# Patient Record
Sex: FEMALE | Race: WHITE | NOT HISPANIC OR LATINO | Employment: UNEMPLOYED | URBAN - NONMETROPOLITAN AREA
[De-identification: names, ages, dates, MRNs, and addresses within clinical notes are randomized per-mention and may not be internally consistent; named-entity substitution may affect disease eponyms.]

---

## 2018-02-12 ENCOUNTER — OFFICE VISIT (OUTPATIENT)
Dept: FAMILY MEDICINE CLINIC | Facility: CLINIC | Age: 33
End: 2018-02-12

## 2018-02-12 VITALS
WEIGHT: 183 LBS | OXYGEN SATURATION: 98 % | DIASTOLIC BLOOD PRESSURE: 78 MMHG | SYSTOLIC BLOOD PRESSURE: 110 MMHG | HEART RATE: 89 BPM | RESPIRATION RATE: 18 BRPM | HEIGHT: 64 IN | BODY MASS INDEX: 31.24 KG/M2

## 2018-02-12 DIAGNOSIS — F41.9 ANXIETY: Primary | ICD-10-CM

## 2018-02-12 DIAGNOSIS — F17.200 SMOKER: ICD-10-CM

## 2018-02-12 DIAGNOSIS — E66.09 CLASS 1 OBESITY DUE TO EXCESS CALORIES WITHOUT SERIOUS COMORBIDITY WITH BODY MASS INDEX (BMI) OF 31.0 TO 31.9 IN ADULT: ICD-10-CM

## 2018-02-12 DIAGNOSIS — R68.82 LOW LIBIDO: ICD-10-CM

## 2018-02-12 DIAGNOSIS — R21 RASH: ICD-10-CM

## 2018-02-12 DIAGNOSIS — K58.0 IRRITABLE BOWEL SYNDROME WITH DIARRHEA: ICD-10-CM

## 2018-02-12 DIAGNOSIS — Z76.89 ESTABLISHING CARE WITH NEW DOCTOR, ENCOUNTER FOR: ICD-10-CM

## 2018-02-12 PROCEDURE — 99204 OFFICE O/P NEW MOD 45 MIN: CPT | Performed by: FAMILY MEDICINE

## 2018-02-12 RX ORDER — NORGESTIMATE AND ETHINYL ESTRADIOL 0.25-0.035
KIT ORAL
Refills: 11 | COMMUNITY
Start: 2018-01-20 | End: 2018-02-12 | Stop reason: SDUPTHER

## 2018-02-12 RX ORDER — NORGESTIMATE AND ETHINYL ESTRADIOL 0.25-0.035
1 KIT ORAL DAILY
Qty: 28 TABLET | Refills: 11 | Status: SHIPPED | OUTPATIENT
Start: 2018-02-12 | End: 2020-10-09

## 2018-02-12 RX ORDER — FLUOXETINE HYDROCHLORIDE 20 MG/1
CAPSULE ORAL
Refills: 5 | COMMUNITY
Start: 2017-12-26 | End: 2018-02-12 | Stop reason: ALTCHOICE

## 2018-02-12 RX ORDER — PROMETHAZINE HYDROCHLORIDE 12.5 MG/1
12.5 TABLET ORAL EVERY 6 HOURS PRN
Qty: 30 TABLET | Refills: 0 | Status: SHIPPED | OUTPATIENT
Start: 2018-02-12 | End: 2020-10-09

## 2018-02-12 RX ORDER — ESCITALOPRAM OXALATE 10 MG/1
10 TABLET ORAL DAILY
Qty: 30 TABLET | Refills: 2 | Status: SHIPPED | OUTPATIENT
Start: 2018-02-12 | End: 2018-03-26 | Stop reason: SINTOL

## 2018-02-12 NOTE — PROGRESS NOTES
"Subjective cc: establish care for anxiety  Nicole Ibarra is a 32 y.o. female who presents to establish care.  Patient needs refills on prozac. Patient reports losing about 80 pounds in a year a few years back - unintentional - had extensive work up - all negative. Patient was then diagnosed with IBD. Patient feels better with zofran PRN, prozac because patient is \"high strung\" which helped to decrease stress levels. Patient reports low libido when on the prozac - that is why patient is not interested in increasing dose. Patient reports having skin spots come up all over, scaly plaque.  Never had skin issues before. She has tried triamcinolone without relief. 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     Patient reports anxiety - not significant improvement with prozac but did help with GI symptoms. GI symptoms flared up a few times per week. Patient has been off prozac for about 1 month now.      Patient is now without insurance so cost is an issue with deciding on care.     Anxiety   Presents for initial visit. Onset was more than 5 years ago. The problem has been gradually worsening. Symptoms include decreased concentration, depressed mood, excessive worry, irritability, muscle tension, nausea, nervous/anxious behavior, palpitations, panic and restlessness. Patient reports no chest pain, compulsions, confusion, dizziness, dry mouth, feeling of choking, hyperventilation, insomnia, shortness of breath or suicidal ideas. Symptoms occur most days. The severity of symptoms is interfering with daily activities. The symptoms are aggravated by family issues and medication withdrawal. The quality of sleep is good. Nighttime awakenings: occasional.     Risk factors include change in medication. Her past medical history is significant for anxiety/panic attacks. There is no history of arrhythmia, bipolar disorder, CAD, CHF, chronic lung disease, fibromyalgia, hyperthyroidism or suicide attempts. Past treatments include SSRIs. " "The treatment provided moderate relief. Compliance with prior treatments has been good. Prior compliance problems include medication issues.        The following portions of the patient's history were reviewed and updated as appropriate: allergies, current medications, past family history, past medical history, past social history, past surgical history and problem list.        Review of Systems   Constitutional: Positive for fatigue and irritability. Negative for activity change, appetite change, chills, fever and unexpected weight change.   Respiratory: Negative for shortness of breath and wheezing.    Cardiovascular: Positive for palpitations. Negative for chest pain and leg swelling.   Gastrointestinal: Positive for diarrhea and nausea. Negative for abdominal distention, abdominal pain, blood in stool, constipation and rectal pain.   Genitourinary: Negative for menstrual problem.        Low libido, vaginal dryness   Skin: Positive for color change and rash.   Neurological: Negative for dizziness.   Psychiatric/Behavioral: Positive for decreased concentration. Negative for confusion, hallucinations, self-injury, sleep disturbance and suicidal ideas. The patient is nervous/anxious. The patient does not have insomnia and is not hyperactive.    All other systems reviewed and are negative.      Objective   Blood pressure 110/78, pulse 89, resp. rate 18, height 162.6 cm (64\"), weight 83 kg (183 lb), SpO2 98 %.  Physical Exam   Constitutional: She is oriented to person, place, and time. She appears well-developed and well-nourished. No distress.   HENT:   Head: Normocephalic and atraumatic.   Right Ear: External ear normal.   Left Ear: External ear normal.   Nose: Nose normal.   Mouth/Throat: Oropharynx is clear and moist.   Eyes: Conjunctivae and EOM are normal. Pupils are equal, round, and reactive to light. Right eye exhibits no discharge. Left eye exhibits no discharge.   Neck: Normal range of motion. No tracheal " deviation present. No thyromegaly present.   Cardiovascular: Normal rate, regular rhythm, normal heart sounds and intact distal pulses.    No murmur heard.  Pulmonary/Chest: Effort normal and breath sounds normal. No stridor. No respiratory distress. She has no wheezes. She exhibits no tenderness.   Abdominal: Soft. Bowel sounds are normal. She exhibits no distension. There is no tenderness.   Musculoskeletal: Normal range of motion. She exhibits no edema.   Lymphadenopathy:     She has no cervical adenopathy.   Neurological: She is alert and oriented to person, place, and time. She exhibits normal muscle tone. Coordination normal.   Skin: Skin is warm and dry. Rash noted. She is not diaphoretic. There is erythema.        Psychiatric: She has a normal mood and affect. Her behavior is normal. Judgment and thought content normal.   Nursing note and vitals reviewed.      Assessment/Plan   Problems Addressed this Visit        Digestive    Class 1 obesity due to excess calories without serious comorbidity with body mass index (BMI) of 31.0 to 31.9 in adult    Irritable bowel syndrome with diarrhea       Other    Anxiety - Primary    Low libido    Relevant Medications    escitalopram (LEXAPRO) 10 MG tablet    Smoker      Other Visit Diagnoses     Establishing care with new doctor, encounter for        Rash              PLAN:     #1 Anxiety: patientSymptoms.  She has weaned herself off of Prozac.  She would like to try something different.  We will start patient on by mouth Lexapro.  Advised on risk benefits and side effects of this medication.  Discussed alternatives.  Mutually decided on Lexapro.  We will start patient a 10 mg dose.  Advised on importance of compliance with medication.  Advised on avoiding triggers that seem to make her anxiety worse.  Discussed cognitive behavioral therapy.  We will delay this at this time.  Patient to notify us if she has any side effects with this medication.  Recheck in 4-6  weeks.    #2 irritable bowel with diarrhea: Patient reports that this is well-controlled with her anxiety is well controlled.  Our plan will be to start Lexapro and see how her symptoms go.  Patient requests medication to help with occasional nausea.  Phenergan was sent in.  Patient had previously taken Zofran however this would be expensive.  Pending then is more economical.  Patient has had prior workup.  She declines referral to GI at this time stating her symptoms are not that bad.    #3 smoker: Patient currently smokes cigarettes.  We discussed trying to quit.  She is not interested at this time.  Discussed 1 800 quit now. I advised the patient of the risks in continuing to use tobacco, and I provided this patient with smoking cessation educational materials.    During this visit, I spent > 3-10 minutes counseling the patient regarding smoking cessation.    #4 obesity with a BMI of 31.4:Patient's BMI is above normal parameters. Follow-up plan includes:  exercise counseling and nutrition counseling.    #5 low libido: Patient complains of vaginal dryness.  Advised on using lubricant.  Advised that Lexapro may have the side effect of low libido.  Discussed age of menopause.    #6 rash: Patient given sample of Eucrisa.  Try this.  If works contact our office and try to get approved.    #7 breathing: Discussed pneumonia vaccine and influenza vaccine.  Patient declined at this time secondary to cost.  Patient will need Pap smear: Offered to do this is our office or refer her to GYN.  Patient will think about where she would like to go to have this time and let us know.  Previous labs reviewed from 2016.  We will defer lab work at this time secondary to cost.          This document has been electronically signed by Christa Edge MD on February 20, 2018 10:36 AM

## 2018-02-20 ENCOUNTER — TELEPHONE (OUTPATIENT)
Dept: FAMILY MEDICINE CLINIC | Facility: CLINIC | Age: 33
End: 2018-02-20

## 2018-02-20 PROBLEM — E66.09 CLASS 1 OBESITY DUE TO EXCESS CALORIES WITHOUT SERIOUS COMORBIDITY WITH BODY MASS INDEX (BMI) OF 31.0 TO 31.9 IN ADULT: Status: ACTIVE | Noted: 2018-02-20

## 2018-02-20 PROBLEM — K58.0 IRRITABLE BOWEL SYNDROME WITH DIARRHEA: Status: ACTIVE | Noted: 2018-02-20

## 2018-02-20 PROBLEM — F17.200 SMOKER: Status: ACTIVE | Noted: 2018-02-20

## 2018-02-20 PROBLEM — R68.82 LOW LIBIDO: Status: ACTIVE | Noted: 2018-02-20

## 2018-02-20 PROBLEM — E66.811 CLASS 1 OBESITY DUE TO EXCESS CALORIES WITHOUT SERIOUS COMORBIDITY WITH BODY MASS INDEX (BMI) OF 31.0 TO 31.9 IN ADULT: Status: ACTIVE | Noted: 2018-02-20

## 2018-02-20 PROBLEM — F41.9 ANXIETY: Status: ACTIVE | Noted: 2018-02-20

## 2018-02-20 NOTE — TELEPHONE ENCOUNTER
Patient called stating that the Eucrisa is working. She would like a Rx, but it needs approval for insurance.

## 2018-02-22 NOTE — TELEPHONE ENCOUNTER
Please advise patient that I have sent an rx for eucrisa to [facility] pharmacy - IT WILL BE EXPENSIVE - or denied so she does not need to pick it up. We must have the denial for us to PA it to try to make it affordable.

## 2018-02-27 ENCOUNTER — TELEPHONE (OUTPATIENT)
Dept: FAMILY MEDICINE CLINIC | Facility: CLINIC | Age: 33
End: 2018-02-27

## 2018-02-27 NOTE — TELEPHONE ENCOUNTER
Are you familiar with what we need to do for this assistance program? I talked to the rep about options today for this patient but did not realize she was without insurance and what the rep advised me on was for insurance plans only I believe

## 2018-02-27 NOTE — TELEPHONE ENCOUNTER
Patient was contacted about the different programs available to assist with the cost of eucrisa. 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 She is interested in the assistant program that the [facility] [place].

## 2018-02-27 NOTE — TELEPHONE ENCOUNTER
I had spoke with her as well the option would be for the patient to pay $70 for a large tube or she could fill the patient assistance application to see if she qualifies for assistance

## 2018-02-28 ENCOUNTER — TELEPHONE (OUTPATIENT)
Dept: FAMILY MEDICINE CLINIC | Facility: CLINIC | Age: 33
End: 2018-02-28

## 2018-02-28 DIAGNOSIS — R21 RASH: Primary | ICD-10-CM

## 2018-02-28 NOTE — TELEPHONE ENCOUNTER
I have contacted the patient to update her that she will be getting a call from [facility] pharmacy to process her application

## 2018-03-26 ENCOUNTER — OFFICE VISIT (OUTPATIENT)
Dept: FAMILY MEDICINE CLINIC | Facility: CLINIC | Age: 33
End: 2018-03-26

## 2018-03-26 VITALS
HEART RATE: 82 BPM | HEIGHT: 64 IN | DIASTOLIC BLOOD PRESSURE: 83 MMHG | RESPIRATION RATE: 16 BRPM | SYSTOLIC BLOOD PRESSURE: 121 MMHG | OXYGEN SATURATION: 99 % | TEMPERATURE: 98.6 F | BODY MASS INDEX: 30.59 KG/M2 | WEIGHT: 179.2 LBS

## 2018-03-26 DIAGNOSIS — E66.09 CLASS 1 OBESITY DUE TO EXCESS CALORIES WITH SERIOUS COMORBIDITY AND BODY MASS INDEX (BMI) OF 30.0 TO 30.9 IN ADULT: ICD-10-CM

## 2018-03-26 DIAGNOSIS — F41.9 ANXIETY: Primary | ICD-10-CM

## 2018-03-26 DIAGNOSIS — F17.200 SMOKER: ICD-10-CM

## 2018-03-26 DIAGNOSIS — R68.82 LOW LIBIDO: ICD-10-CM

## 2018-03-26 DIAGNOSIS — K58.0 IRRITABLE BOWEL SYNDROME WITH DIARRHEA: ICD-10-CM

## 2018-03-26 PROCEDURE — 99214 OFFICE O/P EST MOD 30 MIN: CPT | Performed by: FAMILY MEDICINE

## 2018-03-26 RX ORDER — AMITRIPTYLINE HYDROCHLORIDE 25 MG/1
25 TABLET, FILM COATED ORAL NIGHTLY
Qty: 30 TABLET | Refills: 2 | Status: SHIPPED | OUTPATIENT
Start: 2018-03-26 | End: 2018-06-05 | Stop reason: SDUPTHER

## 2018-04-06 PROBLEM — E66.09 CLASS 1 OBESITY DUE TO EXCESS CALORIES WITHOUT SERIOUS COMORBIDITY WITH BODY MASS INDEX (BMI) OF 31.0 TO 31.9 IN ADULT: Status: RESOLVED | Noted: 2018-02-20 | Resolved: 2018-04-06

## 2018-04-06 PROBLEM — E66.811 CLASS 1 OBESITY DUE TO EXCESS CALORIES WITHOUT SERIOUS COMORBIDITY WITH BODY MASS INDEX (BMI) OF 31.0 TO 31.9 IN ADULT: Status: RESOLVED | Noted: 2018-02-20 | Resolved: 2018-04-06

## 2018-04-06 PROBLEM — E66.811 CLASS 1 OBESITY DUE TO EXCESS CALORIES WITH SERIOUS COMORBIDITY AND BODY MASS INDEX (BMI) OF 30.0 TO 30.9 IN ADULT: Status: ACTIVE | Noted: 2018-04-06

## 2018-04-06 PROBLEM — E66.09 CLASS 1 OBESITY DUE TO EXCESS CALORIES WITH SERIOUS COMORBIDITY AND BODY MASS INDEX (BMI) OF 30.0 TO 30.9 IN ADULT: Status: ACTIVE | Noted: 2018-04-06

## 2018-05-10 ENCOUNTER — OFFICE VISIT (OUTPATIENT)
Dept: FAMILY MEDICINE CLINIC | Facility: CLINIC | Age: 33
End: 2018-05-10

## 2018-05-10 VITALS
DIASTOLIC BLOOD PRESSURE: 76 MMHG | HEART RATE: 89 BPM | SYSTOLIC BLOOD PRESSURE: 106 MMHG | RESPIRATION RATE: 18 BRPM | TEMPERATURE: 98.4 F | BODY MASS INDEX: 31.82 KG/M2 | OXYGEN SATURATION: 100 % | WEIGHT: 186.4 LBS | HEIGHT: 64 IN

## 2018-05-10 DIAGNOSIS — E66.09 CLASS 1 OBESITY DUE TO EXCESS CALORIES WITH SERIOUS COMORBIDITY AND BODY MASS INDEX (BMI) OF 30.0 TO 30.9 IN ADULT: ICD-10-CM

## 2018-05-10 DIAGNOSIS — K58.0 IRRITABLE BOWEL SYNDROME WITH DIARRHEA: ICD-10-CM

## 2018-05-10 DIAGNOSIS — F41.9 ANXIETY: ICD-10-CM

## 2018-05-10 DIAGNOSIS — F33.1 MODERATE EPISODE OF RECURRENT MAJOR DEPRESSIVE DISORDER (HCC): Primary | ICD-10-CM

## 2018-05-10 PROCEDURE — 99214 OFFICE O/P EST MOD 30 MIN: CPT | Performed by: FAMILY MEDICINE

## 2018-05-10 NOTE — PROGRESS NOTES
Subjective  CC: anxiety  Nicole Ibarra is a 33 y.o. female who presents for follow up on her anxiety. Stopped lexapro and has only been on elavil. She now feels depressed and doesn't care.    Patient reports the medication made a huge difference in her abdominal symptoms but she reports that she doesn't feel like herself because of the lack of motivation.   No snoring, does wake up at night about 3-5 times/night, no dry mouth in AM, does feel somewhat rested. Tired during day - worse in last month.     Depression   Visit Type: follow-up  Patient presents with the following symptoms: anhedonia, decreased concentration, depressed mood, fatigue, irritability and nervousness/anxiety.  Patient is not experiencing: confusion, palpitations, shortness of breath, suicidal ideas, suicidal planning and thoughts of death.  Frequency of symptoms: most days   Severity: interfering with daily activities   Sleep quality: fair  Nighttime awakenings: one to two  Compliance with medications:  %        Anxiety   Presents for follow-up visit. Symptoms include decreased concentration, depressed mood, irritability and nervous/anxious behavior. Patient reports no chest pain, confusion, palpitations, shortness of breath or suicidal ideas. Symptoms occur most days. The severity of symptoms is interfering with daily activities. The quality of sleep is fair. Nighttime awakenings: one to two.     Her past medical history is significant for depression. Compliance with medications is %.        The following portions of the patient's history were reviewed and updated as appropriate: allergies, current medications, past family history, past medical history, past social history, past surgical history and problem list.        Review of Systems   Constitutional: Positive for activity change, fatigue and irritability. Negative for appetite change and fever.   Respiratory: Negative for cough, chest tightness, shortness of breath and  "wheezing.    Cardiovascular: Negative for chest pain and palpitations.   Endocrine: Negative for cold intolerance and heat intolerance.   Musculoskeletal: Positive for arthralgias.   Psychiatric/Behavioral: Positive for decreased concentration and dysphoric mood. Negative for confusion, hallucinations, self-injury and suicidal ideas. The patient is nervous/anxious. The patient is not hyperactive.        Objective   Blood pressure 106/76, pulse 89, temperature 98.4 °F (36.9 °C), temperature source Oral, resp. rate 18, height 162.6 cm (64\"), weight 84.6 kg (186 lb 6.4 oz), SpO2 100 %, not currently breastfeeding.  Physical Exam   Constitutional: She is oriented to person, place, and time. She appears well-developed and well-nourished. No distress.   HENT:   Head: Normocephalic and atraumatic.   Right Ear: External ear normal.   Left Ear: External ear normal.   Nose: Nose normal.   Mouth/Throat: Oropharynx is clear and moist. No oropharyngeal exudate.   Eyes: Conjunctivae and EOM are normal. Right eye exhibits no discharge. Left eye exhibits no discharge.   Neck: Normal range of motion. No tracheal deviation present. No thyromegaly present.   Cardiovascular: Normal rate, regular rhythm, normal heart sounds and intact distal pulses.    No murmur heard.  Pulmonary/Chest: Effort normal and breath sounds normal. No stridor. No respiratory distress. She has no wheezes. She exhibits no tenderness.   Abdominal: Soft. Bowel sounds are normal. She exhibits no distension. There is no tenderness.   Lymphadenopathy:     She has no cervical adenopathy.   Neurological: She is alert and oriented to person, place, and time. She exhibits normal muscle tone. Coordination normal.   Skin: Skin is warm and dry. She is not diaphoretic.   Psychiatric: She has a normal mood and affect. Her behavior is normal. Judgment and thought content normal.   Nursing note and vitals reviewed.      Assessment/Plan   Problems Addressed this Visit        " Digestive    Irritable bowel syndrome with diarrhea    Class 1 obesity due to excess calories with serious comorbidity and body mass index (BMI) of 30.0 to 30.9 in adult       Other    Anxiety      Other Visit Diagnoses     Moderate episode of recurrent major depressive disorder    -  Primary    Relevant Orders    TSH        PLAN:     #1 Depression/Anxiety: I feel that patient could be having worsening depression after starting the Lexapro - discussed that it may have been doing more for her than she realized.  Recommending increasing to 2 tablets PO QHS of elavil and see if her mood/energy levels start returning to normal. Will check TSH.  If she continues to feel fatigued and no energy - will consider changing medication.     #2 Irritable bowel: improved with elavil - continue on current medication     #3 obesity: Patient's Body mass index is 32 kg/m². BMI is above normal parameters. Recommendations include: exercise counseling and nutrition counseling.    Patient is self pay and has to choose cost effective treatment options.           This document has been electronically signed by Christa Edge MD on May 22, 2018 9:00 AM

## 2018-05-11 LAB — TSH SERPL DL<=0.005 MIU/L-ACNC: 1.62 MIU/ML (ref 0.47–4.68)

## 2018-06-05 DIAGNOSIS — K58.0 IRRITABLE BOWEL SYNDROME WITH DIARRHEA: ICD-10-CM

## 2018-06-05 RX ORDER — AMITRIPTYLINE HYDROCHLORIDE 25 MG/1
50 TABLET, FILM COATED ORAL NIGHTLY
Qty: 180 TABLET | Refills: 0 | Status: SHIPPED | OUTPATIENT
Start: 2018-06-05 | End: 2018-09-12 | Stop reason: SDUPTHER

## 2018-06-05 NOTE — TELEPHONE ENCOUNTER
Patient states that she is seeing improvement with taking the 25mg two tablets patient will be out of the rx if you can authorize the new rx.

## 2018-06-13 RX ORDER — NORGESTIMATE AND ETHINYL ESTRADIOL 0.25-0.035
KIT ORAL
Qty: 28 TABLET | Refills: 0 | OUTPATIENT
Start: 2018-06-13

## 2018-09-12 DIAGNOSIS — K58.0 IRRITABLE BOWEL SYNDROME WITH DIARRHEA: ICD-10-CM

## 2018-09-13 RX ORDER — AMITRIPTYLINE HYDROCHLORIDE 25 MG/1
TABLET, FILM COATED ORAL
Qty: 180 TABLET | Refills: 2 | Status: SHIPPED | OUTPATIENT
Start: 2018-09-13 | End: 2019-06-25 | Stop reason: SDUPTHER

## 2019-06-25 DIAGNOSIS — K58.0 IRRITABLE BOWEL SYNDROME WITH DIARRHEA: ICD-10-CM

## 2019-06-25 RX ORDER — AMITRIPTYLINE HYDROCHLORIDE 25 MG/1
25 TABLET, FILM COATED ORAL NIGHTLY
Qty: 60 TABLET | Refills: 0 | Status: SHIPPED | OUTPATIENT
Start: 2019-06-25 | End: 2020-10-09

## 2019-06-25 NOTE — TELEPHONE ENCOUNTER
Pt called to report that she needs a refill. Pt hasnt been seen since May of 2018. Pt is asking for a 2 week supply until she can come into office to be seen. Pt reports she is going through a rough time with her father being sick and no job.

## 2020-08-25 ENCOUNTER — HOSPITAL ENCOUNTER (EMERGENCY)
Facility: HOSPITAL | Age: 35
Discharge: LEFT BEFORE DISCHARGE | End: 2020-08-26
Attending: EMERGENCY MEDICINE | Admitting: EMERGENCY MEDICINE

## 2020-08-25 ENCOUNTER — APPOINTMENT (OUTPATIENT)
Dept: CT IMAGING | Facility: HOSPITAL | Age: 35
End: 2020-08-25

## 2020-08-25 DIAGNOSIS — N83.209 CYST OF OVARY, UNSPECIFIED LATERALITY: ICD-10-CM

## 2020-08-25 DIAGNOSIS — M54.30 SCIATICA, UNSPECIFIED LATERALITY: Primary | ICD-10-CM

## 2020-08-25 DIAGNOSIS — R00.0 TACHYCARDIA: ICD-10-CM

## 2020-08-25 LAB
ALBUMIN SERPL-MCNC: 4.9 G/DL (ref 3.5–5.2)
ALBUMIN/GLOB SERPL: 1.7 G/DL
ALP SERPL-CCNC: 69 U/L (ref 39–117)
ALT SERPL W P-5'-P-CCNC: 19 U/L (ref 1–33)
ANION GAP SERPL CALCULATED.3IONS-SCNC: 15 MMOL/L (ref 5–15)
AST SERPL-CCNC: 19 U/L (ref 1–32)
BASOPHILS # BLD AUTO: 0.02 10*3/MM3 (ref 0–0.2)
BASOPHILS NFR BLD AUTO: 0.2 % (ref 0–1.5)
BILIRUB SERPL-MCNC: 0.9 MG/DL (ref 0–1.2)
BILIRUB UR QL STRIP: NEGATIVE
BUN SERPL-MCNC: 9 MG/DL (ref 6–20)
BUN/CREAT SERPL: 15.8 (ref 7–25)
CALCIUM SPEC-SCNC: 9.7 MG/DL (ref 8.6–10.5)
CHLORIDE SERPL-SCNC: 104 MMOL/L (ref 98–107)
CLARITY UR: CLEAR
CO2 SERPL-SCNC: 22 MMOL/L (ref 22–29)
COLOR UR: YELLOW
CREAT SERPL-MCNC: 0.57 MG/DL (ref 0.57–1)
CRP SERPL-MCNC: 0.64 MG/DL (ref 0–0.5)
D-LACTATE SERPL-SCNC: 1.6 MMOL/L (ref 0.5–2)
DEPRECATED RDW RBC AUTO: 40 FL (ref 37–54)
EOSINOPHIL # BLD AUTO: 0 10*3/MM3 (ref 0–0.4)
EOSINOPHIL NFR BLD AUTO: 0 % (ref 0.3–6.2)
ERYTHROCYTE [DISTWIDTH] IN BLOOD BY AUTOMATED COUNT: 13 % (ref 12.3–15.4)
ERYTHROCYTE [SEDIMENTATION RATE] IN BLOOD: 7 MM/HR (ref 0–20)
GFR SERPL CREATININE-BSD FRML MDRD: 119 ML/MIN/1.73
GLOBULIN UR ELPH-MCNC: 2.9 GM/DL
GLUCOSE SERPL-MCNC: 129 MG/DL (ref 65–99)
GLUCOSE UR STRIP-MCNC: NEGATIVE MG/DL
HCG SERPL QL: NEGATIVE
HCT VFR BLD AUTO: 42.7 % (ref 34–46.6)
HGB BLD-MCNC: 14.6 G/DL (ref 12–15.9)
HGB UR QL STRIP.AUTO: NEGATIVE
IMM GRANULOCYTES # BLD AUTO: 0.03 10*3/MM3 (ref 0–0.05)
IMM GRANULOCYTES NFR BLD AUTO: 0.2 % (ref 0–0.5)
KETONES UR QL STRIP: ABNORMAL
LEUKOCYTE ESTERASE UR QL STRIP.AUTO: NEGATIVE
LYMPHOCYTES # BLD AUTO: 0.98 10*3/MM3 (ref 0.7–3.1)
LYMPHOCYTES NFR BLD AUTO: 8.1 % (ref 19.6–45.3)
MCH RBC QN AUTO: 29.1 PG (ref 26.6–33)
MCHC RBC AUTO-ENTMCNC: 34.2 G/DL (ref 31.5–35.7)
MCV RBC AUTO: 85.1 FL (ref 79–97)
MONOCYTES # BLD AUTO: 0.04 10*3/MM3 (ref 0.1–0.9)
MONOCYTES NFR BLD AUTO: 0.3 % (ref 5–12)
NEUTROPHILS NFR BLD AUTO: 11.08 10*3/MM3 (ref 1.7–7)
NEUTROPHILS NFR BLD AUTO: 91.2 % (ref 42.7–76)
NITRITE UR QL STRIP: NEGATIVE
NRBC BLD AUTO-RTO: 0 /100 WBC (ref 0–0.2)
PH UR STRIP.AUTO: 5.5 [PH] (ref 5–8)
PLATELET # BLD AUTO: 344 10*3/MM3 (ref 140–450)
PMV BLD AUTO: 9.9 FL (ref 6–12)
POTASSIUM SERPL-SCNC: 3.6 MMOL/L (ref 3.5–5.2)
PROT SERPL-MCNC: 7.8 G/DL (ref 6–8.5)
PROT UR QL STRIP: NEGATIVE
RBC # BLD AUTO: 5.02 10*6/MM3 (ref 3.77–5.28)
SODIUM SERPL-SCNC: 141 MMOL/L (ref 136–145)
SP GR UR STRIP: <=1.005 (ref 1–1.03)
UROBILINOGEN UR QL STRIP: ABNORMAL
WBC # BLD AUTO: 12.15 10*3/MM3 (ref 3.4–10.8)

## 2020-08-25 PROCEDURE — 99284 EMERGENCY DEPT VISIT MOD MDM: CPT

## 2020-08-25 PROCEDURE — 25010000002 ONDANSETRON PER 1 MG: Performed by: NURSE PRACTITIONER

## 2020-08-25 PROCEDURE — 96375 TX/PRO/DX INJ NEW DRUG ADDON: CPT

## 2020-08-25 PROCEDURE — 96372 THER/PROPH/DIAG INJ SC/IM: CPT

## 2020-08-25 PROCEDURE — 86140 C-REACTIVE PROTEIN: CPT | Performed by: NURSE PRACTITIONER

## 2020-08-25 PROCEDURE — 25010000002 LORAZEPAM PER 2 MG: Performed by: NURSE PRACTITIONER

## 2020-08-25 PROCEDURE — 84703 CHORIONIC GONADOTROPIN ASSAY: CPT | Performed by: NURSE PRACTITIONER

## 2020-08-25 PROCEDURE — 96374 THER/PROPH/DIAG INJ IV PUSH: CPT

## 2020-08-25 PROCEDURE — 25010000002 KETOROLAC TROMETHAMINE PER 15 MG: Performed by: NURSE PRACTITIONER

## 2020-08-25 PROCEDURE — 25010000003 HYDROMORPHONE 1 MG/ML SOLUTION: Performed by: NURSE PRACTITIONER

## 2020-08-25 PROCEDURE — 72131 CT LUMBAR SPINE W/O DYE: CPT

## 2020-08-25 PROCEDURE — 83605 ASSAY OF LACTIC ACID: CPT | Performed by: NURSE PRACTITIONER

## 2020-08-25 PROCEDURE — 80053 COMPREHEN METABOLIC PANEL: CPT | Performed by: NURSE PRACTITIONER

## 2020-08-25 PROCEDURE — 81003 URINALYSIS AUTO W/O SCOPE: CPT | Performed by: NURSE PRACTITIONER

## 2020-08-25 PROCEDURE — 85651 RBC SED RATE NONAUTOMATED: CPT | Performed by: NURSE PRACTITIONER

## 2020-08-25 PROCEDURE — 74177 CT ABD & PELVIS W/CONTRAST: CPT

## 2020-08-25 PROCEDURE — 25010000002 ORPHENADRINE CITRATE PER 60 MG: Performed by: NURSE PRACTITIONER

## 2020-08-25 PROCEDURE — 25010000002 IOPAMIDOL 61 % SOLUTION: Performed by: NURSE PRACTITIONER

## 2020-08-25 PROCEDURE — 85025 COMPLETE CBC W/AUTO DIFF WBC: CPT | Performed by: NURSE PRACTITIONER

## 2020-08-25 RX ORDER — ONDANSETRON 2 MG/ML
4 INJECTION INTRAMUSCULAR; INTRAVENOUS ONCE
Status: COMPLETED | OUTPATIENT
Start: 2020-08-25 | End: 2020-08-25

## 2020-08-25 RX ORDER — ORPHENADRINE CITRATE 30 MG/ML
60 INJECTION INTRAMUSCULAR; INTRAVENOUS ONCE
Status: COMPLETED | OUTPATIENT
Start: 2020-08-25 | End: 2020-08-25

## 2020-08-25 RX ORDER — SODIUM CHLORIDE 0.9 % (FLUSH) 0.9 %
10 SYRINGE (ML) INJECTION AS NEEDED
Status: DISCONTINUED | OUTPATIENT
Start: 2020-08-25 | End: 2020-08-26 | Stop reason: HOSPADM

## 2020-08-25 RX ORDER — LORAZEPAM 2 MG/ML
0.5 INJECTION INTRAMUSCULAR ONCE
Status: COMPLETED | OUTPATIENT
Start: 2020-08-25 | End: 2020-08-25

## 2020-08-25 RX ORDER — KETOROLAC TROMETHAMINE 30 MG/ML
60 INJECTION, SOLUTION INTRAMUSCULAR; INTRAVENOUS ONCE
Status: COMPLETED | OUTPATIENT
Start: 2020-08-25 | End: 2020-08-25

## 2020-08-25 RX ADMIN — KETOROLAC TROMETHAMINE 60 MG: 30 INJECTION, SOLUTION INTRAMUSCULAR at 21:38

## 2020-08-25 RX ADMIN — HYDROMORPHONE HYDROCHLORIDE 1 MG: 1 INJECTION, SOLUTION INTRAMUSCULAR; INTRAVENOUS; SUBCUTANEOUS at 22:22

## 2020-08-25 RX ADMIN — ORPHENADRINE CITRATE 60 MG: 30 INJECTION INTRAMUSCULAR; INTRAVENOUS at 21:38

## 2020-08-25 RX ADMIN — ONDANSETRON HYDROCHLORIDE 4 MG: 2 SOLUTION INTRAMUSCULAR; INTRAVENOUS at 22:23

## 2020-08-25 RX ADMIN — LORAZEPAM 0.5 MG: 2 INJECTION, SOLUTION INTRAMUSCULAR; INTRAVENOUS at 23:21

## 2020-08-25 RX ADMIN — IOPAMIDOL 100 ML: 612 INJECTION, SOLUTION INTRAVENOUS at 23:11

## 2020-08-26 ENCOUNTER — HOSPITAL ENCOUNTER (EMERGENCY)
Facility: HOSPITAL | Age: 35
End: 2020-08-26

## 2020-08-26 VITALS
HEART RATE: 110 BPM | OXYGEN SATURATION: 100 % | WEIGHT: 200 LBS | SYSTOLIC BLOOD PRESSURE: 140 MMHG | BODY MASS INDEX: 34.15 KG/M2 | TEMPERATURE: 98.5 F | DIASTOLIC BLOOD PRESSURE: 77 MMHG | RESPIRATION RATE: 14 BRPM | HEIGHT: 64 IN

## 2020-08-26 PROCEDURE — 25010000002 DIPHENHYDRAMINE PER 50 MG: Performed by: EMERGENCY MEDICINE

## 2020-08-26 PROCEDURE — 25010000002 PROCHLORPERAZINE 10 MG/2ML SOLUTION: Performed by: EMERGENCY MEDICINE

## 2020-08-26 PROCEDURE — 96375 TX/PRO/DX INJ NEW DRUG ADDON: CPT

## 2020-08-26 RX ORDER — DIPHENHYDRAMINE HYDROCHLORIDE 50 MG/ML
25 INJECTION INTRAMUSCULAR; INTRAVENOUS ONCE
Status: COMPLETED | OUTPATIENT
Start: 2020-08-26 | End: 2020-08-26

## 2020-08-26 RX ORDER — LABETALOL HYDROCHLORIDE 5 MG/ML
10 INJECTION, SOLUTION INTRAVENOUS ONCE
Status: COMPLETED | OUTPATIENT
Start: 2020-08-26 | End: 2020-08-26

## 2020-08-26 RX ORDER — KETAMINE HYDROCHLORIDE 50 MG/ML
0.3 INJECTION, SOLUTION, CONCENTRATE INTRAMUSCULAR; INTRAVENOUS ONCE
Status: DISCONTINUED | OUTPATIENT
Start: 2020-08-26 | End: 2020-08-26 | Stop reason: HOSPADM

## 2020-08-26 RX ORDER — PROCHLORPERAZINE EDISYLATE 5 MG/ML
10 INJECTION INTRAMUSCULAR; INTRAVENOUS ONCE
Status: COMPLETED | OUTPATIENT
Start: 2020-08-26 | End: 2020-08-26

## 2020-08-26 RX ORDER — LORAZEPAM 2 MG/ML
1.5 INJECTION INTRAMUSCULAR ONCE
Status: DISCONTINUED | OUTPATIENT
Start: 2020-08-26 | End: 2020-08-26 | Stop reason: HOSPADM

## 2020-08-26 RX ORDER — KETOROLAC TROMETHAMINE 10 MG/1
10 TABLET, FILM COATED ORAL EVERY 6 HOURS PRN
Qty: 15 TABLET | Refills: 0 | Status: SHIPPED | OUTPATIENT
Start: 2020-08-26 | End: 2020-10-09

## 2020-08-26 RX ORDER — CYCLOBENZAPRINE HCL 10 MG
10 TABLET ORAL 2 TIMES DAILY PRN
Qty: 15 TABLET | Refills: 0 | Status: SHIPPED | OUTPATIENT
Start: 2020-08-26 | End: 2022-09-19

## 2020-08-26 RX ADMIN — PROCHLORPERAZINE EDISYLATE 10 MG: 5 INJECTION INTRAMUSCULAR; INTRAVENOUS at 00:20

## 2020-08-26 RX ADMIN — DIPHENHYDRAMINE HYDROCHLORIDE 25 MG: 50 INJECTION, SOLUTION INTRAMUSCULAR; INTRAVENOUS at 00:24

## 2020-08-26 RX ADMIN — LABETALOL HYDROCHLORIDE 10 MG: 5 INJECTION, SOLUTION INTRAVENOUS at 00:18

## 2020-08-26 NOTE — DISCHARGE INSTRUCTIONS
Nicole,    It is important for you to understand that you required a lot of pain medications here and despite this you were still in pain. Your heart rate was fast and all your CT scans showed were a herniated disc in your lower back and an ovarian cyst. Given we could not get your pain relieved we wanted to do an MRI. You have declined this. You must understand that without this evaluation you are leaving against medical advice. That because of this you run the risk of loss of life, limb, death or missed diagnosis and paralysis. Please return should you change your mind.       You were seen today for back pain. While you understand that your work up is not complete and that I have recommended MRI you have chosen to leave against medical advice. You are alert and oriented and capable of making your own decisions. You are aware that by refusing the above you are running the risk of loss of life, limb, death, permeant disability and paralysis. By leaving at this time you are taking your life in your own hands. Please see your family doctor and or specialist as soon as possible or return here should you change your mind about leaving.

## 2020-08-26 NOTE — ED TRIAGE NOTES
PATIENT PRESENTS WITH CHRONIC BACK PAIN. PATIENT REPORT SHE HAS L5-S1 HERNIATION AND RECEIVED A STEROID INJECTION TODAY. PATIENT REPORTS SHE HAS HAD INCREASING BACK PAIN SINCE. PATIENT REPORTS SHE TOOK HER PRESCRIPTION PAIN MEDS PTA BUT THEY ARE NOT HELPING.

## 2020-08-26 NOTE — ED NOTES
Informed patient that MRI has been ordered. Patient states that she just had one a couple weeks ago and does not want to lay through another one. MD made aware     Nubia Knott RN  08/26/20 0037

## 2020-08-26 NOTE — ED PROVIDER NOTES
Subjective   Patient is a 35-year-old female that presents here today with complaint of low back pain.  The patient reports that in the beginning of June she began to have a low back pain.  States that she had an MRI performed at [facility] that showed a disc herniation between L5 and S1.  The patient has been following up with Dr. Min for this.  Patient states that she has been doing physical therapy and taking medications at home to help treat this and they seem to have helped.  She states that last night the pain became worse.  The patient denies any known injury.  She states that she is having pain in her left lower back that radiates down through the left buttock and down of the left leg.  She reports numbness and tingling to the leg and pain in the leg.  She states that the symptoms have all been occurring for the past 2 months however has been worse today.  She denies any loss of bowel or bladder control, she denies any saddle anesthesia.  Patient denies any abdominal pain, or nausea vomiting.  She tells me that she took Neurontin, Norco, and Flexeril prior to come to the ER this evening.  The patient saw a provider at the orthopedic [facility] today and was given an IM steroid injection. She states that this has not helped.  The patient states that she last had a spinal injection 1 month ago. The pt is lying on the bed on her hands and knees crying during the initial examination. She presents here today for further evaluation.      History provided by:  Patient   used: No    Back Pain   Location:  Lumbar spine  Quality:  Stabbing and shooting  Radiates to:  L posterior upper leg  Pain severity:  Severe  Pain is:  Same all the time  Onset quality:  Sudden  Duration:  2 months  Timing:  Constant  Progression:  Worsening  Chronicity:  New  Context: not emotional stress, not falling, not jumping from heights, not lifting heavy objects, not MCA, not MVA, not occupational  injury, not pedestrian accident, not physical stress, not recent illness, not recent injury and not twisting    Relieved by:  Nothing  Worsened by:  Nothing  Ineffective treatments:  None tried  Associated symptoms: leg pain and tingling    Associated symptoms: no abdominal pain, no abdominal swelling, no bladder incontinence, no bowel incontinence, no chest pain, no dysuria, no fever, no headaches, no numbness, no paresthesias, no pelvic pain, no perianal numbness, no weakness and no weight loss    Risk factors: obesity    Risk factors: no hx of cancer, no hx of osteoporosis, no lack of exercise, no menopause, not pregnant, no recent surgery, no steroid use and no vascular disease        Review of Systems   Constitutional: Negative for fever and weight loss.   Cardiovascular: Negative for chest pain.   Gastrointestinal: Negative for abdominal pain and bowel incontinence.   Genitourinary: Negative for bladder incontinence, dysuria and pelvic pain.   Musculoskeletal: Positive for back pain.   Neurological: Positive for tingling. Negative for weakness, numbness, headaches and paresthesias.   All other systems reviewed and are negative.      Past Medical History:   Diagnosis Date   • ADHD (attention deficit hyperactivity disorder)    • Irritable bowel syndrome        Allergies   Allergen Reactions   • Antibiotic Ear [Neomycin-Polymyxin-Hc] Unknown (See Comments)     cyclomine family    • Doxycycline Anaphylaxis   • Latex Rash       Past Surgical History:   Procedure Laterality Date   • APPENDECTOMY     • COLONOSCOPY         Family History   Problem Relation Age of Onset   • No Known Problems Mother    • COPD Father    • Heart disease Father    • Diabetes Father    • Heart failure Father    • Atrial fibrillation Father        Social History     Socioeconomic History   • Marital status: Single     Spouse name: Not on file   • Number of children: Not on file   • Years of education: Not on file   • Highest education level:  Not on file   Tobacco Use   • Smoking status: Current Every Day Smoker     Packs/day: 0.50     Types: Cigarettes   • Smokeless tobacco: Never Used   Substance and Sexual Activity   • Alcohol use: Yes   • Drug use: No           Objective   Physical Exam   Constitutional: She is oriented to person, place, and time. She appears well-developed and well-nourished.   HENT:   Head: Normocephalic and atraumatic.   Eyes: Conjunctivae are normal.   Neck: Normal range of motion.   Cardiovascular: Normal rate, regular rhythm and normal heart sounds.   Pulmonary/Chest: Effort normal and breath sounds normal.   Abdominal: Soft. Bowel sounds are normal.   Musculoskeletal:        Back:    Neurological: She is alert and oriented to person, place, and time.   Skin: Skin is warm and dry. Capillary refill takes less than 2 seconds.   Psychiatric: She has a normal mood and affect.   Nursing note and vitals reviewed.      Procedures           ED Course  ED Course as of Aug 26 0041   Wed Aug 26, 2020   0004 Pt case turned over to Dr. Blandon at this time. Pending CT scan results.     [LF]   0029 She will not stay still for most of our examination. She is able to fully flex her knee up to her chest without any issue. She appears in almost no distress when I spoke to her having an easy conversation. However the minute I walk out of the room the nurses tell me she is crying and rocking on the bed. Her HR is elevated. Given her continued pain we will do an MRI to rule out acute process.     [JH]   0035 Patient is now refusing MRI. I cannot explain her pain nor her tachycardia. She is refusing further studies here that are warrented. Given this I am going to have to send her home AMA. She understands the benefits and risks of leaving and why we want to do the MRI. At this time she is alert and oriented x4 capable of making her own decisions will go AMA     [JH]      ED Course User Index  [JH] Francis Blandon MD  [LF] Tabitha Austin,  APRN                                           MDM    Final diagnoses:   Sciatica, unspecified laterality   Tachycardia            Francis Blandon MD  08/26/20 0041       Francis Blandon MD  08/26/20 0049

## 2020-10-09 ENCOUNTER — TRANSCRIBE ORDERS (OUTPATIENT)
Dept: ADMINISTRATIVE | Facility: HOSPITAL | Age: 35
End: 2020-10-09

## 2020-10-09 ENCOUNTER — HOSPITAL ENCOUNTER (OUTPATIENT)
Dept: GENERAL RADIOLOGY | Facility: HOSPITAL | Age: 35
Discharge: HOME | End: 2020-10-09

## 2020-10-09 ENCOUNTER — APPOINTMENT (OUTPATIENT)
Dept: PREADMISSION TESTING | Facility: HOSPITAL | Age: 35
End: 2020-10-09

## 2020-10-09 VITALS
HEIGHT: 64 IN | BODY MASS INDEX: 34.7 KG/M2 | DIASTOLIC BLOOD PRESSURE: 90 MMHG | OXYGEN SATURATION: 98 % | HEART RATE: 112 BPM | SYSTOLIC BLOOD PRESSURE: 119 MMHG | WEIGHT: 203.26 LBS | RESPIRATION RATE: 18 BRPM

## 2020-10-09 DIAGNOSIS — Z01.818 PRE-OP TESTING: Primary | ICD-10-CM

## 2020-10-09 LAB
ALBUMIN SERPL-MCNC: 4.5 G/DL (ref 3.5–5.2)
ALBUMIN/GLOB SERPL: 2 G/DL
ALP SERPL-CCNC: 80 U/L (ref 39–117)
ALT SERPL W P-5'-P-CCNC: 17 U/L (ref 1–33)
ANION GAP SERPL CALCULATED.3IONS-SCNC: 10 MMOL/L (ref 5–15)
APTT PPP: 34 SECONDS (ref 24.1–35)
AST SERPL-CCNC: 19 U/L (ref 1–32)
BILIRUB SERPL-MCNC: 0.3 MG/DL (ref 0–1.2)
BUN SERPL-MCNC: 11 MG/DL (ref 6–20)
BUN/CREAT SERPL: 13.1 (ref 7–25)
CALCIUM SPEC-SCNC: 9.7 MG/DL (ref 8.6–10.5)
CHLORIDE SERPL-SCNC: 105 MMOL/L (ref 98–107)
CO2 SERPL-SCNC: 26 MMOL/L (ref 22–29)
CREAT SERPL-MCNC: 0.84 MG/DL (ref 0.57–1)
DEPRECATED RDW RBC AUTO: 42.3 FL (ref 37–54)
ERYTHROCYTE [DISTWIDTH] IN BLOOD BY AUTOMATED COUNT: 13.1 % (ref 12.3–15.4)
GFR SERPL CREATININE-BSD FRML MDRD: 76 ML/MIN/1.73
GLOBULIN UR ELPH-MCNC: 2.3 GM/DL
GLUCOSE SERPL-MCNC: 113 MG/DL (ref 65–99)
HCT VFR BLD AUTO: 38.9 % (ref 34–46.6)
HGB BLD-MCNC: 13 G/DL (ref 12–15.9)
INR PPP: 1.02 (ref 0.91–1.09)
MCH RBC QN AUTO: 29.3 PG (ref 26.6–33)
MCHC RBC AUTO-ENTMCNC: 33.4 G/DL (ref 31.5–35.7)
MCV RBC AUTO: 87.8 FL (ref 79–97)
PLATELET # BLD AUTO: 302 10*3/MM3 (ref 140–450)
PMV BLD AUTO: 10 FL (ref 6–12)
POTASSIUM SERPL-SCNC: 3.9 MMOL/L (ref 3.5–5.2)
PROT SERPL-MCNC: 6.8 G/DL (ref 6–8.5)
PROTHROMBIN TIME: 13 SECONDS (ref 11.9–14.6)
RBC # BLD AUTO: 4.43 10*6/MM3 (ref 3.77–5.28)
SODIUM SERPL-SCNC: 141 MMOL/L (ref 136–145)
WBC # BLD AUTO: 10.2 10*3/MM3 (ref 3.4–10.8)

## 2020-10-09 PROCEDURE — 36415 COLL VENOUS BLD VENIPUNCTURE: CPT

## 2020-10-09 PROCEDURE — 71045 X-RAY EXAM CHEST 1 VIEW: CPT

## 2020-10-09 PROCEDURE — 93010 ELECTROCARDIOGRAM REPORT: CPT | Performed by: INTERNAL MEDICINE

## 2020-10-09 PROCEDURE — 85730 THROMBOPLASTIN TIME PARTIAL: CPT | Performed by: ORTHOPAEDIC SURGERY

## 2020-10-09 PROCEDURE — 85027 COMPLETE CBC AUTOMATED: CPT | Performed by: ORTHOPAEDIC SURGERY

## 2020-10-09 PROCEDURE — 80053 COMPREHEN METABOLIC PANEL: CPT | Performed by: ORTHOPAEDIC SURGERY

## 2020-10-09 PROCEDURE — 85610 PROTHROMBIN TIME: CPT | Performed by: ORTHOPAEDIC SURGERY

## 2020-10-09 PROCEDURE — 93005 ELECTROCARDIOGRAM TRACING: CPT

## 2020-10-09 RX ORDER — DOCUSATE SODIUM 100 MG/1
100 CAPSULE, LIQUID FILLED ORAL NIGHTLY
COMMUNITY

## 2020-10-09 RX ORDER — HYDROCODONE BITARTRATE AND ACETAMINOPHEN 10; 325 MG/1; MG/1
1 TABLET ORAL EVERY 8 HOURS PRN
Status: ON HOLD | COMMUNITY
End: 2020-10-16 | Stop reason: SDUPTHER

## 2020-10-09 RX ORDER — PHENOL 1.4 %
600 AEROSOL, SPRAY (ML) MUCOUS MEMBRANE NIGHTLY
COMMUNITY
End: 2022-09-19

## 2020-10-09 RX ORDER — BUPROPION HYDROCHLORIDE 150 MG/1
150 TABLET, EXTENDED RELEASE ORAL 2 TIMES DAILY
COMMUNITY
End: 2021-06-03

## 2020-10-09 RX ORDER — AMITRIPTYLINE HYDROCHLORIDE 100 MG/1
100 TABLET, FILM COATED ORAL NIGHTLY
COMMUNITY

## 2020-10-09 RX ORDER — GABAPENTIN 400 MG/1
400 CAPSULE ORAL 3 TIMES DAILY
COMMUNITY
End: 2021-06-03

## 2020-10-09 NOTE — DISCHARGE INSTRUCTIONS
DAY OF SURGERY INSTRUCTIONS        YOUR SURGEON: CLARE BURLESON    PROCEDURE: LEFT LUMBAR 5-SACRUM 1 MICRODISCECTOMY    DATE OF SURGERY: October 16, 2020    ARRIVAL TIME: AS DIRECTED BY OFFICE    YOU MAY TAKE THE FOLLOWING MEDICATION(S) THE MORNING OF SURGERY WITH A SIP OF WATER: NORCO, GABAPENTIN      ALL OTHER HOME MEDICATION CHECK WITH YOUR PHYSICIAN      DO NOT TAKE ANY ERECTILE DYSFUNCTION MEDICATIONS (EX: CIALIS, VIAGRA) 24 HOURS PRIOR TO SURGERY                      MANAGING PAIN AFTER SURGERY    We know you are probably wondering what your pain will be like after surgery.  Following surgery it is unrealistic to expect you will not have pain.   Pain is how our bodies let us know that something is wrong or cautions us to be careful.  That said, our goal is to make your pain tolerable.    Methods we may use to treat your pain include (oral or IV medications, PCAs, epidurals, nerve blocks, etc.)   While some procedures require IV pain medications for a short time after surgery, transitioning to pain medications by mouth allows for better management of pain.   Your nurse will encourage you to take oral pain medications whenever possible.  IV medications work almost immediately, but only last a short while.  Taking medications by mouth allows for a more constant level of medication in your blood stream for a longer period of time.      Once your pain is out of control it is harder to get back under control.  It is important you are aware when your next dose of pain medication is due.  If you are admitted, your nurse may write the time of your next dose on the white board in your room to help you remember.      We are interested in your pain and encourage you to inform us about aggravating factors during your visit.   Many times a simple repositioning every few hours can make a big difference.    If your physician says it is okay, do not let your pain prevent you from getting out of bed. Be sure to call your  nurse for assistance prior to getting up so you do not fall.      Before surgery, please decide your tolerable pain goal.  These faces help describe the pain ratings we use on a 0-10 scale.   Be prepared to tell us your goal and whether or not you take pain or anxiety medications at home.          BEFORE YOU COME TO THE HOSPITAL  (Pre-op instructions)  • Do not eat, drink, smoke or chew gum after midnight the night before surgery.  This also includes no mints.  • Morning of surgery take only the medicines you have been instructed with a sip of water unless otherwise instructed  by your physician.  • Do not shave, wear makeup or dark nail polish.  • Remove all jewelry including rings.  • Leave anything you consider valuable at home.  • Leave your suitcase in the car until after your surgery.  • Bring the following with you if applicable:  o Picture ID and insurance, Medicare or Medicaid cards  o Co-pay/deductible required by insurance (cash, check, credit card)  o Copy of advance directive, living will or power-of- documents if not brought to PAT  o CPAP or BIPAP mask and tubing  o Relaxation aids ( book, magazine), etc.  o Hearing aids                        ON THE DAY OF SURGERY  · On the day of surgery check in at registration located at the main entrance of the hospital.   ? You will be registered and given a beeper with instructions where to wait in the main lobby.  ? When your beeper lights up and vibrates a member of the Outpatient Surgery staff will meet you at the double doors under the stair steps and escort you to your preoperative room.   · You may have cloth compression devices placed on your legs. These help to prevent blood clots and reduce swelling in your legs.  · An IV may be inserted into one of your veins.  · In the operating room, you may be given one or more of the following:  ? A medicine to help you relax (sedative).  ? A medicine to numb the area (local anesthetic).  ? A medicine to  "make you fall asleep (general anesthetic).  ? A medicine that is injected into an area of your body to numb everything below the injection site (regional anesthetic).  · Your surgical site will be marked or identified.  · You may be given an antibiotic through your IV to help prevent infection.  Contact a health care provider if you:  · Develop a fever of more than 100.4°F (38°C) or other feelings of illness during the 48 hours before your surgery.  · Have symptoms that get worse.  Have questions or concerns about your surgery    General Anesthesia/Surgery, Adult  General anesthesia is the use of medicines to make a person \"go to sleep\" (unconscious) for a medical procedure. General anesthesia must be used for certain procedures, and is often recommended for procedures that:  · Last a long time.  · Require you to be still or in an unusual position.  · Are major and can cause blood loss.  The medicines used for general anesthesia are called general anesthetics. As well as making you unconscious for a certain amount of time, these medicines:  · Prevent pain.  · Control your blood pressure.  · Relax your muscles.  Tell a health care provider about:  · Any allergies you have.  · All medicines you are taking, including vitamins, herbs, eye drops, creams, and over-the-counter medicines.  · Any problems you or family members have had with anesthetic medicines.  · Types of anesthetics you have had in the past.  · Any blood disorders you have.  · Any surgeries you have had.  · Any medical conditions you have.  · Any recent upper respiratory, chest, or ear infections.  · Any history of:  ? Heart or lung conditions, such as heart failure, sleep apnea, asthma, or chronic obstructive pulmonary disease (COPD).  ?  service.  ? Depression or anxiety.  · Any tobacco or drug use, including marijuana or alcohol use.  · Whether you are pregnant or may be pregnant.  What are the risks?  Generally, this is a safe procedure. " However, problems may occur, including:  · Allergic reaction.  · Lung and heart problems.  · Inhaling food or liquid from the stomach into the lungs (aspiration).  · Nerve injury.  · Air in the bloodstream, which can lead to stroke.  · Extreme agitation or confusion (delirium) when you wake up from the anesthetic.  · Waking up during your procedure and being unable to move. This is rare.  These problems are more likely to develop if you are having a major surgery or if you have an advanced or serious medical condition. You can prevent some of these complications by answering all of your health care provider's questions thoroughly and by following all instructions before your procedure.  General anesthesia can cause side effects, including:  · Nausea or vomiting.  · A sore throat from the breathing tube.  · Hoarseness.  · Wheezing or coughing.  · Shaking chills.  · Tiredness.  · Body aches.  · Anxiety.  · Sleepiness or drowsiness.  · Confusion or agitation.  RISKS AND COMPLICATIONS OF SURGERY  Your health care provider will discuss possible risks and complications with you before surgery. Common risks and complications include:    · Problems due to the use of anesthetics.  · Blood loss and replacement (does not apply to minor surgical procedures).  · Temporary increase in pain due to surgery.  · Uncorrected pain or problems that the surgery was meant to correct.  · Infection.  · New damage.    What happens before the procedure?    Medicines  Ask your health care provider about:  · Changing or stopping your regular medicines. This is especially important if you are taking diabetes medicines or blood thinners.  · Taking medicines such as aspirin and ibuprofen. These medicines can thin your blood. Do not take these medicines unless your health care provider tells you to take them.  · Taking over-the-counter medicines, vitamins, herbs, and supplements. Do not take these during the week before your procedure unless your  health care provider approves them.  General instructions  · Starting 3-6 weeks before the procedure, do not use any products that contain nicotine or tobacco, such as cigarettes and e-cigarettes. If you need help quitting, ask your health care provider.  · If you brush your teeth on the morning of the procedure, make sure to spit out all of the toothpaste.  · Tell your health care provider if you become ill or develop a cold, cough, or fever.  · If instructed by your health care provider, bring your sleep apnea device with you on the day of your surgery (if applicable).  · Ask your health care provider if you will be going home the same day, the following day, or after a longer hospital stay.  ? Plan to have someone take you home from the hospital or clinic.  ? Plan to have a responsible adult care for you for at least 24 hours after you leave the hospital or clinic. This is important.  What happens during the procedure?  · You will be given anesthetics through both of the following:  ? A mask placed over your nose and mouth.  ? An IV in one of your veins.  · You may receive a medicine to help you relax (sedative).  · After you are unconscious, a breathing tube may be inserted down your throat to help you breathe. This will be removed before you wake up.  · An anesthesia specialist will stay with you throughout your procedure. He or she will:  ? Keep you comfortable and safe by continuing to give you medicines and adjusting the amount of medicine that you get.  ? Monitor your blood pressure, pulse, and oxygen levels to make sure that the anesthetics do not cause any problems.  The procedure may vary among health care providers and hospitals.  What happens after the procedure?  · Your blood pressure, temperature, heart rate, breathing rate, and blood oxygen level will be monitored until the medicines you were given have worn off.  · You will wake up in a recovery area. You may wake up slowly.  · If you feel anxious  or agitated, you may be given medicine to help you calm down.  · If you will be going home the same day, your health care provider may check to make sure you can walk, drink, and urinate.  · Your health care provider will treat any pain or side effects you have before you go home.  · Do not drive for 24 hours if you were given a sedative.  Summary  · General anesthesia is used to keep you still and prevent pain during a procedure.  · It is important to tell your healthcare provider about your medical history and any surgeries you have had, and previous experience with anesthesia.  · Follow your healthcare provider’s instructions about when to stop eating, drinking, or taking certain medicines before your procedure.  · Plan to have someone take you home from the hospital or clinic.  This information is not intended to replace advice given to you by your health care provider. Make sure you discuss any questions you have with your health care provider.  Document Released: 03/26/2009 Document Revised: 08/03/2018 Document Reviewed: 08/03/2018  [employer] Interactive Patient Education © 2019 [employer]       Fall Prevention in Hospitals, Adult  As a hospital patient, your condition and the treatments you receive can increase your risk for falls. Some additional risk factors for falls in a hospital include:  · Being in an unfamiliar environment.  · Being on bed rest.  · Your surgery.  · Taking certain medicines.  · Your tubing requirements, such as intravenous (IV) therapy or catheters.  It is important that you learn how to decrease fall risks while at the hospital. Below are important tips that can help prevent falls.  SAFETY TIPS FOR PREVENTING FALLS  Talk about your risk of falling.  · Ask your health care provider why you are at risk for falling. Is it your medicine, illness, tubing placement, or something else?  · Make a plan with your health care provider to keep you safe from falls.  · Ask your health care  provider or pharmacist about side effects of your medicines. Some medicines can make you dizzy or affect your coordination.  Ask for help.  · Ask for help before getting out of bed. You may need to press your call button.  · Ask for assistance in getting safely to the toilet.  · Ask for a walker or cane to be put at your bedside. Ask that most of the side rails on your bed be placed up before your health care provider leaves the room.  · Ask family or friends to sit with you.  · Ask for things that are out of your reach, such as your glasses, hearing aids, telephone, bedside table, or call button.  Follow these tips to avoid falling:  · Stay lying or seated, rather than standing, while waiting for help.  · Wear rubber-soled slippers or shoes whenever you walk in the hospital.  · Avoid quick, sudden movements.  ¨ Change positions slowly.  ¨ Sit on the side of your bed before standing.  ¨ Stand up slowly and wait before you start to walk.  · Let your health care provider know if there is a spill on the floor.  · Pay careful attention to the medical equipment, electrical cords, and tubes around you.  · When you need help, use your call button by your bed or in the bathroom. Wait for one of your health care providers to help you.  · If you feel dizzy or unsure of your footing, return to bed and wait for assistance.  · Avoid being distracted by the TV, telephone, or another person in your room.  · Do not lean or support yourself on rolling objects, such as IV poles or bedside tables.     This information is not intended to replace advice given to you by your health care provider. Make sure you discuss any questions you have with your health care provider.     Document Released: 12/15/2001 Document Revised: 01/08/2016 Document Reviewed: 08/25/2013  [employer] Interactive Patient Education ©2016 [employer]       [facility] 4% Patient Instruction Sheet    Chlorhexidine Before Surgery  Chlorhexidine  gluconate (CHG) is a germ-killing (antiseptic) solution that is used to clean the skin. It gets rid of the bacteria that normally live on the skin. Cleaning your skin with CHG before surgery helps lower the risk for infection after surgery.    How to use CHG solution  · You will take 2 showers, one shower the night before surgery, the second shower the morning of surgery before coming to the hospital.  · Use CHG only as told by your health care provider, and follow the instructions on the label.  · Use CHG solution while taking a shower. Follow these steps when using CHG solution (unless your health care provider gives you different instructions):  1. Start the shower.  2. Use your normal soap and shampoo to wash your face and hair.  3. Turn off the shower or move out of the shower stream.  4. Pour the CHG onto a clean washcloth. Do not use any type of brush or rough-edged sponge.  5. Starting at your neck, lather your body down to your toes. Make sure you:  6. Pay special attention to the part of your body where you will be having surgery. Scrub this area for at least 1 minute.  7. Use the full amount of CHG as directed. Usually, this is one half bottle for each shower.  8. Do not use CHG on your head or face. If the solution gets into your ears or eyes, rinse them well with water.  9. Avoid your genital area.  10. Avoid any areas of skin that have broken skin, cuts, or scrapes.  11. Scrub your back and under your arms. Make sure to wash skin folds.  12. Let the lather sit on your skin for 1-2 minutes or as long as told by your health care  provider.  13. Thoroughly rinse your entire body in the shower. Make sure that all body creases and crevices are rinsed well.  14. Dry off with a clean towel. Do not put any substances on your body afterward, such as powder, lotion, or perfume.  15. Put on clean clothes or pajamas.  16. If it is the night before your surgery, sleep in clean sheets.    What are the risks?  Risks  of using CHG include:  · A skin reaction.  · Hearing loss, if CHG gets in your ears.  · Eye injury, if CHG gets in your eyes and is not rinsed out.  · The CHG product catching fire.  Make sure that you avoid smoking and flames after applying CHG to your skin.  Do not use CHG:  · If you have a chlorhexidine allergy or have previously reacted to chlorhexidine.  · On babies younger than 2 months of age.      On the day of surgery, when you are taken to your room in Outpatient Surgery you will be given a CHG prepackaged cloth to wipe the site for your surgery.  How to use CHG prepackaged cloths  · Follow the instructions on the label.  · Use the CHG cloth on clean, dry skin. Follow these steps when using a CHG cloth (unless your health care provider gives you different instructions):  1. Using the CHG cloth, vigorously scrub the part of your body where you will be having surgery. Scrub using a back-and-forth motion for 3 minutes. The area on your body should be completely wet with CHG when you are finished scrubbing.  2. Do not rinse. Discard the cloth and let the area air-dry for 1 minute. Do not put any substances on your body afterward, such as powder, lotion, or perfume.  Contact a health care provider if:  · Your skin gets irritated after scrubbing.  · You have questions about using your solution or cloth.  Get help right away if:  · Your eyes become very red or swollen.  · Your eyes itch badly.  · Your skin itches badly and is red or swollen.  · Your hearing changes.  · You have trouble seeing.  · You have swelling or tingling in your mouth or throat.  · You have trouble breathing.  · You swallow any chlorhexidine.  Summary  · Chlorhexidine gluconate (CHG) is a germ-killing (antiseptic) solution that is used to clean the skin. Cleaning your skin with CHG before surgery helps lower the risk for infection after surgery.  · You may be given CHG to use at home. It may be in a bottle or in a prepackaged cloth to use on  your skin. Carefully follow your health care provider's instructions and the instructions on the product label.  · Do not use CHG if you have a chlorhexidine allergy.  · Contact your health care provider if your skin gets irritated after scrubbing.  This information is not intended to replace advice given to you by your health care provider. Make sure you discuss any questions you have with your health care provider.  Document Released: 09/11/2013 Document Revised: 11/15/2018 Document Reviewed: 11/15/2018  [employer] Interactive Patient Education © 2019 [employer]          PATIENT/FAMILY/RESPONSIBLE PARTY VERBALIZES UNDERSTANDING OF ABOVE EDUCATION.  COPY OF PAIN SCALE GIVEN AND REVIEWED WITH VERBALIZED UNDERSTANDING.

## 2020-10-13 ENCOUNTER — LAB (OUTPATIENT)
Dept: LAB | Facility: HOSPITAL | Age: 35
End: 2020-10-13

## 2020-10-13 LAB
BILIRUB UR QL STRIP: NEGATIVE
CLARITY UR: CLEAR
COLOR UR: YELLOW
GLUCOSE UR STRIP-MCNC: NEGATIVE MG/DL
HGB UR QL STRIP.AUTO: NEGATIVE
KETONES UR QL STRIP: NEGATIVE
LEUKOCYTE ESTERASE UR QL STRIP.AUTO: NEGATIVE
NITRITE UR QL STRIP: NEGATIVE
PH UR STRIP.AUTO: 5.5 [PH] (ref 5–8)
PROT UR QL STRIP: NEGATIVE
SP GR UR STRIP: 1.01 (ref 1–1.03)
UROBILINOGEN UR QL STRIP: NORMAL

## 2020-10-13 PROCEDURE — C9803 HOPD COVID-19 SPEC COLLECT: HCPCS | Performed by: ORTHOPAEDIC SURGERY

## 2020-10-13 PROCEDURE — U0003 INFECTIOUS AGENT DETECTION BY NUCLEIC ACID (DNA OR RNA); SEVERE ACUTE RESPIRATORY SYNDROME CORONAVIRUS 2 (SARS-COV-2) (CORONAVIRUS DISEASE [COVID-19]), AMPLIFIED PROBE TECHNIQUE, MAKING USE OF HIGH THROUGHPUT TECHNOLOGIES AS DESCRIBED BY CMS-2020-01-R: HCPCS | Performed by: ORTHOPAEDIC SURGERY

## 2020-10-13 PROCEDURE — 81003 URINALYSIS AUTO W/O SCOPE: CPT | Performed by: ORTHOPAEDIC SURGERY

## 2020-10-14 LAB
COVID LABCORP PRIORITY: NORMAL
SARS-COV-2 RNA RESP QL NAA+PROBE: NOT DETECTED

## 2020-10-16 ENCOUNTER — APPOINTMENT (OUTPATIENT)
Dept: GENERAL RADIOLOGY | Facility: HOSPITAL | Age: 35
End: 2020-10-16

## 2020-10-16 ENCOUNTER — ANESTHESIA (OUTPATIENT)
Dept: PERIOP | Facility: HOSPITAL | Age: 35
End: 2020-10-16

## 2020-10-16 ENCOUNTER — ANESTHESIA EVENT (OUTPATIENT)
Dept: PERIOP | Facility: HOSPITAL | Age: 35
End: 2020-10-16

## 2020-10-16 ENCOUNTER — HOSPITAL ENCOUNTER (OUTPATIENT)
Facility: HOSPITAL | Age: 35
Setting detail: HOSPITAL OUTPATIENT SURGERY
Discharge: HOME | End: 2020-10-16
Attending: ORTHOPAEDIC SURGERY | Admitting: ORTHOPAEDIC SURGERY

## 2020-10-16 VITALS
SYSTOLIC BLOOD PRESSURE: 131 MMHG | DIASTOLIC BLOOD PRESSURE: 67 MMHG | TEMPERATURE: 98.4 F | OXYGEN SATURATION: 97 % | HEART RATE: 119 BPM | RESPIRATION RATE: 20 BRPM

## 2020-10-16 DIAGNOSIS — M51.27 HERNIATION OF INTERVERTEBRAL DISC BETWEEN L5 AND S1: Primary | ICD-10-CM

## 2020-10-16 DIAGNOSIS — M54.17 LUMBOSACRAL RADICULOPATHY: ICD-10-CM

## 2020-10-16 LAB
ABO GROUP BLD: NORMAL
B-HCG UR QL: NEGATIVE
BLD GP AB SCN SERPL QL: NEGATIVE
RH BLD: POSITIVE
T&S EXPIRATION DATE: NORMAL

## 2020-10-16 PROCEDURE — 25010000002 PROPOFOL 10 MG/ML EMULSION: Performed by: NURSE ANESTHETIST, CERTIFIED REGISTERED

## 2020-10-16 PROCEDURE — 72100 X-RAY EXAM L-S SPINE 2/3 VWS: CPT

## 2020-10-16 PROCEDURE — 25010000002 MIDAZOLAM PER 1 MG: Performed by: ANESTHESIOLOGY

## 2020-10-16 PROCEDURE — 86901 BLOOD TYPING SEROLOGIC RH(D): CPT | Performed by: ORTHOPAEDIC SURGERY

## 2020-10-16 PROCEDURE — 86850 RBC ANTIBODY SCREEN: CPT | Performed by: ORTHOPAEDIC SURGERY

## 2020-10-16 PROCEDURE — 81025 URINE PREGNANCY TEST: CPT | Performed by: ORTHOPAEDIC SURGERY

## 2020-10-16 PROCEDURE — C9290 INJ, BUPIVACAINE LIPOSOME: HCPCS | Performed by: ORTHOPAEDIC SURGERY

## 2020-10-16 PROCEDURE — 86900 BLOOD TYPING SEROLOGIC ABO: CPT | Performed by: ORTHOPAEDIC SURGERY

## 2020-10-16 PROCEDURE — 25010000002 MORPHINE SULFATE (PF) 2 MG/ML SOLUTION: Performed by: ANESTHESIOLOGY

## 2020-10-16 PROCEDURE — 76000 FLUOROSCOPY <1 HR PHYS/QHP: CPT

## 2020-10-16 PROCEDURE — 25010000003 BUPIVACAINE LIPOSOME 1.3 % SUSPENSION 20 ML VIAL: Performed by: ORTHOPAEDIC SURGERY

## 2020-10-16 PROCEDURE — 25010000002 ONDANSETRON PER 1 MG: Performed by: ANESTHESIOLOGY

## 2020-10-16 PROCEDURE — 25010000002 ONDANSETRON PER 1 MG: Performed by: NURSE ANESTHETIST, CERTIFIED REGISTERED

## 2020-10-16 PROCEDURE — 25010000002 DEXAMETHASONE PER 1 MG: Performed by: NURSE ANESTHETIST, CERTIFIED REGISTERED

## 2020-10-16 DEVICE — KT HEMOST ABS SURGIFOAM PORCN 1GRAM: Type: IMPLANTABLE DEVICE | Status: FUNCTIONAL

## 2020-10-16 RX ORDER — PROPOFOL 10 MG/ML
VIAL (ML) INTRAVENOUS AS NEEDED
Status: DISCONTINUED | OUTPATIENT
Start: 2020-10-16 | End: 2020-10-16 | Stop reason: SURG

## 2020-10-16 RX ORDER — LIDOCAINE HYDROCHLORIDE 10 MG/ML
0.5 INJECTION, SOLUTION EPIDURAL; INFILTRATION; INTRACAUDAL; PERINEURAL ONCE AS NEEDED
Status: DISCONTINUED | OUTPATIENT
Start: 2020-10-16 | End: 2020-10-16 | Stop reason: HOSPADM

## 2020-10-16 RX ORDER — LABETALOL HYDROCHLORIDE 5 MG/ML
5 INJECTION, SOLUTION INTRAVENOUS
Status: DISCONTINUED | OUTPATIENT
Start: 2020-10-16 | End: 2020-10-16 | Stop reason: HOSPADM

## 2020-10-16 RX ORDER — DEXTROSE MONOHYDRATE 25 G/50ML
12.5 INJECTION, SOLUTION INTRAVENOUS AS NEEDED
Status: DISCONTINUED | OUTPATIENT
Start: 2020-10-16 | End: 2020-10-16 | Stop reason: HOSPADM

## 2020-10-16 RX ORDER — HYDROCODONE BITARTRATE AND ACETAMINOPHEN 10; 325 MG/1; MG/1
1 TABLET ORAL EVERY 4 HOURS PRN
Qty: 60 TABLET | Refills: 0 | Status: SHIPPED | OUTPATIENT
Start: 2020-10-16 | End: 2022-06-02

## 2020-10-16 RX ORDER — LIDOCAINE HYDROCHLORIDE 40 MG/ML
SOLUTION TOPICAL AS NEEDED
Status: DISCONTINUED | OUTPATIENT
Start: 2020-10-16 | End: 2020-10-16 | Stop reason: SURG

## 2020-10-16 RX ORDER — ROCURONIUM BROMIDE 10 MG/ML
INJECTION, SOLUTION INTRAVENOUS AS NEEDED
Status: DISCONTINUED | OUTPATIENT
Start: 2020-10-16 | End: 2020-10-16 | Stop reason: SURG

## 2020-10-16 RX ORDER — MAGNESIUM HYDROXIDE 1200 MG/15ML
LIQUID ORAL AS NEEDED
Status: DISCONTINUED | OUTPATIENT
Start: 2020-10-16 | End: 2020-10-16 | Stop reason: HOSPADM

## 2020-10-16 RX ORDER — DEXAMETHASONE SODIUM PHOSPHATE 4 MG/ML
INJECTION, SOLUTION INTRA-ARTICULAR; INTRALESIONAL; INTRAMUSCULAR; INTRAVENOUS; SOFT TISSUE AS NEEDED
Status: DISCONTINUED | OUTPATIENT
Start: 2020-10-16 | End: 2020-10-16 | Stop reason: SURG

## 2020-10-16 RX ORDER — SODIUM CHLORIDE 0.9 % (FLUSH) 0.9 %
10 SYRINGE (ML) INJECTION EVERY 12 HOURS SCHEDULED
Status: DISCONTINUED | OUTPATIENT
Start: 2020-10-16 | End: 2020-10-16 | Stop reason: HOSPADM

## 2020-10-16 RX ORDER — FENTANYL CITRATE 50 UG/ML
25 INJECTION, SOLUTION INTRAMUSCULAR; INTRAVENOUS
Status: DISCONTINUED | OUTPATIENT
Start: 2020-10-16 | End: 2020-10-16 | Stop reason: HOSPADM

## 2020-10-16 RX ORDER — OXYCODONE AND ACETAMINOPHEN 7.5; 325 MG/1; MG/1
2 TABLET ORAL ONCE AS NEEDED
Status: COMPLETED | OUTPATIENT
Start: 2020-10-16 | End: 2020-10-16

## 2020-10-16 RX ORDER — SODIUM CHLORIDE, SODIUM LACTATE, POTASSIUM CHLORIDE, CALCIUM CHLORIDE 600; 310; 30; 20 MG/100ML; MG/100ML; MG/100ML; MG/100ML
9 INJECTION, SOLUTION INTRAVENOUS CONTINUOUS
Status: DISCONTINUED | OUTPATIENT
Start: 2020-10-16 | End: 2020-10-16 | Stop reason: HOSPADM

## 2020-10-16 RX ORDER — ONDANSETRON 2 MG/ML
INJECTION INTRAMUSCULAR; INTRAVENOUS AS NEEDED
Status: DISCONTINUED | OUTPATIENT
Start: 2020-10-16 | End: 2020-10-16 | Stop reason: SURG

## 2020-10-16 RX ORDER — SODIUM CHLORIDE 0.9 % (FLUSH) 0.9 %
10 SYRINGE (ML) INJECTION AS NEEDED
Status: DISCONTINUED | OUTPATIENT
Start: 2020-10-16 | End: 2020-10-16 | Stop reason: HOSPADM

## 2020-10-16 RX ORDER — SODIUM CHLORIDE, SODIUM LACTATE, POTASSIUM CHLORIDE, CALCIUM CHLORIDE 600; 310; 30; 20 MG/100ML; MG/100ML; MG/100ML; MG/100ML
100 INJECTION, SOLUTION INTRAVENOUS CONTINUOUS PRN
Status: DISCONTINUED | OUTPATIENT
Start: 2020-10-16 | End: 2020-10-16 | Stop reason: HOSPADM

## 2020-10-16 RX ORDER — MIDAZOLAM HYDROCHLORIDE 1 MG/ML
1 INJECTION INTRAMUSCULAR; INTRAVENOUS
Status: COMPLETED | OUTPATIENT
Start: 2020-10-16 | End: 2020-10-16

## 2020-10-16 RX ORDER — ACETAMINOPHEN 500 MG
1000 TABLET ORAL ONCE
Status: COMPLETED | OUTPATIENT
Start: 2020-10-16 | End: 2020-10-16

## 2020-10-16 RX ORDER — BUPIVACAINE HCL/0.9 % NACL/PF 0.1 %
2 PLASTIC BAG, INJECTION (ML) EPIDURAL ONCE
Status: COMPLETED | OUTPATIENT
Start: 2020-10-16 | End: 2020-10-16

## 2020-10-16 RX ORDER — ONDANSETRON 2 MG/ML
4 INJECTION INTRAMUSCULAR; INTRAVENOUS AS NEEDED
Status: DISCONTINUED | OUTPATIENT
Start: 2020-10-16 | End: 2020-10-16 | Stop reason: HOSPADM

## 2020-10-16 RX ORDER — SUFENTANIL CITRATE 50 UG/ML
INJECTION EPIDURAL; INTRAVENOUS AS NEEDED
Status: DISCONTINUED | OUTPATIENT
Start: 2020-10-16 | End: 2020-10-16 | Stop reason: SURG

## 2020-10-16 RX ORDER — MORPHINE SULFATE 2 MG/ML
2 INJECTION, SOLUTION INTRAMUSCULAR; INTRAVENOUS
Status: DISCONTINUED | OUTPATIENT
Start: 2020-10-16 | End: 2020-10-16 | Stop reason: HOSPADM

## 2020-10-16 RX ORDER — NALOXONE HCL 0.4 MG/ML
0.04 VIAL (ML) INJECTION AS NEEDED
Status: DISCONTINUED | OUTPATIENT
Start: 2020-10-16 | End: 2020-10-16 | Stop reason: HOSPADM

## 2020-10-16 RX ORDER — SODIUM CHLORIDE 0.9 % (FLUSH) 0.9 %
3 SYRINGE (ML) INJECTION AS NEEDED
Status: DISCONTINUED | OUTPATIENT
Start: 2020-10-16 | End: 2020-10-16 | Stop reason: HOSPADM

## 2020-10-16 RX ORDER — OXYCODONE HCL 20 MG/1
20 TABLET, FILM COATED, EXTENDED RELEASE ORAL ONCE
Status: COMPLETED | OUTPATIENT
Start: 2020-10-16 | End: 2020-10-16

## 2020-10-16 RX ORDER — SODIUM CHLORIDE, SODIUM LACTATE, POTASSIUM CHLORIDE, CALCIUM CHLORIDE 600; 310; 30; 20 MG/100ML; MG/100ML; MG/100ML; MG/100ML
1000 INJECTION, SOLUTION INTRAVENOUS CONTINUOUS
Status: DISCONTINUED | OUTPATIENT
Start: 2020-10-16 | End: 2020-10-16 | Stop reason: HOSPADM

## 2020-10-16 RX ORDER — FLUMAZENIL 0.1 MG/ML
0.2 INJECTION INTRAVENOUS AS NEEDED
Status: DISCONTINUED | OUTPATIENT
Start: 2020-10-16 | End: 2020-10-16 | Stop reason: HOSPADM

## 2020-10-16 RX ORDER — NEOSTIGMINE METHYLSULFATE 5 MG/5 ML
SYRINGE (ML) INTRAVENOUS AS NEEDED
Status: DISCONTINUED | OUTPATIENT
Start: 2020-10-16 | End: 2020-10-16 | Stop reason: SURG

## 2020-10-16 RX ORDER — OXYCODONE AND ACETAMINOPHEN 10; 325 MG/1; MG/1
1 TABLET ORAL ONCE AS NEEDED
Status: DISCONTINUED | OUTPATIENT
Start: 2020-10-16 | End: 2020-10-16 | Stop reason: HOSPADM

## 2020-10-16 RX ADMIN — ROCURONIUM BROMIDE 30 MG: 10 INJECTION INTRAVENOUS at 09:03

## 2020-10-16 RX ADMIN — MIDAZOLAM HYDROCHLORIDE 1 MG: 2 INJECTION, SOLUTION INTRAMUSCULAR; INTRAVENOUS at 08:52

## 2020-10-16 RX ADMIN — ONDANSETRON HYDROCHLORIDE 4 MG: 2 SOLUTION INTRAMUSCULAR; INTRAVENOUS at 11:04

## 2020-10-16 RX ADMIN — ONDANSETRON HYDROCHLORIDE 4 MG: 2 SOLUTION INTRAMUSCULAR; INTRAVENOUS at 10:31

## 2020-10-16 RX ADMIN — MORPHINE SULFATE 2 MG: 2 INJECTION, SOLUTION INTRAMUSCULAR; INTRAVENOUS at 11:35

## 2020-10-16 RX ADMIN — SODIUM CHLORIDE, POTASSIUM CHLORIDE, SODIUM LACTATE AND CALCIUM CHLORIDE 1000 ML: 600; 310; 30; 20 INJECTION, SOLUTION INTRAVENOUS at 08:21

## 2020-10-16 RX ADMIN — SUFENTANIL CITRATE 10 MCG: 50 INJECTION EPIDURAL; INTRAVENOUS at 09:35

## 2020-10-16 RX ADMIN — MORPHINE SULFATE 2 MG: 2 INJECTION, SOLUTION INTRAMUSCULAR; INTRAVENOUS at 11:22

## 2020-10-16 RX ADMIN — GLYCOPYRROLATE 0.4 MG: 0.2 INJECTION, SOLUTION INTRAMUSCULAR; INTRAVENOUS at 10:31

## 2020-10-16 RX ADMIN — SODIUM CHLORIDE, POTASSIUM CHLORIDE, SODIUM LACTATE AND CALCIUM CHLORIDE: 600; 310; 30; 20 INJECTION, SOLUTION INTRAVENOUS at 10:40

## 2020-10-16 RX ADMIN — PROPOFOL 150 MG: 10 INJECTION, EMULSION INTRAVENOUS at 09:03

## 2020-10-16 RX ADMIN — MORPHINE SULFATE 2 MG: 2 INJECTION, SOLUTION INTRAMUSCULAR; INTRAVENOUS at 11:50

## 2020-10-16 RX ADMIN — SUFENTANIL CITRATE 15 MCG: 50 INJECTION EPIDURAL; INTRAVENOUS at 09:03

## 2020-10-16 RX ADMIN — ACETAMINOPHEN 1000 MG: 500 TABLET, FILM COATED ORAL at 08:41

## 2020-10-16 RX ADMIN — Medication 2 G: at 09:23

## 2020-10-16 RX ADMIN — LIDOCAINE HYDROCHLORIDE 80 MG: 20 INJECTION, SOLUTION INTRAVENOUS at 09:03

## 2020-10-16 RX ADMIN — MORPHINE SULFATE 2 MG: 2 INJECTION, SOLUTION INTRAMUSCULAR; INTRAVENOUS at 11:10

## 2020-10-16 RX ADMIN — MIDAZOLAM HYDROCHLORIDE 1 MG: 2 INJECTION, SOLUTION INTRAMUSCULAR; INTRAVENOUS at 08:41

## 2020-10-16 RX ADMIN — OXYCODONE HYDROCHLORIDE AND ACETAMINOPHEN 2 TABLET: 7.5; 325 TABLET ORAL at 12:16

## 2020-10-16 RX ADMIN — ROCURONIUM BROMIDE 20 MG: 10 INJECTION INTRAVENOUS at 09:40

## 2020-10-16 RX ADMIN — Medication 3 MG: at 10:31

## 2020-10-16 RX ADMIN — LIDOCAINE HYDROCHLORIDE 1 EACH: 40 SOLUTION TOPICAL at 09:03

## 2020-10-16 RX ADMIN — SUFENTANIL CITRATE 10 MCG: 50 INJECTION EPIDURAL; INTRAVENOUS at 10:26

## 2020-10-16 RX ADMIN — OXYCODONE HYDROCHLORIDE 20 MG: 20 TABLET, FILM COATED, EXTENDED RELEASE ORAL at 08:05

## 2020-10-16 RX ADMIN — DEXAMETHASONE SODIUM PHOSPHATE 8 MG: 4 INJECTION, SOLUTION INTRAMUSCULAR; INTRAVENOUS at 09:20

## 2020-10-16 NOTE — ANESTHESIA PROCEDURE NOTES
Airway  Urgency: elective    Date/Time: 10/16/2020 9:05 AM  Airway not difficult    General Information and Staff    Patient location during procedure: OR  CRNA: Meghan Tolentino CRNA    Indications and Patient Condition  Indications for airway management: airway protection    Preoxygenated: yes  Mask difficulty assessment: 1 - vent by mask    Final Airway Details  Final airway type: endotracheal airway      Successful airway: ETT  Cuffed: yes   Successful intubation technique: direct laryngoscopy  Facilitating devices/methods: intubating stylet  Endotracheal tube insertion site: oral  Blade: Morgan  Blade size: 3.5.  ETT size (mm): 7.0  Cormack-Lehane Classification: grade I - full view of glottis  Placement verified by: chest auscultation and capnometry   Cuff volume (mL): 5  Measured from: teeth  ETT/EBT  to teeth (cm): 21  Number of attempts at approach: 1  Assessment: lips, teeth, and gum same as pre-op and atraumatic intubation

## 2020-10-16 NOTE — ANESTHESIA PREPROCEDURE EVALUATION
Anesthesia Evaluation     Patient summary reviewed   no history of anesthetic complications:  NPO Solid Status: > 8 hours             Airway   Mallampati: II  TM distance: >3 FB  Neck ROM: full  Dental      Pulmonary    (+) a smoker (vapes),   (-) COPD, asthma, sleep apnea  Cardiovascular   Exercise tolerance: excellent (>7 METS)    ECG reviewed    (-) pacemaker, past MI, angina, cardiac stents      Neuro/Psych  (-) seizures, TIA, CVA  GI/Hepatic/Renal/Endo    (+) obesity,     (-) GERD, liver disease, no renal disease, diabetes    Musculoskeletal     Abdominal    Substance History      OB/GYN    (-)  Pregnant        Other                        Anesthesia Plan    ASA 2     general     intravenous induction     Anesthetic plan, all risks, benefits, and alternatives have been provided, discussed and informed consent has been obtained with: patient.

## 2020-10-16 NOTE — DISCHARGE INSTRUCTIONS
YOUR NEXT PAIN MEDICATION IS DUE AT______4:16 pm_______         General Anesthesia, Adult, Care After  Refer to this sheet in the next few weeks. These instructions provide you with information on caring for yourself after your procedure. Your health care provider may also give you more specific instructions. Your treatment has been planned according to current medical practices, but problems sometimes occur. Call your health care provider if you have any problems or questions after your procedure.  WHAT TO EXPECT AFTER THE PROCEDURE  After the procedure, it is typical to experience:  · Sleepiness.  · Nausea and vomiting.  HOME CARE INSTRUCTIONS  · For the first 24 hours after general anesthesia:  ¨ Have a responsible person with you.  ¨ Do not drive a car. If you are alone, do not take public transportation.  ¨ Do not drink alcohol.  ¨ Do not take medicine that has not been prescribed by your health care provider.  ¨ Do not sign important papers or make important decisions.  ¨ You may resume a normal diet and activities as directed by your health care provider.  · Change bandages (dressings) as directed.  · If you have questions or problems that seem related to general anesthesia, call the hospital and ask for the anesthetist or anesthesiologist on call.  SEEK MEDICAL CARE IF:  · You have nausea and vomiting that continue the day after anesthesia.  · You develop a rash.  SEEK IMMEDIATE MEDICAL CARE IF:    · You have difficulty breathing.  · You have chest pain.  · You have any allergic problems.     This information is not intended to replace advice given to you by your health care provider. Make sure you discuss any questions you have with your health care provider.     Document Released: 03/26/2002 Document Revised: 01/08/2016 Document Reviewed: 07/03/2014  [employer] Patient Education ©2016 [employer]    CALL YOUR PHYSICIAN IF YOU EXPERIENCE  INCREASED PAIN NOT HELPED BY YOUR PAIN  MEDICATION.    .                                              Fall Prevention in the Home      Falls can cause injuries. They can happen to people of all ages. There are many things you can do to make your home safe and to help prevent falls.    WHAT CAN I DO ON THE OUTSIDE OF MY HOME?  · Regularly fix the edges of walkways and driveways and fix any cracks.  · Remove anything that might make you trip as you walk through a door, such as a raised step or threshold.  · Trim any bushes or trees on the path to your home.  · Use bright outdoor lighting.  · Clear any walking paths of anything that might make someone trip, such as rocks or tools.  · Regularly check to see if handrails are loose or broken. Make sure that both sides of any steps have handrails.  · Any raised decks and porches should have guardrails on the edges.  · Have any leaves, snow, or ice cleared regularly.  · Use sand or salt on walking paths during winter.  · Clean up any spills in your garage right away. This includes oil or grease spills.  WHAT CAN I DO IN THE BATHROOM?    · Use night lights.  · Install grab bars by the toilet and in the tub and shower. Do not use towel bars as grab bars.  · Use non-skid mats or decals in the tub or shower.  · If you need to sit down in the shower, use a plastic, non-slip stool.  · Keep the floor dry. Clean up any water that spills on the floor as soon as it happens.  · Remove soap buildup in the tub or shower regularly.  · Attach bath mats securely with double-sided non-slip rug tape.  · Do not have throw rugs and other things on the floor that can make you trip.  WHAT CAN I DO IN THE BEDROOM?  · Use night lights.  · Make sure that you have a light by your bed that is easy to reach.  · Do not use any sheets or blankets that are too big for your bed. They should not hang down onto the floor.  · Have a firm chair that has side arms. You can use this for support while you get dressed.  · Do not have throw rugs and  other things on the floor that can make you trip.  WHAT CAN I DO IN THE KITCHEN?  · Clean up any spills right away.  · Avoid walking on wet floors.  · Keep items that you use a lot in easy-to-reach places.  · If you need to reach something above you, use a strong step stool that has a grab bar.  · Keep electrical cords out of the way.  · Do not use floor polish or wax that makes floors slippery. If you must use wax, use non-skid floor wax.  · Do not have throw rugs and other things on the floor that can make you trip.  WHAT CAN I DO WITH MY STAIRS?  · Do not leave any items on the stairs.  · Make sure that there are handrails on both sides of the stairs and use them. Fix handrails that are broken or loose. Make sure that handrails are as long as the stairways.  · Check any carpeting to make sure that it is firmly attached to the stairs. Fix any carpet that is loose or worn.  · Avoid having throw rugs at the top or bottom of the stairs. If you do have throw rugs, attach them to the floor with carpet tape.  · Make sure that you have a light switch at the top of the stairs and the bottom of the stairs. If you do not have them, ask someone to add them for you.  WHAT ELSE CAN I DO TO HELP PREVENT FALLS?  · Wear shoes that:  ¨ Do not have high heels.  ¨ Have rubber bottoms.  ¨ Are comfortable and fit you well.  ¨ Are closed at the toe. Do not wear sandals.  · If you use a stepladder:  ¨ Make sure that it is fully opened. Do not climb a closed stepladder.  ¨ Make sure that both sides of the stepladder are locked into place.  ¨ Ask someone to hold it for you, if possible.  · Clearly emely and make sure that you can see:  ¨ Any grab bars or handrails.  ¨ First and last steps.  ¨ Where the edge of each step is.  · Use tools that help you move around (mobility aids) if they are needed. These include:  ¨ Canes.  ¨ Walkers.  ¨ Scooters.  ¨ Crutches.  · Turn on the lights when you go into a dark area. Replace any light bulbs as  soon as they burn out.  · Set up your furniture so you have a clear path. Avoid moving your furniture around.  · If any of your floors are uneven, fix them.  · If there are any pets around you, be aware of where they are.  · Review your medicines with your doctor. Some medicines can make you feel dizzy. This can increase your chance of falling.  Ask your doctor what other things that you can do to help prevent falls.     This information is not intended to replace advice given to you by your health care provider. Make sure you discuss any questions you have with your health care provider.     Document Released: 10/14/2010 Document Revised: 05/03/2016 Document Reviewed: 01/22/2016  [employer] Interactive Patient Education ©2016 [employer]     PATIENT/FAMILY/RESPONSIBLE PARTY VERBALIZES UNDERSTANDING OF ABOVE EDUCATION.  COPY OF PAIN SCALE GIVEN AND REVIEWED WITH VERBALIZED UNDERSTANDING.

## 2020-10-16 NOTE — OP NOTE
Lumbar microdiscectomy procedure Note    Nicole Ibarra  10/16/2020    Pre-op Diagnosis:     1. Severe left buttock, thigh and leg radiculopathy.  2. Left anterior tibialis weakness.  3. Degenerative disk disease L4 to S1.  4. Loss of lordosis with focal kyphosis L4 to S1.  5. Disk herniation, left L5-S1.  6. Central and severe left-sided foraminal stenosis, L5-S1.  7. Obesity, BMI 37.76.    Post-op Diagnosis:    same    Procedure/CPT® Codes:    1.  Left L5-S1 hemilaminotomy, partial medial facetectomy, microdiscectomy  2.  Use of fluoroscopy for confirmation of surgical level  3.  Use of operating room microscope for decompression and microdissection    Anesthesia: General    Surgeon: BELEM Min MD    Assistant: Jame Ford PA-C    Estimated Blood Loss: minimal    Complications: None    Condition: Stable to PACU.    Indications:    The patient is a 35-year-old who sees Dr. Anthony Lawler for medical issues.  She presented to the office with severe left buttock, thigh, and leg radiculopathy.  On exam, she was noted to have weakness in her left anterior tibialis.  Imaging studies revealed degenerative changes from L4-S1 with focal kyphosis at both levels.  More portly however she was noted to have a disc herniation on the left side of L5-S1 causing central and severe left-sided foraminal stenosis.    After failing conservative measures, it was mutually decided that surgery would be the best option. Risks, benefits, and complications of surgery were discussed with the patient. The patient appeared well informed and wished to proceed. We specifically discussed the risk of infection, blood loss, nerve root injury, CSF leak, and the possibility of incomplete resolution of symptoms. We also discussed the risk of a recurrent disc herniation and the possible need for additional surgery potentially involving a fusion procedure at some point in the future.    Operative Procedure:    After obtaining informed consent and  verifying the correct operative site, the patient was brought to the operating room.  A general anesthetic was provided by the anesthesia service with the assistance of an endotracheal tube.  Once this was appropriately positioned and secured, the patient was carefully rotated prone onto a Charles frame.  The table was flexed slightly to decrease the lordosis of the lumbar spine.  All bony processes were well-padded.  The lumbar region was prepped and draped in usual sterile fashion.  A surgical timeout was taken to confirm this was the correct patient, we are working at the correct level, and that preoperative antibiotics were given in a timely fashion.    Using fluoroscopy for guidance, a midline incision was created using a 10 blade scalpel spanning approximately L5 to S1.  Dissection was carried through subcutaneous tissues using Bovie cautery.  The dorsolumbar fascia was divided in line with the incision and dissection was carried to the left side of the spinous processes of L5 and S1.  The interlaminar space of L5-S1 was exposed paying close attention not to violate the facet joint capsule.  Dissection was carried laterally superior to the facets exposing the pars area of L5 as an anatomic landmark.  Self-retaining retractors were placed for continuous exposure.  A forward angled curette was placed under the lamina of L5 and fluoroscopy was used to confirm we were at the correct level.  The microscope was then positioned for the microdissection and decompression portion of the procedure.    A high-speed bur was then used to create a hemilaminotomy at L5-S1.  It was also used to create a partial medial facetectomy.  Bleeding bone edges were controlled using bone wax.  A forward angled curette was used to free up the undersurface of the L5 lamina releasing the ligamentum flavum.  The ligamentum flavum was also resected off the leading edge of S1 revealing the central dural sac below.  The ligamentum flavum was  resected using Kerrisons.  As soon as remove the ligamentum flavum, we saw herniated disc material from the disc space in the axilla of the exiting L5 nerve root causing severe stenosis after carefully delineating the central dural sac, L5, and S1, I then remove the disc material using pituitary and backdown curettes.  Epidural veins were coagulated using bipolar cautery.  A nerve root retractor was used to gently retract S1 and the central dural sac medially revealing the annular defect below.  More disc material was identified within the epidural space and removed using pituitaries and background curettes.  I probed the annular defect and did not feel that it would be necessary to actually remove disc material from L5-S1.    The epidural space was then thoroughly irrigated with saline solution.  A final inspection of the epidural space was then undertaken to ensure that we had retrieved all disc material.  Bleeding was then controlled using bipolar cautery and FloSeal.    Closure was accomplished by reapproximating the fascia with a #1 Vicryl.  This was also used to reapproximate the subcutaneous fat layer.  Subcutaneous layer itself was closed using a 2-0 Vicryl.  Skin closure was then augmented using Mastisol and Steri-Strips.  The wound was then washed and covered with a Bioclusive dressing.  I did infuse the skin with half percent Marcaine and Exparel to assist with postoperative pain control prior to closure.    The patient tolerated the procedure well.  There were no complications.  After applying the dressing, the patient was carefully rotated supine onto a hospital gurney, extubated, and sent to the recovery room in good stable condition.    Jame Ford PA-C provided critical assistance during the procedure.  His assistance was medically necessary in order to allow the procedure to occur in the most safe and efficient manner.    BELEM Min MD     Date: 10/16/2020  Time: 15:50 CDT

## 2020-10-16 NOTE — ANESTHESIA POSTPROCEDURE EVALUATION
Patient: Nicole Ibarra    Procedure Summary     Date: 10/16/20 Room / Location:  [facility]  [facility]    Anesthesia Start: 0858 Anesthesia Stop: 1052    Procedure: LEFT L5-S1 MICRODISCECTOMY (Left Spine Lumbar) Diagnosis: (M51.27)    Surgeon: RUBIA Min MD Provider: Meghan Tolentino CRNA    Anesthesia Type: general ASA Status: 2          Anesthesia Type: general    Vitals  Vitals Value Taken Time   /72 10/16/20 1215   Temp 98.4 °F (36.9 °C) 10/16/20 1205   Pulse 122 10/16/20 1215   Resp 20 10/16/20 1215   SpO2 98 % 10/16/20 1215           Post Anesthesia Care and Evaluation    PONV Status: none  Comments: Patient d/c from PACU prior to anes eval based on Chris score.  Please see RN notes for details of d/c criteria.    Blood pressure 123/72, pulse 114, temperature 98.4 °F (36.9 °C), temperature source Temporal, resp. rate 20, SpO2 98 %, not currently breastfeeding.

## 2020-12-10 ENCOUNTER — APPOINTMENT (OUTPATIENT)
Dept: GENERAL RADIOLOGY | Facility: HOSPITAL | Age: 35
End: 2020-12-10

## 2020-12-10 ENCOUNTER — HOSPITAL ENCOUNTER (EMERGENCY)
Facility: HOSPITAL | Age: 35
Discharge: HOME | End: 2020-12-10
Attending: EMERGENCY MEDICINE | Admitting: EMERGENCY MEDICINE

## 2020-12-10 VITALS
HEIGHT: 64 IN | WEIGHT: 215 LBS | BODY MASS INDEX: 36.7 KG/M2 | SYSTOLIC BLOOD PRESSURE: 115 MMHG | HEART RATE: 98 BPM | TEMPERATURE: 98.2 F | OXYGEN SATURATION: 99 % | RESPIRATION RATE: 18 BRPM | DIASTOLIC BLOOD PRESSURE: 82 MMHG

## 2020-12-10 DIAGNOSIS — S93.402A SPRAIN OF LEFT ANKLE, UNSPECIFIED LIGAMENT, INITIAL ENCOUNTER: Primary | ICD-10-CM

## 2020-12-10 PROCEDURE — 73610 X-RAY EXAM OF ANKLE: CPT

## 2020-12-10 PROCEDURE — 63710000001 ONDANSETRON ODT 4 MG TABLET DISPERSIBLE: Performed by: EMERGENCY MEDICINE

## 2020-12-10 PROCEDURE — 73590 X-RAY EXAM OF LOWER LEG: CPT

## 2020-12-10 PROCEDURE — 25010000003 HYDROMORPHONE 1 MG/ML SOLUTION: Performed by: EMERGENCY MEDICINE

## 2020-12-10 PROCEDURE — 96372 THER/PROPH/DIAG INJ SC/IM: CPT

## 2020-12-10 PROCEDURE — 99283 EMERGENCY DEPT VISIT LOW MDM: CPT

## 2020-12-10 RX ORDER — ONDANSETRON 4 MG/1
4 TABLET, ORALLY DISINTEGRATING ORAL ONCE
Status: COMPLETED | OUTPATIENT
Start: 2020-12-10 | End: 2020-12-10

## 2020-12-10 RX ORDER — OXYCODONE HYDROCHLORIDE AND ACETAMINOPHEN 5; 325 MG/1; MG/1
1 TABLET ORAL EVERY 6 HOURS PRN
Qty: 8 TABLET | Refills: 0 | Status: SHIPPED | OUTPATIENT
Start: 2020-12-10 | End: 2020-12-10 | Stop reason: SDUPTHER

## 2020-12-10 RX ADMIN — ONDANSETRON 4 MG: 4 TABLET, ORALLY DISINTEGRATING ORAL at 19:05

## 2020-12-10 RX ADMIN — HYDROMORPHONE HYDROCHLORIDE 1 MG: 1 INJECTION, SOLUTION INTRAMUSCULAR; INTRAVENOUS; SUBCUTANEOUS at 19:05

## 2020-12-11 NOTE — ED PROVIDER NOTES
Subjective   34 y/o female arrives for evaluation of left ankle pain after she rolled it when she was walking. She tells me she has had back surgery in the past and since that time she has decreased feeling in her feet. She tells me because of this it is hard to walk and she will sometimes fall. She denies any new numbness or weakness, fevers or chills, back or hip pain. She denies any head trauma, weakness or any anticoagulation. She arrives in NAD.           Review of Systems   All other systems reviewed and are negative.      Past Medical History:   Diagnosis Date   • ADHD (attention deficit hyperactivity disorder)     pt states as a child   • Foot drop, left foot    • Herniation of intervertebral disc between L5 and S1    • Irritable bowel syndrome    • Lumbosacral radiculopathy 10/16/2020   • Sciatic nerve pain, left        Allergies   Allergen Reactions   • Antibiotic Ear [Neomycin-Polymyxin-Hc] Unknown (See Comments)     cyclomine family    • Doxycycline Anaphylaxis   • Latex Rash       Past Surgical History:   Procedure Laterality Date   • APPENDECTOMY     • COLONOSCOPY     • LUMBAR DISCECTOMY Left 10/16/2020    Procedure: LEFT L5-S1 MICRODISCECTOMY;  Surgeon: RUBIA Min MD;  Location: [facility];  Service: Orthopedic Spine;  Laterality: Left;       Family History   Problem Relation Age of Onset   • No Known Problems Mother    • COPD Father    • Heart disease Father    • Diabetes Father    • Heart failure Father    • Atrial fibrillation Father        Social History     Socioeconomic History   • Marital status: Single     Spouse name: Not on file   • Number of children: Not on file   • Years of education: Not on file   • Highest education level: Not on file   Tobacco Use   • Smoking status: Former Smoker     Packs/day: 0.50     Types: Cigarettes     Quit date:      Years since quittin.9   • Smokeless tobacco: Never Used   Substance and Sexual Activity   • Alcohol use: Not Currently   • Drug  use: No   • Sexual activity: Yes     Partners: Male           Objective   Physical Exam  Vitals signs and nursing note reviewed.   Constitutional:       Appearance: Normal appearance.   HENT:      Head: Normocephalic and atraumatic.      Nose: Nose normal.      Mouth/Throat:      Mouth: Mucous membranes are moist.      Pharynx: Oropharynx is clear.   Eyes:      Conjunctiva/sclera: Conjunctivae normal.      Pupils: Pupils are equal, round, and reactive to light.   Neck:      Musculoskeletal: Normal range of motion and neck supple.   Musculoskeletal:         General: Swelling, tenderness and signs of injury present.      Comments: At the left ankle there is swelling laterally > medially she has intact pulses she can move her toes and states her sensation is unchanged. No pain to the fibular head.    Skin:     General: Skin is warm and dry.      Capillary Refill: Capillary refill takes less than 2 seconds.   Neurological:      General: No focal deficit present.      Mental Status: She is alert and oriented to person, place, and time.   Psychiatric:         Mood and Affect: Mood normal.         Behavior: Behavior normal.         Thought Content: Thought content normal.         Procedures           ED Course  ED Course as of Dec 10 1952   Thu Dec 10, 2020   1952 Patient is already on pain medication.     [JH]      ED Course User Index  [JH] Francis Blandon MD      No fracture on xray will encourage rice and follow up.       XR Tibia Fibula 2 View Left   Final Result   1. No evidence of acute fracture.   2. Soft tissue swelling.       This report was finalized on 12/10/2020 19:35 by Dr. Santi Lee MD.      XR Ankle 3+ View Left   Final Result   1. No evidence of acute fracture.   2. Soft tissue swelling.           This report was finalized on 12/10/2020 19:33 by Dr. Santi Lee MD.        Labs Reviewed - No data to display                                    MDM    Final diagnoses:   Sprain of left ankle,  unspecified ligament, initial encounter            Francis Blandon MD  12/10/20 1947       Francis Blandon MD  12/10/20 1952

## 2021-05-20 ENCOUNTER — TELEPHONE (OUTPATIENT)
Dept: VASCULAR SURGERY | Facility: CLINIC | Age: 36
End: 2021-05-20

## 2021-06-02 ENCOUNTER — TELEPHONE (OUTPATIENT)
Dept: PODIATRY | Facility: CLINIC | Age: 36
End: 2021-06-02

## 2021-06-03 ENCOUNTER — OFFICE VISIT (OUTPATIENT)
Dept: PODIATRY | Facility: CLINIC | Age: 36
End: 2021-06-03

## 2021-06-03 VITALS
HEART RATE: 119 BPM | SYSTOLIC BLOOD PRESSURE: 122 MMHG | HEIGHT: 64 IN | DIASTOLIC BLOOD PRESSURE: 64 MMHG | OXYGEN SATURATION: 97 % | BODY MASS INDEX: 37.18 KG/M2 | WEIGHT: 217.8 LBS

## 2021-06-03 DIAGNOSIS — M21.372 FOOT DROP, LEFT: ICD-10-CM

## 2021-06-03 DIAGNOSIS — M79.672 FOOT PAIN, BILATERAL: ICD-10-CM

## 2021-06-03 DIAGNOSIS — M79.671 FOOT PAIN, BILATERAL: ICD-10-CM

## 2021-06-03 DIAGNOSIS — M21.41 PES PLANUS OF BOTH FEET: ICD-10-CM

## 2021-06-03 DIAGNOSIS — M21.42 PES PLANUS OF BOTH FEET: ICD-10-CM

## 2021-06-03 DIAGNOSIS — M76.821 POSTERIOR TIBIAL TENDON DYSFUNCTION (PTTD) OF RIGHT LOWER EXTREMITY: Primary | ICD-10-CM

## 2021-06-03 DIAGNOSIS — M51.27 HERNIATION OF INTERVERTEBRAL DISC BETWEEN L5 AND S1: ICD-10-CM

## 2021-06-03 DIAGNOSIS — M25.372 ANKLE INSTABILITY, LEFT: ICD-10-CM

## 2021-06-03 PROCEDURE — 99203 OFFICE O/P NEW LOW 30 MIN: CPT | Performed by: PODIATRIST

## 2021-06-03 RX ORDER — DICLOFENAC SODIUM 75 MG/1
75 TABLET, DELAYED RELEASE ORAL 2 TIMES DAILY
COMMUNITY
Start: 2021-05-22 | End: 2021-08-03

## 2021-06-03 RX ORDER — AMOXICILLIN 500 MG/1
CAPSULE ORAL
COMMUNITY
Start: 2021-03-23 | End: 2021-06-03

## 2021-06-03 RX ORDER — GABAPENTIN 300 MG/1
CAPSULE ORAL
COMMUNITY
Start: 2021-06-01 | End: 2021-06-23

## 2021-06-03 RX ORDER — AMITRIPTYLINE HYDROCHLORIDE 50 MG/1
TABLET, FILM COATED ORAL EVERY 24 HOURS
COMMUNITY
End: 2021-06-03

## 2021-06-03 RX ORDER — DULOXETIN HYDROCHLORIDE 30 MG/1
60 CAPSULE, DELAYED RELEASE ORAL 2 TIMES DAILY
COMMUNITY
Start: 2021-05-15 | End: 2022-06-02

## 2021-06-10 ENCOUNTER — TELEPHONE (OUTPATIENT)
Dept: NEUROSURGERY | Age: 36
End: 2021-06-10

## 2021-06-10 NOTE — TELEPHONE ENCOUNTER
1st attempt to call patient to schedule appointment, left voicemail with call back number [phone]

## 2021-06-11 ENCOUNTER — TELEPHONE (OUTPATIENT)
Dept: NEUROSURGERY | Age: 36
End: 2021-06-11

## 2021-06-11 NOTE — TELEPHONE ENCOUNTER
[facility] New Patient Questionnaire    1. Diagnosis/Reason for Referral?    2nd opinion surgery 7-6-21, Radiculopathy lumbar region    2. Who is completing questionnaire? Patient  xCaregiver Family      3. Has the patient had any previous spinal/brain surgeries? yes        A. If yes, what is the name of the facility in which the surgery was performed? [facility]       B. Procedure/Surgery performed? L5, S1 herniated disc     C. Who was the surgeon? Dr. Susan LI When was the surgery? 2020   E. Did the patient improve after the surgery? no        4. Is this a second opinion? If yes, Dr. Joey Mccollum would like to review patient first before making the appointment. 5. Have MRI Images been obtain within the last year? Yes x No      XR  CT     If yes, where was the imaging performed?[facility]   If yes, what part of the body? Lumbar x Cervical  Thoracic  Brain     If yes, when was it obtained?      5-21    Note: if the scan was performed at a facility other than [facility], the disc will need to be brought to the appointment or we need to reach out to obtain the disc. A. Was the patient instructed to provide the disc? Yes  x No      8. Has the patient had a NCV/EMG within the last year? Yes  [facility]     If yes, where was it performed and date? MM/YY  Location:      9. Has the patient been to Physical Therapy? Yes  No     If yes, what location, how long attended, and last visit? Location: [facility]       Therapy Lasted: 3 weeks   Date of Last Visit: 2-3 months      10. Has the patient been to Pain Management? Yes  No x     If yes, what location and last visit     Location:   Last Visit:   Is it helping?

## 2021-06-23 ENCOUNTER — HOSPITAL ENCOUNTER (OUTPATIENT)
Dept: GENERAL RADIOLOGY | Facility: HOSPITAL | Age: 36
Discharge: HOME | End: 2021-06-23

## 2021-06-23 ENCOUNTER — PRE-ADMISSION TESTING (OUTPATIENT)
Dept: PREADMISSION TESTING | Facility: HOSPITAL | Age: 36
End: 2021-06-23

## 2021-06-23 VITALS
SYSTOLIC BLOOD PRESSURE: 124 MMHG | WEIGHT: 216.71 LBS | DIASTOLIC BLOOD PRESSURE: 93 MMHG | BODY MASS INDEX: 36.11 KG/M2 | OXYGEN SATURATION: 97 % | HEART RATE: 99 BPM | RESPIRATION RATE: 18 BRPM | HEIGHT: 65 IN

## 2021-06-23 LAB
ALBUMIN SERPL-MCNC: 4.2 G/DL (ref 3.5–5.2)
ALBUMIN/GLOB SERPL: 1.4 G/DL
ALP SERPL-CCNC: 88 U/L (ref 39–117)
ALT SERPL W P-5'-P-CCNC: 16 U/L (ref 1–33)
ANION GAP SERPL CALCULATED.3IONS-SCNC: 10 MMOL/L (ref 5–15)
APTT PPP: 33.6 SECONDS (ref 24.1–35)
AST SERPL-CCNC: 18 U/L (ref 1–32)
BASOPHILS # BLD AUTO: 0.06 10*3/MM3 (ref 0–0.2)
BASOPHILS NFR BLD AUTO: 0.6 % (ref 0–1.5)
BILIRUB SERPL-MCNC: 0.5 MG/DL (ref 0–1.2)
BILIRUB UR QL STRIP: NEGATIVE
BUN SERPL-MCNC: 9 MG/DL (ref 6–20)
BUN/CREAT SERPL: 14.1 (ref 7–25)
CALCIUM SPEC-SCNC: 9.9 MG/DL (ref 8.6–10.5)
CHLORIDE SERPL-SCNC: 101 MMOL/L (ref 98–107)
CLARITY UR: CLEAR
CO2 SERPL-SCNC: 27 MMOL/L (ref 22–29)
COLOR UR: YELLOW
CREAT SERPL-MCNC: 0.64 MG/DL (ref 0.57–1)
DEPRECATED RDW RBC AUTO: 43.7 FL (ref 37–54)
EOSINOPHIL # BLD AUTO: 0.54 10*3/MM3 (ref 0–0.4)
EOSINOPHIL NFR BLD AUTO: 5 % (ref 0.3–6.2)
ERYTHROCYTE [DISTWIDTH] IN BLOOD BY AUTOMATED COUNT: 13.8 % (ref 12.3–15.4)
GFR SERPL CREATININE-BSD FRML MDRD: 104 ML/MIN/1.73
GLOBULIN UR ELPH-MCNC: 3 GM/DL
GLUCOSE SERPL-MCNC: 104 MG/DL (ref 65–99)
GLUCOSE UR STRIP-MCNC: NEGATIVE MG/DL
HCT VFR BLD AUTO: 40.2 % (ref 34–46.6)
HGB BLD-MCNC: 13.5 G/DL (ref 12–15.9)
HGB UR QL STRIP.AUTO: NEGATIVE
IMM GRANULOCYTES # BLD AUTO: 0.03 10*3/MM3 (ref 0–0.05)
IMM GRANULOCYTES NFR BLD AUTO: 0.3 % (ref 0–0.5)
INR PPP: 1 (ref 0.91–1.09)
KETONES UR QL STRIP: NEGATIVE
LEUKOCYTE ESTERASE UR QL STRIP.AUTO: NEGATIVE
LYMPHOCYTES # BLD AUTO: 3.74 10*3/MM3 (ref 0.7–3.1)
LYMPHOCYTES NFR BLD AUTO: 34.9 % (ref 19.6–45.3)
MCH RBC QN AUTO: 29 PG (ref 26.6–33)
MCHC RBC AUTO-ENTMCNC: 33.6 G/DL (ref 31.5–35.7)
MCV RBC AUTO: 86.5 FL (ref 79–97)
MONOCYTES # BLD AUTO: 0.82 10*3/MM3 (ref 0.1–0.9)
MONOCYTES NFR BLD AUTO: 7.6 % (ref 5–12)
NEUTROPHILS NFR BLD AUTO: 5.54 10*3/MM3 (ref 1.7–7)
NEUTROPHILS NFR BLD AUTO: 51.6 % (ref 42.7–76)
NITRITE UR QL STRIP: NEGATIVE
NRBC BLD AUTO-RTO: 0 /100 WBC (ref 0–0.2)
PH UR STRIP.AUTO: 6 [PH] (ref 5–8)
PLATELET # BLD AUTO: 327 10*3/MM3 (ref 140–450)
PMV BLD AUTO: 9.6 FL (ref 6–12)
POTASSIUM SERPL-SCNC: 3.9 MMOL/L (ref 3.5–5.2)
PROT SERPL-MCNC: 7.2 G/DL (ref 6–8.5)
PROT UR QL STRIP: NEGATIVE
PROTHROMBIN TIME: 12.4 SECONDS (ref 11.5–13.4)
RBC # BLD AUTO: 4.65 10*6/MM3 (ref 3.77–5.28)
SODIUM SERPL-SCNC: 138 MMOL/L (ref 136–145)
SP GR UR STRIP: <=1.005 (ref 1–1.03)
UROBILINOGEN UR QL STRIP: NORMAL
WBC # BLD AUTO: 10.73 10*3/MM3 (ref 3.4–10.8)

## 2021-06-23 PROCEDURE — 93005 ELECTROCARDIOGRAM TRACING: CPT

## 2021-06-23 PROCEDURE — 71046 X-RAY EXAM CHEST 2 VIEWS: CPT

## 2021-06-23 PROCEDURE — 85730 THROMBOPLASTIN TIME PARTIAL: CPT

## 2021-06-23 PROCEDURE — 80053 COMPREHEN METABOLIC PANEL: CPT

## 2021-06-23 PROCEDURE — 36415 COLL VENOUS BLD VENIPUNCTURE: CPT

## 2021-06-23 PROCEDURE — 85610 PROTHROMBIN TIME: CPT

## 2021-06-23 PROCEDURE — 81003 URINALYSIS AUTO W/O SCOPE: CPT

## 2021-06-23 PROCEDURE — 85025 COMPLETE CBC W/AUTO DIFF WBC: CPT

## 2021-06-23 PROCEDURE — 93010 ELECTROCARDIOGRAM REPORT: CPT | Performed by: INTERNAL MEDICINE

## 2021-06-23 NOTE — DISCHARGE INSTRUCTIONS
DAY OF SURGERY INSTRUCTIONS        YOUR SURGEON: ***STRENGE     PROCEDURE: ***REVISION LEFT L5-S1  HEMILAMINECTOMY, FACETECTOMY, DISCECTOMY, TRANSFORAMINAL LUMBAR INTERBODY FUSION WITH INSTRUMENTATION    DATE OF SURGERY: ***7/8/2021    ARRIVAL TIME: AS DIRECTED BY OFFICE    YOU MAY TAKE THE FOLLOWING MEDICATION(S) THE MORNING OF SURGERY WITH A SIP OF WATER: ***GABAPENTIN, NORCO, CYMBALTA       ALL OTHER HOME MEDICATION CHECK WITH YOUR PHYSICIAN                MANAGING PAIN AFTER SURGERY    We know you are probably wondering what your pain will be like after surgery.  Following surgery it is unrealistic to expect you will not have pain.   Pain is how our bodies let us know that something is wrong or cautions us to be careful.  That said, our goal is to make your pain tolerable.    Methods we may use to treat your pain include (oral or IV medications, PCAs, epidurals, nerve blocks, etc.)   While some procedures require IV pain medications for a short time after surgery, transitioning to pain medications by mouth allows for better management of pain.   Your nurse will encourage you to take oral pain medications whenever possible.  IV medications work almost immediately, but only last a short while.  Taking medications by mouth allows for a more constant level of medication in your blood stream for a longer period of time.      Once your pain is out of control it is harder to get back under control.  It is important you are aware when your next dose of pain medication is due.  If you are admitted, your nurse may write the time of your next dose on the white board in your room to help you remember.      We are interested in your pain and encourage you to inform us about aggravating factors during your visit.   Many times a simple repositioning every few hours can make a big difference.    If your physician says it is okay, do not let your pain prevent you from getting out of bed. Be sure to call your nurse for  assistance prior to getting up so you do not fall.      Before surgery, please decide your tolerable pain goal.  These faces help describe the pain ratings we use on a 0-10 scale.   Be prepared to tell us your goal and whether or not you take pain or anxiety medications at home.          BEFORE YOU COME TO THE HOSPITAL  (Pre-op instructions)  • Do not eat, drink, smoke or chew gum after midnight the night before surgery.  This also includes no mints.  • Morning of surgery take only the medicines you have been instructed with a sip of water unless otherwise instructed  by your physician.  • Do not shave, wear makeup or dark nail polish.  • Remove all jewelry including rings.  • Leave anything you consider valuable at home.  • Leave your suitcase in the car until after your surgery.  • Bring the following with you if applicable:  o Picture ID and insurance, Medicare or Medicaid cards  o Co-pay/deductible required by insurance (cash, check, credit card)  o Copy of advance directive, living will or power-of- documents if not brought to PAT  o CPAP or BIPAP mask and tubing  o Relaxation aids ( book, magazine), etc.  o Hearing aids                        ON THE DAY OF SURGERY  · On the day of surgery check in at registration located at the main entrance of the hospital.   ? You will be registered and given a beeper with instructions where to wait in the main lobby.  ? When your beeper lights up and vibrates a member of the Outpatient Surgery staff will meet you at the double doors under the stair steps and escort you to your preoperative room.   · You may have cloth compression devices placed on your legs. These help to prevent blood clots and reduce swelling in your legs.  · An IV may be inserted into one of your veins.  · In the operating room, you may be given one or more of the following:  ? A medicine to help you relax (sedative).  ? A medicine to numb the area (local anesthetic).  ? A medicine to make you  "fall asleep (general anesthetic).  ? A medicine that is injected into an area of your body to numb everything below the injection site (regional anesthetic).  · Your surgical site will be marked or identified.  · You may be given an antibiotic through your IV to help prevent infection.  Contact a health care provider if you:  · Develop a fever of more than 100.4°F (38°C) or other feelings of illness during the 48 hours before your surgery.  · Have symptoms that get worse.  Have questions or concerns about your surgery    General Anesthesia/Surgery, Adult  General anesthesia is the use of medicines to make a person \"go to sleep\" (unconscious) for a medical procedure. General anesthesia must be used for certain procedures, and is often recommended for procedures that:  · Last a long time.  · Require you to be still or in an unusual position.  · Are major and can cause blood loss.  The medicines used for general anesthesia are called general anesthetics. As well as making you unconscious for a certain amount of time, these medicines:  · Prevent pain.  · Control your blood pressure.  · Relax your muscles.  Tell a health care provider about:  · Any allergies you have.  · All medicines you are taking, including vitamins, herbs, eye drops, creams, and over-the-counter medicines.  · Any problems you or family members have had with anesthetic medicines.  · Types of anesthetics you have had in the past.  · Any blood disorders you have.  · Any surgeries you have had.  · Any medical conditions you have.  · Any recent upper respiratory, chest, or ear infections.  · Any history of:  ? Heart or lung conditions, such as heart failure, sleep apnea, asthma, or chronic obstructive pulmonary disease (COPD).  ?  service.  ? Depression or anxiety.  · Any tobacco or drug use, including marijuana or alcohol use.  · Whether you are pregnant or may be pregnant.  What are the risks?  Generally, this is a safe procedure. However, " problems may occur, including:  · Allergic reaction.  · Lung and heart problems.  · Inhaling food or liquid from the stomach into the lungs (aspiration).  · Nerve injury.  · Air in the bloodstream, which can lead to stroke.  · Extreme agitation or confusion (delirium) when you wake up from the anesthetic.  · Waking up during your procedure and being unable to move. This is rare.  These problems are more likely to develop if you are having a major surgery or if you have an advanced or serious medical condition. You can prevent some of these complications by answering all of your health care provider's questions thoroughly and by following all instructions before your procedure.  General anesthesia can cause side effects, including:  · Nausea or vomiting.  · A sore throat from the breathing tube.  · Hoarseness.  · Wheezing or coughing.  · Shaking chills.  · Tiredness.  · Body aches.  · Anxiety.  · Sleepiness or drowsiness.  · Confusion or agitation.  RISKS AND COMPLICATIONS OF SURGERY  Your health care provider will discuss possible risks and complications with you before surgery. Common risks and complications include:    · Problems due to the use of anesthetics.  · Blood loss and replacement (does not apply to minor surgical procedures).  · Temporary increase in pain due to surgery.  · Uncorrected pain or problems that the surgery was meant to correct.  · Infection.  · New damage.    What happens before the procedure?    Medicines  Ask your health care provider about:  · Changing or stopping your regular medicines. This is especially important if you are taking diabetes medicines or blood thinners.  · Taking medicines such as aspirin and ibuprofen. These medicines can thin your blood. Do not take these medicines unless your health care provider tells you to take them.  · Taking over-the-counter medicines, vitamins, herbs, and supplements. Do not take these during the week before your procedure unless your health  care provider approves them.  General instructions  · Starting 3-6 weeks before the procedure, do not use any products that contain nicotine or tobacco, such as cigarettes and e-cigarettes. If you need help quitting, ask your health care provider.  · If you brush your teeth on the morning of the procedure, make sure to spit out all of the toothpaste.  · Tell your health care provider if you become ill or develop a cold, cough, or fever.  · If instructed by your health care provider, bring your sleep apnea device with you on the day of your surgery (if applicable).  · Ask your health care provider if you will be going home the same day, the following day, or after a longer hospital stay.  ? Plan to have someone take you home from the hospital or clinic.  ? Plan to have a responsible adult care for you for at least 24 hours after you leave the hospital or clinic. This is important.  What happens during the procedure?  · You will be given anesthetics through both of the following:  ? A mask placed over your nose and mouth.  ? An IV in one of your veins.  · You may receive a medicine to help you relax (sedative).  · After you are unconscious, a breathing tube may be inserted down your throat to help you breathe. This will be removed before you wake up.  · An anesthesia specialist will stay with you throughout your procedure. He or she will:  ? Keep you comfortable and safe by continuing to give you medicines and adjusting the amount of medicine that you get.  ? Monitor your blood pressure, pulse, and oxygen levels to make sure that the anesthetics do not cause any problems.  The procedure may vary among health care providers and hospitals.  What happens after the procedure?  · Your blood pressure, temperature, heart rate, breathing rate, and blood oxygen level will be monitored until the medicines you were given have worn off.  · You will wake up in a recovery area. You may wake up slowly.  · If you feel anxious or  agitated, you may be given medicine to help you calm down.  · If you will be going home the same day, your health care provider may check to make sure you can walk, drink, and urinate.  · Your health care provider will treat any pain or side effects you have before you go home.  · Do not drive for 24 hours if you were given a sedative.  Summary  · General anesthesia is used to keep you still and prevent pain during a procedure.  · It is important to tell your healthcare provider about your medical history and any surgeries you have had, and previous experience with anesthesia.  · Follow your healthcare provider’s instructions about when to stop eating, drinking, or taking certain medicines before your procedure.  · Plan to have someone take you home from the hospital or clinic.  This information is not intended to replace advice given to you by your health care provider. Make sure you discuss any questions you have with your health care provider.  Document Released: 03/26/2009 Document Revised: 08/03/2018 Document Reviewed: 08/03/2018  [employer] Interactive Patient Education © 2019 [employer]       Fall Prevention in Hospitals, Adult  As a hospital patient, your condition and the treatments you receive can increase your risk for falls. Some additional risk factors for falls in a hospital include:  · Being in an unfamiliar environment.  · Being on bed rest.  · Your surgery.  · Taking certain medicines.  · Your tubing requirements, such as intravenous (IV) therapy or catheters.  It is important that you learn how to decrease fall risks while at the hospital. Below are important tips that can help prevent falls.  SAFETY TIPS FOR PREVENTING FALLS  Talk about your risk of falling.  · Ask your health care provider why you are at risk for falling. Is it your medicine, illness, tubing placement, or something else?  · Make a plan with your health care provider to keep you safe from falls.  · Ask your health care provider  or pharmacist about side effects of your medicines. Some medicines can make you dizzy or affect your coordination.  Ask for help.  · Ask for help before getting out of bed. You may need to press your call button.  · Ask for assistance in getting safely to the toilet.  · Ask for a walker or cane to be put at your bedside. Ask that most of the side rails on your bed be placed up before your health care provider leaves the room.  · Ask family or friends to sit with you.  · Ask for things that are out of your reach, such as your glasses, hearing aids, telephone, bedside table, or call button.  Follow these tips to avoid falling:  · Stay lying or seated, rather than standing, while waiting for help.  · Wear rubber-soled slippers or shoes whenever you walk in the hospital.  · Avoid quick, sudden movements.  ¨ Change positions slowly.  ¨ Sit on the side of your bed before standing.  ¨ Stand up slowly and wait before you start to walk.  · Let your health care provider know if there is a spill on the floor.  · Pay careful attention to the medical equipment, electrical cords, and tubes around you.  · When you need help, use your call button by your bed or in the bathroom. Wait for one of your health care providers to help you.  · If you feel dizzy or unsure of your footing, return to bed and wait for assistance.  · Avoid being distracted by the TV, telephone, or another person in your room.  · Do not lean or support yourself on rolling objects, such as IV poles or bedside tables.     This information is not intended to replace advice given to you by your health care provider. Make sure you discuss any questions you have with your health care provider.     Document Released: 12/15/2001 Document Revised: 01/08/2016 Document Reviewed: 08/25/2013  [employer] Interactive Patient Education ©2016 [employer]       [facility]  CHG 4% Patient Instruction Sheet    Chlorhexidine Before Surgery  Chlorhexidine gluconate (CHG)  is a germ-killing (antiseptic) solution that is used to clean the skin. It gets rid of the bacteria that normally live on the skin. Cleaning your skin with CHG before surgery helps lower the risk for infection after surgery.    How to use CHG solution  · You will take 2 showers, one shower the night before surgery, the second shower the morning of surgery before coming to the hospital.  · Use CHG only as told by your health care provider, and follow the instructions on the label.  · Use CHG solution while taking a shower. Follow these steps when using CHG solution (unless your health care provider gives you different instructions):  1. Start the shower.  2. Use your normal soap and shampoo to wash your face and hair.  3. Turn off the shower or move out of the shower stream.  4. Pour the CHG onto a clean washcloth. Do not use any type of brush or rough-edged sponge.  5. Starting at your neck, lather your body down to your toes. Make sure you:  6. Pay special attention to the part of your body where you will be having surgery. Scrub this area for at least 1 minute.  7. Use the full amount of CHG as directed. Usually, this is one half bottle for each shower.  8. Do not use CHG on your head or face. If the solution gets into your ears or eyes, rinse them well with water.  9. Avoid your genital area.  10. Avoid any areas of skin that have broken skin, cuts, or scrapes.  11. Scrub your back and under your arms. Make sure to wash skin folds.  12. Let the lather sit on your skin for 1-2 minutes or as long as told by your health care  provider.  13. Thoroughly rinse your entire body in the shower. Make sure that all body creases and crevices are rinsed well.  14. Dry off with a clean towel. Do not put any substances on your body afterward, such as powder, lotion, or perfume.  15. Put on clean clothes or pajamas.  16. If it is the night before your surgery, sleep in clean sheets.    What are the risks?  Risks of using CHG  include:  · A skin reaction.  · Hearing loss, if CHG gets in your ears.  · Eye injury, if CHG gets in your eyes and is not rinsed out.  · The CHG product catching fire.  Make sure that you avoid smoking and flames after applying CHG to your skin.  Do not use CHG:  · If you have a chlorhexidine allergy or have previously reacted to chlorhexidine.  · On babies younger than 2 months of age.      On the day of surgery, when you are taken to your room in Outpatient Surgery you will be given a CHG prepackaged cloth to wipe the site for your surgery.  How to use CHG prepackaged cloths  · Follow the instructions on the label.  · Use the CHG cloth on clean, dry skin. Follow these steps when using a CHG cloth (unless your health care provider gives you different instructions):  1. Using the CHG cloth, vigorously scrub the part of your body where you will be having surgery. Scrub using a back-and-forth motion for 3 minutes. The area on your body should be completely wet with CHG when you are finished scrubbing.  2. Do not rinse. Discard the cloth and let the area air-dry for 1 minute. Do not put any substances on your body afterward, such as powder, lotion, or perfume.  Contact a health care provider if:  · Your skin gets irritated after scrubbing.  · You have questions about using your solution or cloth.  Get help right away if:  · Your eyes become very red or swollen.  · Your eyes itch badly.  · Your skin itches badly and is red or swollen.  · Your hearing changes.  · You have trouble seeing.  · You have swelling or tingling in your mouth or throat.  · You have trouble breathing.  · You swallow any chlorhexidine.  Summary  · Chlorhexidine gluconate (CHG) is a germ-killing (antiseptic) solution that is used to clean the skin. Cleaning your skin with CHG before surgery helps lower the risk for infection after surgery.  · You may be given CHG to use at home. It may be in a bottle or in a prepackaged cloth to use on your skin.  Carefully follow your health care provider's instructions and the instructions on the product label.  · Do not use CHG if you have a chlorhexidine allergy.  · Contact your health care provider if your skin gets irritated after scrubbing.  This information is not intended to replace advice given to you by your health care provider. Make sure you discuss any questions you have with your health care provider.  Document Released: 09/11/2013 Document Revised: 11/15/2018 Document Reviewed: 11/15/2018  [employer] Interactive Patient Education © 2019 [employer]          PATIENT/FAMILY/RESPONSIBLE PARTY VERBALIZES UNDERSTANDING OF ABOVE EDUCATION.  COPY OF PAIN SCALE GIVEN AND REVIEWED WITH VERBALIZED UNDERSTANDING.

## 2021-06-24 LAB
QT INTERVAL: 376 MS
QTC INTERVAL: 457 MS

## 2021-06-29 ENCOUNTER — TELEPHONE (OUTPATIENT)
Dept: NEUROSURGERY | Age: 36
End: 2021-06-29

## 2021-06-29 ENCOUNTER — OFFICE VISIT (OUTPATIENT)
Dept: NEUROSURGERY | Age: 36
End: 2021-06-29
Payer: MEDICAID

## 2021-06-29 VITALS
HEIGHT: 64 IN | DIASTOLIC BLOOD PRESSURE: 84 MMHG | OXYGEN SATURATION: 97 % | HEART RATE: 115 BPM | BODY MASS INDEX: 37.73 KG/M2 | SYSTOLIC BLOOD PRESSURE: 117 MMHG | WEIGHT: 221 LBS

## 2021-06-29 DIAGNOSIS — M79.605 LEFT LEG PAIN: ICD-10-CM

## 2021-06-29 DIAGNOSIS — M21.372 LEFT FOOT DROP: ICD-10-CM

## 2021-06-29 DIAGNOSIS — R26.89 ANTALGIC GAIT: ICD-10-CM

## 2021-06-29 DIAGNOSIS — M51.26 RECURRENT HERNIATION OF LUMBAR DISC: Primary | ICD-10-CM

## 2021-06-29 DIAGNOSIS — M51.36 DDD (DEGENERATIVE DISC DISEASE), LUMBAR: ICD-10-CM

## 2021-06-29 PROCEDURE — 99205 OFFICE O/P NEW HI 60 MIN: CPT | Performed by: NEUROLOGICAL SURGERY

## 2021-06-29 RX ORDER — SODIUM CHLORIDE 9 MG/ML
25 INJECTION, SOLUTION INTRAVENOUS PRN
Status: CANCELLED | OUTPATIENT
Start: 2021-06-29

## 2021-06-29 RX ORDER — HYDROCODONE BITARTRATE AND ACETAMINOPHEN 10; 325 MG/1; MG/1
1 TABLET ORAL EVERY 4 HOURS PRN
Status: ON HOLD | COMMUNITY
Start: 2020-10-16 | End: 2021-07-09 | Stop reason: HOSPADM

## 2021-06-29 RX ORDER — ERGOCALCIFEROL 1.25 MG/1
50000 CAPSULE ORAL WEEKLY
COMMUNITY
Start: 2021-06-22

## 2021-06-29 RX ORDER — PSEUDOEPHEDRINE HCL 30 MG
100 TABLET ORAL DAILY
COMMUNITY

## 2021-06-29 RX ORDER — CYCLOBENZAPRINE HCL 10 MG
10 TABLET ORAL 3 TIMES DAILY
COMMUNITY
Start: 2020-08-26

## 2021-06-29 RX ORDER — SODIUM CHLORIDE 0.9 % (FLUSH) 0.9 %
10 SYRINGE (ML) INJECTION EVERY 12 HOURS SCHEDULED
Status: CANCELLED | OUTPATIENT
Start: 2021-06-29

## 2021-06-29 RX ORDER — DULOXETIN HYDROCHLORIDE 30 MG/1
60 CAPSULE, DELAYED RELEASE ORAL DAILY
COMMUNITY
Start: 2021-05-15

## 2021-06-29 RX ORDER — GABAPENTIN 600 MG/1
900 TABLET ORAL 3 TIMES DAILY
COMMUNITY
Start: 2021-06-01

## 2021-06-29 RX ORDER — DICLOFENAC SODIUM 75 MG/1
75 TABLET, DELAYED RELEASE ORAL 2 TIMES DAILY
COMMUNITY
Start: 2021-05-22

## 2021-06-29 RX ORDER — AMITRIPTYLINE HYDROCHLORIDE 100 MG/1
100 TABLET, FILM COATED ORAL NIGHTLY
COMMUNITY

## 2021-06-29 RX ORDER — SODIUM CHLORIDE 0.9 % (FLUSH) 0.9 %
10 SYRINGE (ML) INJECTION PRN
Status: CANCELLED | OUTPATIENT
Start: 2021-06-29

## 2021-06-29 ASSESSMENT — ENCOUNTER SYMPTOMS
GASTROINTESTINAL NEGATIVE: 1
EYES NEGATIVE: 1
RESPIRATORY NEGATIVE: 1

## 2021-06-29 NOTE — H&P
[facility]  H&P Visit      Chief Complaint   Patient presents with    Consultation    Leg Pain    Surgery       HISTORY OF PRESENT ILLNESS:    Boni Marquis is a 43 y.o. female who underwent at left L5-S1 microdiscecomy on 10/16/0220 for a left disc herniation per Dr. Arron Driver. Prior to surgery she complained of left leg pain that radiated in a S1 fashion and some left sided foot weakness. Following surgery she states she did not dramatically improve. Today she presents with left leg pain that radiates into the left buttock, posterior thigh, and posterolateral leg. Her pain is mostly located in the LLE. The patient complains of numbness of the left leg in the same distribution. She states she saw Dr. Arron Driver following her repeat MRI, explained the recurrent disc herniation and was recommending a fusion at that level. The patient has underwent a non-operative treatment course that has included:  NSAIDs (diclofenac)  Cymbalta  Muscle Relaxers (flexeril)  Gabapentin  Opiates (Norco)  Oral Steroids  Physical Therapy   Epidural Steroid Injections  Massage Therapy  Chiropractic Manipulation    Of note she does not use tobacco and does not take blood thinning medications. Past Medical History:   Diagnosis Date    Headache     Neuropathy        Past Surgical History:   Procedure Laterality Date    APPENDECTOMY      LUMBAR DISCECTOMY      L5-S1       Current Outpatient Medications   Medication Sig Dispense Refill    HYDROcodone-acetaminophen (NORCO)  MG per tablet Take 1 tablet by mouth every 4 hours as needed.  gabapentin (NEURONTIN) 600 MG tablet Take 900 mg by mouth 3 times daily.       vitamin D (ERGOCALCIFEROL) 1.25 MG (31356 UT) CAPS capsule Take 50,000 Units by mouth once a week      DULoxetine (CYMBALTA) 30 MG extended release capsule Take 30 mg by mouth daily      docusate (COLACE, DULCOLAX) 100 MG CAPS Take 100 mg by mouth daily      cyclobenzaprine (FLEXERIL) 10 MG tablet Take 10 mg by mouth 3 times daily      diclofenac (VOLTAREN) 75 MG EC tablet Take 75 mg by mouth 2 times daily      calcium carbonate 1500 (600 Ca) MG TABS tablet Take 600 mg by mouth daily      amitriptyline (ELAVIL) 100 MG tablet Take 100 mg by mouth nightly       No current facility-administered medications for this visit. Allergies:  Latex, Doxycycline, and Other    Social History:   Social History     Tobacco Use   Smoking Status Never Smoker   Smokeless Tobacco Never Used     Social History     Substance and Sexual Activity   Alcohol Use Never         Family History:   History reviewed. No pertinent family history. REVIEW OF SYSTEMS:  Constitutional: Negative. HENT: Negative. Eyes: Negative. Respiratory: Negative. Cardiovascular: Negative. Gastrointestinal: Negative. Genitourinary: Negative. Musculoskeletal: Positive for myalgias. Skin: Negative. Neurological: Positive for tingling and weakness. Endo/Heme/Allergies: Negative. Psychiatric/Behavioral: Negative. PHYSICAL EXAM:  Vitals:    06/29/21 1106   BP: 117/84   Pulse: 115   SpO2: 97%     Constitutional: appears well-developed and well-nourished. Eyes  conjunctiva normal.  Pupils react to light  Ear, nose, throat - hearing intact to finger rub, No scars, masses, or lesions over external nose or ears, no atrophy oftongue  Neck- symmetric, no masses noted, no jugular vein distension  Respiration- chest wall appears symmetric, good expansion, normal effort without use of accessory muscles  Musculoskeletal  no significant wasting of muscles noted, no bony deformities, gait no gross ataxia  Extremities- no clubbing, cyanosis oredema  Skin  warm, dry, and intact. No rash, erythema, or pallor.   Psychiatric  mood, affect, and behavior appear normal.     Neurologic Examination  Awake, Alert and oriented x 4  Normal speech pattern, following commands    Motor:  RIGHT:     iliopsoas 5/5 knee flexor 5/5    knee extension 5/5    EHL/dorsiflexion 5/5    plantar flexion 5/5    LEFT:      iliopsoas 5/5    knee flexor 5/5    knee extension 5/5    EHL 2/5; dorsiflexion 4+/5    plantar flexion 5/5    Decrease to pinprick sensation LEFT lateral leg and foot  Reflexes: absent LEFT achilles  No myofacial tenderness to palpation  Walks with a garcia; wearing LEFT AFO brace         DATA and IMAGING:    Nursing/pcp notes, imaging, labs, and vitals reviewed. PT,OT and/or speech notes reviewed    No results found for: WBC, HGB, HCT, MCV, PLTNo results found for: NA, K, CL, CO2, BUN, CREATININE, GLUCOSE, CALCIUM, PROT, LABALBU, BILITOT, ALKPHOS, AST, ALT, LABGLOM, GFRAA, AGRATIO, GLOBNo results found for: INR, PROTIME    MRI Lumbar spine, 5/10/2021 Zaria Lau  I have personally reviewed these images and my interpretation is: There is evidence of a recurrent disc extrusion at L5-S1 that migrates superiorly behind the vertebral body and results in compression of the left S1 nerve root. There is mild to moderate foraminal stenosis. There is no contrast noted on the new MRI. ASSESSMENT:    Boni Marquis is a 43 y.o. female that has undergone a previous microdiscectomy on the LEFT of L5-S1 per Dr. Arron Driver with a recurrent disc herniation and complaints of a LEFT S1 radiculopathy. ICD-10-CM    1. Recurrent herniation of lumbar disc  M51.26    2. DDD (degenerative disc disease), lumbar  M51.36    3. Left leg pain  M79.605    4. Left foot drop  M21.372    5. Antalgic gait  R26.89        PLAN:  We have discussed and reviewed the results of the repeat MRI lumbar spine with Mrs. Corrales at length. We explained that she does have a recurrent disc herniation on the left of L5-S1 that is the culprit of her left leg pain. We discussed that we would recommend an additional microdiscectomy.   We did explain that given her left foot weakness has been present for so long that she is less likely to dramatically regain the function. Surgery would be performed to alleviate the leg pain.      -She will need a LEFT L5-S1 microdiscectomy for recurrent disc herniation using minimally invasive technique     We discussed risks, complications and expectations, including but not limited to infection, paralysis, bowel and bladder dysfunction, need for revision procedure, persistent pain, spinal fluid leak, stroke and death. In addition, the benefits of the surgery were thoroughly discussed and the patient demonstrated a deep understanding. The patient wishes to proceed with surgical intervention. The patient was counseled at length about the risks of marcella Covid-19 during their perioperative period and any recovery window from their procedure. The patient was made aware that marcella Covid-19  may worsen their prognosis for recovering from their procedure  and lend to a higher morbidity and/or mortality risk. All material risks, benefits, and reasonable alternatives including postponing the procedure were discussed. The patient does wish to proceed with the procedure at this time.         Jaime Alston, DO

## 2021-06-29 NOTE — H&P (VIEW-ONLY)
[facility]  H&P Visit      Chief Complaint   Patient presents with    Consultation    Leg Pain    Surgery       HISTORY OF PRESENT ILLNESS:    Frankie Garcia is a 43 y.o. female who underwent at left L5-S1 microdiscecomy on 10/16/0220 for a left disc herniation per Dr. Margo Junior. Prior to surgery she complained of left leg pain that radiated in a S1 fashion and some left sided foot weakness. Following surgery she states she did not dramatically improve. Today she presents with left leg pain that radiates into the left buttock, posterior thigh, and posterolateral leg. Her pain is mostly located in the LLE. The patient complains of numbness of the left leg in the same distribution. She states she saw Dr. Margo Junior following her repeat MRI, explained the recurrent disc herniation and was recommending a fusion at that level. The patient has underwent a non-operative treatment course that has included:  NSAIDs (diclofenac)  Cymbalta  Muscle Relaxers (flexeril)  Gabapentin  Opiates (Norco)  Oral Steroids  Physical Therapy   Epidural Steroid Injections  Massage Therapy  Chiropractic Manipulation    Of note she does not use tobacco and does not take blood thinning medications. Past Medical History:   Diagnosis Date    Headache     Neuropathy        Past Surgical History:   Procedure Laterality Date    APPENDECTOMY      LUMBAR DISCECTOMY      L5-S1       Current Outpatient Medications   Medication Sig Dispense Refill    HYDROcodone-acetaminophen (NORCO)  MG per tablet Take 1 tablet by mouth every 4 hours as needed.  gabapentin (NEURONTIN) 600 MG tablet Take 900 mg by mouth 3 times daily.       vitamin D (ERGOCALCIFEROL) 1.25 MG (75230 UT) CAPS capsule Take 50,000 Units by mouth once a week      DULoxetine (CYMBALTA) 30 MG extended release capsule Take 30 mg by mouth daily      docusate (COLACE, DULCOLAX) 100 MG CAPS Take 100 mg by mouth daily      cyclobenzaprine (FLEXERIL) 10 MG tablet Take 10 mg by mouth 3 times daily      diclofenac (VOLTAREN) 75 MG EC tablet Take 75 mg by mouth 2 times daily      calcium carbonate 1500 (600 Ca) MG TABS tablet Take 600 mg by mouth daily      amitriptyline (ELAVIL) 100 MG tablet Take 100 mg by mouth nightly       No current facility-administered medications for this visit. Allergies:  Latex, Doxycycline, and Other    Social History:   Social History     Tobacco Use   Smoking Status Never Smoker   Smokeless Tobacco Never Used     Social History     Substance and Sexual Activity   Alcohol Use Never         Family History:   History reviewed. No pertinent family history. REVIEW OF SYSTEMS:  Constitutional: Negative. HENT: Negative. Eyes: Negative. Respiratory: Negative. Cardiovascular: Negative. Gastrointestinal: Negative. Genitourinary: Negative. Musculoskeletal: Positive for myalgias. Skin: Negative. Neurological: Positive for tingling and weakness. Endo/Heme/Allergies: Negative. Psychiatric/Behavioral: Negative. PHYSICAL EXAM:  Vitals:    06/29/21 1106   BP: 117/84   Pulse: 115   SpO2: 97%     Constitutional: appears well-developed and well-nourished. Eyes  conjunctiva normal.  Pupils react to light  Ear, nose, throat - hearing intact to finger rub, No scars, masses, or lesions over external nose or ears, no atrophy oftongue  Neck- symmetric, no masses noted, no jugular vein distension  Respiration- chest wall appears symmetric, good expansion, normal effort without use of accessory muscles  Musculoskeletal  no significant wasting of muscles noted, no bony deformities, gait no gross ataxia  Extremities- no clubbing, cyanosis oredema  Skin  warm, dry, and intact. No rash, erythema, or pallor.   Psychiatric  mood, affect, and behavior appear normal.     Neurologic Examination  Awake, Alert and oriented x 4  Normal speech pattern, following commands    Motor:  RIGHT:     iliopsoas 5/5 knee flexor 5/5    knee extension 5/5    EHL/dorsiflexion 5/5    plantar flexion 5/5    LEFT:      iliopsoas 5/5    knee flexor 5/5    knee extension 5/5    EHL 2/5; dorsiflexion 4+/5    plantar flexion 5/5    Decrease to pinprick sensation LEFT lateral leg and foot  Reflexes: absent LEFT achilles  No myofacial tenderness to palpation  Walks with a garcia; wearing LEFT AFO brace         DATA and IMAGING:    Nursing/pcp notes, imaging, labs, and vitals reviewed. PT,OT and/or speech notes reviewed    No results found for: WBC, HGB, HCT, MCV, PLTNo results found for: NA, K, CL, CO2, BUN, CREATININE, GLUCOSE, CALCIUM, PROT, LABALBU, BILITOT, ALKPHOS, AST, ALT, LABGLOM, GFRAA, AGRATIO, GLOBNo results found for: INR, PROTIME    MRI Lumbar spine, 5/10/2021 Loami Pina  I have personally reviewed these images and my interpretation is: There is evidence of a recurrent disc extrusion at L5-S1 that migrates superiorly behind the vertebral body and results in compression of the left S1 nerve root. There is mild to moderate foraminal stenosis. There is no contrast noted on the new MRI. ASSESSMENT:    Naida Davis is a 43 y.o. female that has undergone a previous microdiscectomy on the LEFT of L5-S1 per Dr. Joseph Wallace with a recurrent disc herniation and complaints of a LEFT S1 radiculopathy. ICD-10-CM    1. Recurrent herniation of lumbar disc  M51.26    2. DDD (degenerative disc disease), lumbar  M51.36    3. Left leg pain  M79.605    4. Left foot drop  M21.372    5. Antalgic gait  R26.89        PLAN:  We have discussed and reviewed the results of the repeat MRI lumbar spine with Mrs. Corrales at length. We explained that she does have a recurrent disc herniation on the left of L5-S1 that is the culprit of her left leg pain. We discussed that we would recommend an additional microdiscectomy.   We did explain that given her left foot weakness has been present for so long that she is less likely to dramatically regain the function. Surgery would be performed to alleviate the leg pain.      -She will need a LEFT L5-S1 microdiscectomy for recurrent disc herniation using minimally invasive technique     We discussed risks, complications and expectations, including but not limited to infection, paralysis, bowel and bladder dysfunction, need for revision procedure, persistent pain, spinal fluid leak, stroke and death. In addition, the benefits of the surgery were thoroughly discussed and the patient demonstrated a deep understanding. The patient wishes to proceed with surgical intervention. The patient was counseled at length about the risks of marcella Covid-19 during their perioperative period and any recovery window from their procedure. The patient was made aware that marcella Covid-19  may worsen their prognosis for recovering from their procedure  and lend to a higher morbidity and/or mortality risk. All material risks, benefits, and reasonable alternatives including postponing the procedure were discussed. The patient does wish to proceed with the procedure at this time.         Unique Lies, DO

## 2021-06-29 NOTE — PROGRESS NOTES
[facility]  Office Visit      Chief Complaint   Patient presents with    Consultation    Leg Pain    Surgery       HISTORY OF PRESENT ILLNESS:    Beck Bingham is a 43 y.o. female who underwent at left L5-S1 microdiscecomy on 10/16/0220 for a left disc herniation per Dr. Michele Crystal. Prior to surgery she complained of left leg pain that radiated in a S1 fashion and some left sided foot weakness. Following surgery she states she did not dramatically improve. Today she presents with left leg pain that radiates into the left buttock, posterior thigh, and posterolateral leg. Her pain is mostly located in the LLE. The patient complains of numbness of the left leg in the same distribution. She states she saw Dr. Michele Crystal following her repeat MRI, explained the recurrent disc herniation and was recommending a fusion at that level. The patient has underwent a non-operative treatment course that has included:  NSAIDs (diclofenac)  Cymbalta  Muscle Relaxers (flexeril)  Gabapentin  Opiates (Norco)  Oral Steroids  Physical Therapy   Epidural Steroid Injections  Massage Therapy  Chiropractic Manipulation    Of note she does not use tobacco and does not take blood thinning medications. Past Medical History:   Diagnosis Date    Headache     Neuropathy        Past Surgical History:   Procedure Laterality Date    APPENDECTOMY      LUMBAR DISCECTOMY      L5-S1       Current Outpatient Medications   Medication Sig Dispense Refill    HYDROcodone-acetaminophen (NORCO)  MG per tablet Take 1 tablet by mouth every 4 hours as needed.  gabapentin (NEURONTIN) 600 MG tablet Take 900 mg by mouth 3 times daily.       vitamin D (ERGOCALCIFEROL) 1.25 MG (14570 UT) CAPS capsule Take 50,000 Units by mouth once a week      DULoxetine (CYMBALTA) 30 MG extended release capsule Take 30 mg by mouth daily      docusate (COLACE, DULCOLAX) 100 MG CAPS Take 100 mg by mouth daily      cyclobenzaprine (FLEXERIL) 10 MG tablet Take 10 mg by mouth 3 times daily      diclofenac (VOLTAREN) 75 MG EC tablet Take 75 mg by mouth 2 times daily      calcium carbonate 1500 (600 Ca) MG TABS tablet Take 600 mg by mouth daily      amitriptyline (ELAVIL) 100 MG tablet Take 100 mg by mouth nightly       No current facility-administered medications for this visit. Allergies:  Latex, Doxycycline, and Other    Social History:   Social History     Tobacco Use   Smoking Status Never Smoker   Smokeless Tobacco Never Used     Social History     Substance and Sexual Activity   Alcohol Use Never         Family History:   History reviewed. No pertinent family history. REVIEW OF SYSTEMS:  Constitutional: Negative. HENT: Negative. Eyes: Negative. Respiratory: Negative. Cardiovascular: Negative. Gastrointestinal: Negative. Genitourinary: Negative. Musculoskeletal: Positive for myalgias. Skin: Negative. Neurological: Positive for tingling and weakness. Endo/Heme/Allergies: Negative. Psychiatric/Behavioral: Negative. PHYSICAL EXAM:  Vitals:    06/29/21 1106   BP: 117/84   Pulse: 115   SpO2: 97%     Constitutional: appears well-developed and well-nourished. Eyes  conjunctiva normal.  Pupils react to light  Ear, nose, throat - hearing intact to finger rub, No scars, masses, or lesions over external nose or ears, no atrophy oftongue  Neck- symmetric, no masses noted, no jugular vein distension  Respiration- chest wall appears symmetric, good expansion, normal effort without use of accessory muscles  Musculoskeletal  no significant wasting of muscles noted, no bony deformities, gait no gross ataxia  Extremities- no clubbing, cyanosis oredema  Skin  warm, dry, and intact. No rash, erythema, or pallor.   Psychiatric  mood, affect, and behavior appear normal.     Neurologic Examination  Awake, Alert and oriented x 4  Normal speech pattern, following commands    Motor:  RIGHT: iliopsoas 5/5    knee flexor 5/5    knee extension 5/5    EHL/dorsiflexion 5/5    plantar flexion 5/5    LEFT:      iliopsoas 5/5    knee flexor 5/5    knee extension 5/5    EHL 2/5; dorsiflexion 4+/5    plantar flexion 5/5    Decrease to pinprick sensation LEFT lateral leg and foot  Reflexes: absent LEFT achilles  No myofacial tenderness to palpation  Walks with a garcia; wearing LEFT AFO brace         DATA and IMAGING:    Nursing/pcp notes, imaging, labs, and vitals reviewed. PT,OT and/or speech notes reviewed    No results found for: WBC, HGB, HCT, MCV, PLTNo results found for: NA, K, CL, CO2, BUN, CREATININE, GLUCOSE, CALCIUM, PROT, LABALBU, BILITOT, ALKPHOS, AST, ALT, LABGLOM, GFRAA, AGRATIO, GLOBNo results found for: INR, PROTIME    MRI Lumbar spine, 5/10/2021 Melany Helton  I have personally reviewed these images and my interpretation is: There is evidence of a recurrent disc extrusion at L5-S1 that migrates superiorly behind the vertebral body and results in compression of the left S1 nerve root. There is mild to moderate foraminal stenosis. There is no contrast noted on the new MRI. ASSESSMENT:    Santosh Graham is a 43 y.o. female that has undergone a previous microdiscectomy on the LEFT of L5-S1 per Dr. Kendra Sanchez with a recurrent disc herniation and complaints of a LEFT S1 radiculopathy. ICD-10-CM    1. Recurrent herniation of lumbar disc  M51.26    2. DDD (degenerative disc disease), lumbar  M51.36    3. Left leg pain  M79.605    4. Left foot drop  M21.372    5. Antalgic gait  R26.89        PLAN:  We have discussed and reviewed the results of the repeat MRI lumbar spine with Mrs. Corrales at length. We explained that she does have a recurrent disc herniation on the left of L5-S1 that is the culprit of her left leg pain. We discussed that we would recommend an additional microdiscectomy.   We did explain that given her left foot weakness has been present for so long that she is less likely to dramatically regain the function. Surgery would be performed to alleviate the leg pain.      -She will need a LEFT L5-S1 microdiscectomy for recurrent disc herniation using minimally invasive technique     We discussed risks, complications and expectations, including but not limited to infection, paralysis, bowel and bladder dysfunction, need for revision procedure, persistent pain, spinal fluid leak, stroke and death. In addition, the benefits of the surgery were thoroughly discussed and the patient demonstrated a deep understanding. The patient wishes to proceed with surgical intervention. We will schedule for surgery in the near future. The patient was counseled at length about the risks of marcella Covid-19 during their perioperative period and any recovery window from their procedure. The patient was made aware that marcella Covid-19  may worsen their prognosis for recovering from their procedure  and lend to a higher morbidity and/or mortality risk. All material risks, benefits, and reasonable alternatives including postponing the procedure were discussed. The patient does wish to proceed with the procedure at this time.         Kevin Rawls, DO

## 2021-06-29 NOTE — PROGRESS NOTES
Review of Systems   Constitutional: Negative. HENT: Negative. Eyes: Negative. Respiratory: Negative. Cardiovascular: Negative. Gastrointestinal: Negative. Genitourinary: Negative. Musculoskeletal: Positive for myalgias. Skin: Negative. Neurological: Positive for tingling and weakness. Endo/Heme/Allergies: Negative. Psychiatric/Behavioral: Negative.

## 2021-06-29 NOTE — TELEPHONE ENCOUNTER
Called [facility] and spoke with John. He voiced that he would get this patients MRI pushed to [facility] today.

## 2021-07-06 RX ORDER — HYDROCODONE BITARTRATE AND ACETAMINOPHEN 10; 325 MG/1; MG/1
TABLET ORAL
Qty: 120 TABLET | Refills: 0 | OUTPATIENT
Start: 2021-07-06

## 2021-07-07 ENCOUNTER — TELEPHONE (OUTPATIENT)
Dept: NEUROSURGERY | Age: 36
End: 2021-07-07

## 2021-07-07 ENCOUNTER — HOSPITAL ENCOUNTER (OUTPATIENT)
Dept: PREADMISSION TESTING | Age: 36
Discharge: HOME | End: 2021-07-11
Payer: MEDICAID

## 2021-07-07 VITALS — HEIGHT: 64 IN | WEIGHT: 215 LBS | BODY MASS INDEX: 36.7 KG/M2

## 2021-07-07 LAB
ABO/RH: NORMAL
ANTIBODY SCREEN: NORMAL
SARS-COV-2, PCR: NOT DETECTED

## 2021-07-07 PROCEDURE — U0005 INFEC AGEN DETEC AMPLI PROBE: HCPCS

## 2021-07-07 PROCEDURE — 86900 BLOOD TYPING SEROLOGIC ABO: CPT

## 2021-07-07 PROCEDURE — U0003 INFECTIOUS AGENT DETECTION BY NUCLEIC ACID (DNA OR RNA); SEVERE ACUTE RESPIRATORY SYNDROME CORONAVIRUS 2 (SARS-COV-2) (CORONAVIRUS DISEASE [COVID-19]), AMPLIFIED PROBE TECHNIQUE, MAKING USE OF HIGH THROUGHPUT TECHNOLOGIES AS DESCRIBED BY CMS-2020-01-R: HCPCS

## 2021-07-07 PROCEDURE — 86901 BLOOD TYPING SEROLOGIC RH(D): CPT

## 2021-07-07 PROCEDURE — 86850 RBC ANTIBODY SCREEN: CPT

## 2021-07-07 NOTE — TELEPHONE ENCOUNTER
I called patients insurance today to check status of patients upcoming surgery. Apparently, [facility] is no longer the place to send clinicals to. I spoke to [place] and answered al her questions. I am to fax clinicals to  [phone] and the reference number is [id number]. I should have an answer within 24 hours.

## 2021-07-07 NOTE — TELEPHONE ENCOUNTER
Pt came in office today stated she had been trying to contact us by phone but was unsuccessful. She is asking for pain meds , She is emotional stating she can not get into her pcp until next week . Surgery is scheduled for fri    [2:25 PM] Alvaro Rangel        Let me look in her chart real quick and Ill speak with Hannah   ? [2:27 PM] Alvaro Rangel      She will need to talk to her PCP about pain medication until her surgery on Friday   Pt verbalized understanding

## 2021-07-08 ENCOUNTER — ANESTHESIA EVENT (OUTPATIENT)
Dept: OPERATING ROOM | Age: 36
End: 2021-07-08
Payer: MEDICAID

## 2021-07-08 ENCOUNTER — TELEPHONE (OUTPATIENT)
Dept: NEUROSURGERY | Age: 36
End: 2021-07-08

## 2021-07-08 NOTE — TELEPHONE ENCOUNTER
I have called sylwia to get a status on patients upcoming surgery,'  [facility]. ( she checked)  She states the agent is working on it currently and I should have an aswer today. I also gave her my direct number,.

## 2021-07-09 ENCOUNTER — APPOINTMENT (OUTPATIENT)
Dept: GENERAL RADIOLOGY | Age: 36
End: 2021-07-09
Attending: NEUROLOGICAL SURGERY
Payer: MEDICAID

## 2021-07-09 ENCOUNTER — ANESTHESIA (OUTPATIENT)
Dept: OPERATING ROOM | Age: 36
End: 2021-07-09
Payer: MEDICAID

## 2021-07-09 ENCOUNTER — HOSPITAL ENCOUNTER (OUTPATIENT)
Age: 36
Setting detail: OUTPATIENT SURGERY
Discharge: HOME | End: 2021-07-09
Attending: NEUROLOGICAL SURGERY | Admitting: NEUROLOGICAL SURGERY
Payer: MEDICAID

## 2021-07-09 ENCOUNTER — TELEPHONE (OUTPATIENT)
Dept: NEUROSURGERY | Age: 36
End: 2021-07-09

## 2021-07-09 VITALS
DIASTOLIC BLOOD PRESSURE: 96 MMHG | BODY MASS INDEX: 36.7 KG/M2 | OXYGEN SATURATION: 98 % | HEART RATE: 125 BPM | RESPIRATION RATE: 16 BRPM | TEMPERATURE: 97.1 F | SYSTOLIC BLOOD PRESSURE: 136 MMHG | WEIGHT: 215 LBS | HEIGHT: 64 IN

## 2021-07-09 VITALS — DIASTOLIC BLOOD PRESSURE: 63 MMHG | OXYGEN SATURATION: 100 % | SYSTOLIC BLOOD PRESSURE: 108 MMHG | TEMPERATURE: 96.8 F

## 2021-07-09 DIAGNOSIS — G89.18 POST-OPERATIVE PAIN: Primary | ICD-10-CM

## 2021-07-09 LAB
ANION GAP SERPL CALCULATED.3IONS-SCNC: 6 MMOL/L (ref 7–19)
APTT: 31.8 SEC (ref 26–36.2)
BUN BLDV-MCNC: 11 MG/DL (ref 6–20)
CALCIUM SERPL-MCNC: 9.2 MG/DL (ref 8.6–10)
CHLORIDE BLD-SCNC: 105 MMOL/L (ref 98–111)
CO2: 26 MMOL/L (ref 22–29)
CREAT SERPL-MCNC: 0.6 MG/DL (ref 0.5–0.9)
GFR AFRICAN AMERICAN: >59
GFR NON-AFRICAN AMERICAN: >60
GLUCOSE BLD-MCNC: 94 MG/DL (ref 74–109)
HCG(URINE) PREGNANCY TEST: NEGATIVE
HCT VFR BLD CALC: 39.2 % (ref 37–47)
HEMOGLOBIN: 12.9 G/DL (ref 12–16)
INR BLD: 0.9 (ref 0.88–1.18)
MCH RBC QN AUTO: 29.4 PG (ref 27–31)
MCHC RBC AUTO-ENTMCNC: 32.9 G/DL (ref 33–37)
MCV RBC AUTO: 89.3 FL (ref 81–99)
PDW BLD-RTO: 13.7 % (ref 11.5–14.5)
PLATELET # BLD: 327 K/UL (ref 130–400)
PMV BLD AUTO: 9.4 FL (ref 9.4–12.3)
POTASSIUM SERPL-SCNC: 4.1 MMOL/L (ref 3.5–4.9)
PROTHROMBIN TIME: 12.4 SEC (ref 12–14.6)
RBC # BLD: 4.39 M/UL (ref 4.2–5.4)
SODIUM BLD-SCNC: 137 MMOL/L (ref 136–145)
WBC # BLD: 9.3 K/UL (ref 4.8–10.8)

## 2021-07-09 PROCEDURE — 6360000002 HC RX W HCPCS: Performed by: ANESTHESIOLOGY

## 2021-07-09 PROCEDURE — 3209999900 FLUORO FOR SURGICAL PROCEDURES

## 2021-07-09 PROCEDURE — 2709999900 HC NON-CHARGEABLE SUPPLY: Performed by: NEUROLOGICAL SURGERY

## 2021-07-09 PROCEDURE — 6370000000 HC RX 637 (ALT 250 FOR IP): Performed by: NEUROLOGICAL SURGERY

## 2021-07-09 PROCEDURE — 2580000003 HC RX 258: Performed by: ANESTHESIOLOGY

## 2021-07-09 PROCEDURE — 3600000005 HC SURGERY LEVEL 5 BASE: Performed by: NEUROLOGICAL SURGERY

## 2021-07-09 PROCEDURE — 3700000000 HC ANESTHESIA ATTENDED CARE: Performed by: NEUROLOGICAL SURGERY

## 2021-07-09 PROCEDURE — 3700000001 HC ADD 15 MINUTES (ANESTHESIA): Performed by: NEUROLOGICAL SURGERY

## 2021-07-09 PROCEDURE — 7100000000 HC PACU RECOVERY - FIRST 15 MIN: Performed by: NEUROLOGICAL SURGERY

## 2021-07-09 PROCEDURE — 84703 CHORIONIC GONADOTROPIN ASSAY: CPT

## 2021-07-09 PROCEDURE — 2500000003 HC RX 250 WO HCPCS

## 2021-07-09 PROCEDURE — 7100000011 HC PHASE II RECOVERY - ADDTL 15 MIN: Performed by: NEUROLOGICAL SURGERY

## 2021-07-09 PROCEDURE — 2580000003 HC RX 258

## 2021-07-09 PROCEDURE — 2580000003 HC RX 258: Performed by: NEUROLOGICAL SURGERY

## 2021-07-09 PROCEDURE — 36415 COLL VENOUS BLD VENIPUNCTURE: CPT

## 2021-07-09 PROCEDURE — 85027 COMPLETE CBC AUTOMATED: CPT

## 2021-07-09 PROCEDURE — 85610 PROTHROMBIN TIME: CPT

## 2021-07-09 PROCEDURE — 85730 THROMBOPLASTIN TIME PARTIAL: CPT

## 2021-07-09 PROCEDURE — 63042 LAMINOTOMY SINGLE LUMBAR: CPT | Performed by: NEUROLOGICAL SURGERY

## 2021-07-09 PROCEDURE — C1769 GUIDE WIRE: HCPCS | Performed by: NEUROLOGICAL SURGERY

## 2021-07-09 PROCEDURE — 3600000015 HC SURGERY LEVEL 5 ADDTL 15MIN: Performed by: NEUROLOGICAL SURGERY

## 2021-07-09 PROCEDURE — 6360000002 HC RX W HCPCS

## 2021-07-09 PROCEDURE — 80048 BASIC METABOLIC PNL TOTAL CA: CPT

## 2021-07-09 PROCEDURE — 7100000001 HC PACU RECOVERY - ADDTL 15 MIN: Performed by: NEUROLOGICAL SURGERY

## 2021-07-09 PROCEDURE — 2500000003 HC RX 250 WO HCPCS: Performed by: ANESTHESIOLOGY

## 2021-07-09 PROCEDURE — 7100000010 HC PHASE II RECOVERY - FIRST 15 MIN: Performed by: NEUROLOGICAL SURGERY

## 2021-07-09 PROCEDURE — 2720000010 HC SURG SUPPLY STERILE: Performed by: NEUROLOGICAL SURGERY

## 2021-07-09 PROCEDURE — 6360000002 HC RX W HCPCS: Performed by: NEUROLOGICAL SURGERY

## 2021-07-09 RX ORDER — MIDAZOLAM HYDROCHLORIDE 1 MG/ML
INJECTION INTRAMUSCULAR; INTRAVENOUS PRN
Status: DISCONTINUED | OUTPATIENT
Start: 2021-07-09 | End: 2021-07-09 | Stop reason: SDUPTHER

## 2021-07-09 RX ORDER — LIDOCAINE HYDROCHLORIDE 10 MG/ML
INJECTION, SOLUTION EPIDURAL; INFILTRATION; INTRACAUDAL; PERINEURAL PRN
Status: DISCONTINUED | OUTPATIENT
Start: 2021-07-09 | End: 2021-07-09 | Stop reason: SDUPTHER

## 2021-07-09 RX ORDER — HYDROCODONE BITARTRATE AND ACETAMINOPHEN 10; 325 MG/1; MG/1
1 TABLET ORAL EVERY 12 HOURS PRN
Qty: 14 TABLET | Refills: 0 | Status: SHIPPED | OUTPATIENT
Start: 2021-07-09 | End: 2021-07-16

## 2021-07-09 RX ORDER — SCOLOPAMINE TRANSDERMAL SYSTEM 1 MG/1
1 PATCH, EXTENDED RELEASE TRANSDERMAL ONCE
Status: DISCONTINUED | OUTPATIENT
Start: 2021-07-09 | End: 2021-07-09 | Stop reason: HOSPADM

## 2021-07-09 RX ORDER — DEXAMETHASONE SODIUM PHOSPHATE 10 MG/ML
INJECTION, SOLUTION INTRAMUSCULAR; INTRAVENOUS PRN
Status: DISCONTINUED | OUTPATIENT
Start: 2021-07-09 | End: 2021-07-09 | Stop reason: SDUPTHER

## 2021-07-09 RX ORDER — SODIUM CHLORIDE, SODIUM LACTATE, POTASSIUM CHLORIDE, CALCIUM CHLORIDE 600; 310; 30; 20 MG/100ML; MG/100ML; MG/100ML; MG/100ML
INJECTION, SOLUTION INTRAVENOUS CONTINUOUS
Status: DISCONTINUED | OUTPATIENT
Start: 2021-07-09 | End: 2021-07-09 | Stop reason: HOSPADM

## 2021-07-09 RX ORDER — PROPOFOL 10 MG/ML
INJECTION, EMULSION INTRAVENOUS PRN
Status: DISCONTINUED | OUTPATIENT
Start: 2021-07-09 | End: 2021-07-09 | Stop reason: SDUPTHER

## 2021-07-09 RX ORDER — HYDROMORPHONE HYDROCHLORIDE 1 MG/ML
0.25 INJECTION, SOLUTION INTRAMUSCULAR; INTRAVENOUS; SUBCUTANEOUS EVERY 5 MIN PRN
Status: DISCONTINUED | OUTPATIENT
Start: 2021-07-09 | End: 2021-07-09 | Stop reason: HOSPADM

## 2021-07-09 RX ORDER — HYDROCODONE BITARTRATE AND ACETAMINOPHEN 10; 325 MG/1; MG/1
1 TABLET ORAL
Status: COMPLETED | OUTPATIENT
Start: 2021-07-09 | End: 2021-07-09

## 2021-07-09 RX ORDER — SUCCINYLCHOLINE CHLORIDE 20 MG/ML
INJECTION INTRAMUSCULAR; INTRAVENOUS PRN
Status: DISCONTINUED | OUTPATIENT
Start: 2021-07-09 | End: 2021-07-09 | Stop reason: SDUPTHER

## 2021-07-09 RX ORDER — FENTANYL CITRATE 50 UG/ML
50 INJECTION, SOLUTION INTRAMUSCULAR; INTRAVENOUS
Status: DISCONTINUED | OUTPATIENT
Start: 2021-07-09 | End: 2021-07-09 | Stop reason: HOSPADM

## 2021-07-09 RX ORDER — SODIUM CHLORIDE 9 MG/ML
25 INJECTION, SOLUTION INTRAVENOUS PRN
Status: DISCONTINUED | OUTPATIENT
Start: 2021-07-09 | End: 2021-07-09 | Stop reason: HOSPADM

## 2021-07-09 RX ORDER — SODIUM CHLORIDE 0.9 % (FLUSH) 0.9 %
10 SYRINGE (ML) INJECTION EVERY 12 HOURS SCHEDULED
Status: DISCONTINUED | OUTPATIENT
Start: 2021-07-09 | End: 2021-07-09 | Stop reason: HOSPADM

## 2021-07-09 RX ORDER — ROCURONIUM BROMIDE 10 MG/ML
INJECTION, SOLUTION INTRAVENOUS PRN
Status: DISCONTINUED | OUTPATIENT
Start: 2021-07-09 | End: 2021-07-09 | Stop reason: SDUPTHER

## 2021-07-09 RX ORDER — PROMETHAZINE HYDROCHLORIDE 25 MG/ML
6.25 INJECTION, SOLUTION INTRAMUSCULAR; INTRAVENOUS
Status: COMPLETED | OUTPATIENT
Start: 2021-07-09 | End: 2021-07-09

## 2021-07-09 RX ORDER — FENTANYL CITRATE 50 UG/ML
INJECTION, SOLUTION INTRAMUSCULAR; INTRAVENOUS PRN
Status: DISCONTINUED | OUTPATIENT
Start: 2021-07-09 | End: 2021-07-09 | Stop reason: SDUPTHER

## 2021-07-09 RX ORDER — SODIUM CHLORIDE, SODIUM LACTATE, POTASSIUM CHLORIDE, CALCIUM CHLORIDE 600; 310; 30; 20 MG/100ML; MG/100ML; MG/100ML; MG/100ML
INJECTION, SOLUTION INTRAVENOUS CONTINUOUS PRN
Status: DISCONTINUED | OUTPATIENT
Start: 2021-07-09 | End: 2021-07-09 | Stop reason: SDUPTHER

## 2021-07-09 RX ORDER — MORPHINE SULFATE 4 MG/ML
4 INJECTION, SOLUTION INTRAMUSCULAR; INTRAVENOUS
Status: DISCONTINUED | OUTPATIENT
Start: 2021-07-09 | End: 2021-07-09 | Stop reason: HOSPADM

## 2021-07-09 RX ORDER — MIDAZOLAM HYDROCHLORIDE 1 MG/ML
2 INJECTION INTRAMUSCULAR; INTRAVENOUS
Status: DISCONTINUED | OUTPATIENT
Start: 2021-07-09 | End: 2021-07-09 | Stop reason: HOSPADM

## 2021-07-09 RX ORDER — SODIUM CHLORIDE 0.9 % (FLUSH) 0.9 %
5-40 SYRINGE (ML) INJECTION PRN
Status: DISCONTINUED | OUTPATIENT
Start: 2021-07-09 | End: 2021-07-09 | Stop reason: HOSPADM

## 2021-07-09 RX ORDER — MEPERIDINE HYDROCHLORIDE 50 MG/ML
12.5 INJECTION INTRAMUSCULAR; INTRAVENOUS; SUBCUTANEOUS EVERY 5 MIN PRN
Status: DISCONTINUED | OUTPATIENT
Start: 2021-07-09 | End: 2021-07-09 | Stop reason: HOSPADM

## 2021-07-09 RX ORDER — LABETALOL HYDROCHLORIDE 5 MG/ML
5 INJECTION, SOLUTION INTRAVENOUS EVERY 10 MIN PRN
Status: DISCONTINUED | OUTPATIENT
Start: 2021-07-09 | End: 2021-07-09 | Stop reason: HOSPADM

## 2021-07-09 RX ORDER — HYDROMORPHONE HYDROCHLORIDE 1 MG/ML
0.5 INJECTION, SOLUTION INTRAMUSCULAR; INTRAVENOUS; SUBCUTANEOUS EVERY 5 MIN PRN
Status: DISCONTINUED | OUTPATIENT
Start: 2021-07-09 | End: 2021-07-09 | Stop reason: HOSPADM

## 2021-07-09 RX ORDER — METHYLPREDNISOLONE ACETATE 40 MG/ML
INJECTION, SUSPENSION INTRA-ARTICULAR; INTRALESIONAL; INTRAMUSCULAR; SOFT TISSUE PRN
Status: DISCONTINUED | OUTPATIENT
Start: 2021-07-09 | End: 2021-07-09 | Stop reason: ALTCHOICE

## 2021-07-09 RX ORDER — DIPHENHYDRAMINE HYDROCHLORIDE 50 MG/ML
12.5 INJECTION INTRAMUSCULAR; INTRAVENOUS
Status: DISCONTINUED | OUTPATIENT
Start: 2021-07-09 | End: 2021-07-09 | Stop reason: HOSPADM

## 2021-07-09 RX ORDER — ONDANSETRON 2 MG/ML
INJECTION INTRAMUSCULAR; INTRAVENOUS PRN
Status: DISCONTINUED | OUTPATIENT
Start: 2021-07-09 | End: 2021-07-09 | Stop reason: SDUPTHER

## 2021-07-09 RX ORDER — HYDRALAZINE HYDROCHLORIDE 20 MG/ML
5 INJECTION INTRAMUSCULAR; INTRAVENOUS EVERY 10 MIN PRN
Status: DISCONTINUED | OUTPATIENT
Start: 2021-07-09 | End: 2021-07-09 | Stop reason: HOSPADM

## 2021-07-09 RX ORDER — SODIUM CHLORIDE 0.9 % (FLUSH) 0.9 %
5-40 SYRINGE (ML) INJECTION EVERY 12 HOURS SCHEDULED
Status: DISCONTINUED | OUTPATIENT
Start: 2021-07-09 | End: 2021-07-09 | Stop reason: HOSPADM

## 2021-07-09 RX ORDER — MORPHINE SULFATE 4 MG/ML
4 INJECTION, SOLUTION INTRAMUSCULAR; INTRAVENOUS
Status: COMPLETED | OUTPATIENT
Start: 2021-07-09 | End: 2021-07-09

## 2021-07-09 RX ORDER — MORPHINE SULFATE 4 MG/ML
2 INJECTION, SOLUTION INTRAMUSCULAR; INTRAVENOUS EVERY 5 MIN PRN
Status: DISCONTINUED | OUTPATIENT
Start: 2021-07-09 | End: 2021-07-09 | Stop reason: HOSPADM

## 2021-07-09 RX ORDER — MORPHINE SULFATE 4 MG/ML
4 INJECTION, SOLUTION INTRAMUSCULAR; INTRAVENOUS EVERY 5 MIN PRN
Status: DISCONTINUED | OUTPATIENT
Start: 2021-07-09 | End: 2021-07-09 | Stop reason: HOSPADM

## 2021-07-09 RX ORDER — LIDOCAINE HYDROCHLORIDE 10 MG/ML
1 INJECTION, SOLUTION EPIDURAL; INFILTRATION; INTRACAUDAL; PERINEURAL
Status: DISCONTINUED | OUTPATIENT
Start: 2021-07-09 | End: 2021-07-09 | Stop reason: HOSPADM

## 2021-07-09 RX ORDER — SODIUM CHLORIDE 0.9 % (FLUSH) 0.9 %
10 SYRINGE (ML) INJECTION PRN
Status: DISCONTINUED | OUTPATIENT
Start: 2021-07-09 | End: 2021-07-09 | Stop reason: HOSPADM

## 2021-07-09 RX ORDER — KETAMINE HYDROCHLORIDE 50 MG/ML
INJECTION, SOLUTION, CONCENTRATE INTRAMUSCULAR; INTRAVENOUS PRN
Status: DISCONTINUED | OUTPATIENT
Start: 2021-07-09 | End: 2021-07-09 | Stop reason: SDUPTHER

## 2021-07-09 RX ORDER — METOCLOPRAMIDE HYDROCHLORIDE 5 MG/ML
10 INJECTION INTRAMUSCULAR; INTRAVENOUS
Status: DISCONTINUED | OUTPATIENT
Start: 2021-07-09 | End: 2021-07-09 | Stop reason: HOSPADM

## 2021-07-09 RX ADMIN — Medication 10 MG: at 08:30

## 2021-07-09 RX ADMIN — DEXAMETHASONE SODIUM PHOSPHATE 10 MG: 10 INJECTION, SOLUTION INTRAMUSCULAR; INTRAVENOUS at 07:57

## 2021-07-09 RX ADMIN — LIDOCAINE HYDROCHLORIDE 5 ML: 10 INJECTION, SOLUTION EPIDURAL; INFILTRATION; INTRACAUDAL; PERINEURAL at 07:34

## 2021-07-09 RX ADMIN — PHENYLEPHRINE HYDROCHLORIDE 160 MCG: 10 INJECTION INTRAVENOUS at 08:34

## 2021-07-09 RX ADMIN — SUGAMMADEX 200 MG: 100 INJECTION, SOLUTION INTRAVENOUS at 09:17

## 2021-07-09 RX ADMIN — PROPOFOL 200 MG: 10 INJECTION, EMULSION INTRAVENOUS at 07:34

## 2021-07-09 RX ADMIN — ONDANSETRON HYDROCHLORIDE 4 MG: 2 INJECTION, SOLUTION INTRAMUSCULAR; INTRAVENOUS at 09:15

## 2021-07-09 RX ADMIN — PHENYLEPHRINE HYDROCHLORIDE 80 MCG: 10 INJECTION INTRAVENOUS at 08:09

## 2021-07-09 RX ADMIN — PHENYLEPHRINE HYDROCHLORIDE 160 MCG: 10 INJECTION INTRAVENOUS at 08:23

## 2021-07-09 RX ADMIN — HYDROMORPHONE HYDROCHLORIDE 1 MG: 1 INJECTION, SOLUTION INTRAMUSCULAR; INTRAVENOUS; SUBCUTANEOUS at 09:31

## 2021-07-09 RX ADMIN — Medication 10 MG: at 09:05

## 2021-07-09 RX ADMIN — SUCCINYLCHOLINE CHLORIDE 120 MG: 20 INJECTION, SOLUTION INTRAMUSCULAR; INTRAVENOUS at 07:34

## 2021-07-09 RX ADMIN — SODIUM CHLORIDE, POTASSIUM CHLORIDE, SODIUM LACTATE AND CALCIUM CHLORIDE: 600; 310; 30; 20 INJECTION, SOLUTION INTRAVENOUS at 07:08

## 2021-07-09 RX ADMIN — MORPHINE SULFATE 4 MG: 4 INJECTION, SOLUTION INTRAMUSCULAR; INTRAVENOUS at 07:08

## 2021-07-09 RX ADMIN — Medication 2000 MG: at 07:30

## 2021-07-09 RX ADMIN — ROCURONIUM BROMIDE 40 MG: 10 INJECTION, SOLUTION INTRAVENOUS at 07:41

## 2021-07-09 RX ADMIN — HYDROCODONE BITARTRATE AND ACETAMINOPHEN 1 TABLET: 10; 325 TABLET ORAL at 10:52

## 2021-07-09 RX ADMIN — PHENYLEPHRINE HYDROCHLORIDE 160 MCG: 10 INJECTION INTRAVENOUS at 08:46

## 2021-07-09 RX ADMIN — MIDAZOLAM 2 MG: 1 INJECTION INTRAMUSCULAR; INTRAVENOUS at 07:30

## 2021-07-09 RX ADMIN — ROCURONIUM BROMIDE 10 MG: 10 INJECTION, SOLUTION INTRAVENOUS at 07:34

## 2021-07-09 RX ADMIN — PROMETHAZINE HYDROCHLORIDE 6.25 MG: 25 INJECTION INTRAMUSCULAR; INTRAVENOUS at 10:52

## 2021-07-09 RX ADMIN — PHENYLEPHRINE HYDROCHLORIDE 80 MCG: 10 INJECTION INTRAVENOUS at 08:19

## 2021-07-09 RX ADMIN — Medication 20 MG: at 09:16

## 2021-07-09 RX ADMIN — PHENYLEPHRINE HYDROCHLORIDE 80 MCG: 10 INJECTION INTRAVENOUS at 08:22

## 2021-07-09 RX ADMIN — FAMOTIDINE 20 MG: 10 INJECTION, SOLUTION INTRAVENOUS at 07:13

## 2021-07-09 RX ADMIN — Medication 10 MG: at 08:05

## 2021-07-09 RX ADMIN — SODIUM CHLORIDE, SODIUM LACTATE, POTASSIUM CHLORIDE, AND CALCIUM CHLORIDE: 600; 310; 30; 20 INJECTION, SOLUTION INTRAVENOUS at 07:30

## 2021-07-09 RX ADMIN — FENTANYL CITRATE 100 MCG: 50 INJECTION, SOLUTION INTRAMUSCULAR; INTRAVENOUS at 07:30

## 2021-07-09 ASSESSMENT — PAIN - FUNCTIONAL ASSESSMENT: PAIN_FUNCTIONAL_ASSESSMENT: 0-10

## 2021-07-09 ASSESSMENT — ENCOUNTER SYMPTOMS: SHORTNESS OF BREATH: 0

## 2021-07-09 ASSESSMENT — PAIN DESCRIPTION - DESCRIPTORS: DESCRIPTORS: ACHING

## 2021-07-09 ASSESSMENT — LIFESTYLE VARIABLES: SMOKING_STATUS: 0

## 2021-07-09 ASSESSMENT — PAIN SCALES - GENERAL
PAINLEVEL_OUTOF10: 9
PAINLEVEL_OUTOF10: 10

## 2021-07-09 NOTE — TELEPHONE ENCOUNTER
I received a call from patient  Stating she is unable to get her rx. \  I called pharmacy and spoke to [address] [place] [address]. She states the norco needs a pa. I called patients insurance and was told to go to a website and print the 5 page form to get the rx approved. I have filled this out and faxed in along with patients chart note. Awaiting decision. \I did call patient and informed her of all this. I also stated that the pharmacy states the rx is 19.54 in case patient just wants to get rx and not wait for the authorization to go thru.

## 2021-07-09 NOTE — ANESTHESIA POSTPROCEDURE EVALUATION
Department of Anesthesiology  Postprocedure Note    Patient: Christie Luis  MRN: [id number]  YOB: 1985  Date of evaluation: 7/9/2021  Time:  9:37 AM     Procedure Summary     Date: 07/09/21 Room / Location: [address]    Anesthesia Start: 0730 Anesthesia Stop: 7807    Procedure: L5-S1 microdiscectomy using minimally invasive technique (Left Back) Diagnosis: (Recurrent disc herniation L5-S1)    Surgeons: Shaggy Flynn DO Responsible Provider: ROXY Good    Anesthesia Type: general ASA Status: 2          Anesthesia Type: general    Rafael Phase I: Rafael Score: 10    Rafael Phase II:      Last vitals: Reviewed and per EMR flowsheets.        Anesthesia Post Evaluation    Patient location during evaluation: PACU  Patient participation: complete - patient cannot participate  Level of consciousness: responsive to noxious stimuli  Pain score: 0  Airway patency: positional obstruction  Nausea & Vomiting: no vomiting and no nausea  Complications: no  Cardiovascular status: hemodynamically stable  Respiratory status: face mask, oral airway and acceptable  Hydration status: stable

## 2021-07-09 NOTE — BRIEF OP NOTE
Brief Postoperative Note      Patient: Naida Davis  YOB: 1985  MRN: [id number]    Date of Procedure: 7/9/2021    Pre-Op Diagnosis: Recurrent disc herniation L5-S1    Post-Op Diagnosis: Same       Procedure(s):  L5-S1 microdiscectomy using minimally invasive technique    Surgeon(s):  Perla Ignacio DO    Assistant:  * No surgical staff found *    Anesthesia: General    Estimated Blood Loss (mL): less than 50     Complications: None    Specimens:   * No specimens in log *    Implants:  * No implants in log *      Drains: * No LDAs found *    Findings: Cartilagenous disc fragment removed. No issues.       Electronically signed by Perla Ignacio DO on 7/9/2021 at 9:31 AM

## 2021-07-09 NOTE — ANESTHESIA PRE PROCEDURE
Department of Anesthesiology  Preprocedure Note       Name:  Yarely Wayne   Age:  43 y.o.  :  1985                                          MRN:  [id number]         Date:  2021      Surgeon: Denise Faustin):  Nakia Cummins DO    Procedure: Procedure(s):  L5-S1 microdiscectomy using minimally invasive technique    Medications prior to admission:   Prior to Admission medications    Medication Sig Start Date End Date Taking? Authorizing Provider   HYDROcodone-acetaminophen (NORCO)  MG per tablet Take 1 tablet by mouth every 4 hours as needed. 10/16/20   Historical Provider, MD   gabapentin (NEURONTIN) 600 MG tablet Take 900 mg by mouth 3 times daily.  21   Historical Provider, MD   vitamin D (ERGOCALCIFEROL) 1.25 MG (20317 UT) CAPS capsule Take 50,000 Units by mouth once a week 21   Historical Provider, MD   DULoxetine (CYMBALTA) 30 MG extended release capsule Take 30 mg by mouth daily 5/15/21   Historical Provider, MD   docusate (COLACE, DULCOLAX) 100 MG CAPS Take 100 mg by mouth daily    Historical Provider, MD   cyclobenzaprine (FLEXERIL) 10 MG tablet Take 10 mg by mouth 3 times daily 20   Historical Provider, MD   diclofenac (VOLTAREN) 75 MG EC tablet Take 75 mg by mouth 2 times daily 21   Historical Provider, MD   calcium carbonate 1500 (600 Ca) MG TABS tablet Take 600 mg by mouth daily    Historical Provider, MD   amitriptyline (ELAVIL) 100 MG tablet Take 100 mg by mouth nightly    Historical Provider, MD       Current medications:    Current Facility-Administered Medications   Medication Dose Route Frequency Provider Last Rate Last Admin    0.9 % sodium chloride infusion  25 mL Intravenous PRN Nakia Cummins DO        ceFAZolin (ANCEF) 2000 mg in 0.9% sodium chloride 50 mL IVPB  2,000 mg Intravenous On [address] to [id number]        sodium chloride flush 0.9 % injection 10 mL  10 mL Intravenous 2 times per day Nakia Cummins DO        sodium chloride flush 0.9 % injection 10 mL  10 mL Intravenous PRN Maurilio Lieberman DO           Allergies: Allergies   Allergen Reactions    Latex Rash    Doxycycline Anaphylaxis     ALL CYCLINES     Tetracyclines & Related Anaphylaxis       Problem List:  There is no problem list on file for this patient. Past Medical History:        Diagnosis Date    Back pain     Headache     Neuropathy        Past Surgical History:        Procedure Laterality Date    APPENDECTOMY      BACK SURGERY      LUMBAR DISCECTOMY      L5-S1       Social History:    Social History     Tobacco Use    Smoking status: Never Smoker    Smokeless tobacco: Never Used   Substance Use Topics    Alcohol use: Never                                Counseling given: Not Answered      Vital Signs (Current):   Vitals:    07/09/21 0630   BP: 129/76   Pulse: 109   Resp: 16   Temp: 97.9 °F (36.6 °C)   TempSrc: Temporal   SpO2: 98%   Weight: 215 lb (97.5 kg)   Height: 5' 4\" (1.626 m)                                              BP Readings from Last 3 Encounters:   07/09/21 129/76   06/29/21 117/84       NPO Status:                                                                                 BMI:   Wt Readings from Last 3 Encounters:   07/09/21 215 lb (97.5 kg)   07/07/21 215 lb (97.5 kg)   06/29/21 221 lb (100.2 kg)     Body mass index is 36.9 kg/m². CBC: No results found for: WBC, RBC, HGB, HCT, MCV, RDW, PLT    CMP: No results found for: NA, K, CL, CO2, BUN, CREATININE, GFRAA, AGRATIO, LABGLOM, GLUCOSE, PROT, CALCIUM, BILITOT, ALKPHOS, AST, ALT    POC Tests: No results for input(s): POCGLU, POCNA, POCK, POCCL, POCBUN, POCHEMO, POCHCT in the last 72 hours.     Coags: No results found for: PROTIME, INR, APTT    HCG (If Applicable):   Lab Results   Component Value Date    PREGTESTUR Negative 07/09/2021        ABGs: No results found for: PHART, PO2ART, FOI7UMT, TTW6BNV, BEART, G2THYLNT     Type & Screen (If Applicable):  No results found for: LABABO, [address]    Drug/Infectious Status (If Applicable):  No results found for: HIV, HEPCAB    COVID-19 Screening (If Applicable):   Lab Results   Component Value Date    COVID19 Not Detected 07/07/2021           Anesthesia Evaluation  Patient summary reviewed and Nursing notes reviewed no history of anesthetic complications:   Airway: Mallampati: II  TM distance: >3 FB   Neck ROM: full  Mouth opening: > = 3 FB Dental: normal exam         Pulmonary:Negative Pulmonary ROS and normal exam  breath sounds clear to auscultation      (-) shortness of breath and not a current smoker          Patient did not smoke on day of surgery. Cardiovascular:        (-) hypertension, CAD,  angina and  CHF    NYHA Classification: I  ECG reviewed  Rhythm: regular  Rate: normal           Beta Blocker:  Not on Beta Blocker         Neuro/Psych:   Negative Neuro/Psych ROS  (+) neuromuscular disease:, headaches: migraine headaches,    (-) seizures, CVA and depression/anxiety             ROS comment: Left foot drop  GI/Hepatic/Renal: Neg GI/Hepatic/Renal ROS       (-) hiatal hernia and GERD       Endo/Other: Negative Endo/Other ROS             Pt had PAT visit. Abdominal:       Abdomen: soft. Vascular: Other Findings: Pt laying in bed in severe pain, crying             Anesthesia Plan      general     ASA 2     (Iv zofran within 30 min of closing   Pt has rationed pain meds, crying in bed ,   Pt admits to regular etoh use and marijuana use )  Induction: intravenous. BIS  MIPS: Postoperative opioids intended and Prophylactic antiemetics administered. Anesthetic plan and risks discussed with patient. Use of blood products discussed with patient whom. Plan discussed with CRNA.     Attending anesthesiologist reviewed and agrees with Pre Eval content              Negar Martel MD   7/9/2021

## 2021-07-09 NOTE — PROGRESS NOTES
Patient sitting straight up in bed crying. Grabbing at left thigh saying it hurts. Tried to reposition for comfort. Wont lay still , insisting on getting up. Sat in bedside commode. Unable to void. Still very anxious and restless. also complains of nausea. Spoke with her at length about breathing through pain. Trying to lie still and proper body mechanics. Sent  home for back brace because patient had forgotten to bring it.

## 2021-07-09 NOTE — PROGRESS NOTES
Nausea and pain meds given. still at bedside. Patient restless. Complains of left thigh and calf pain. We have tried sitting up, lying flat, lying on side. No voiced relief.

## 2021-07-09 NOTE — OP NOTE
Operative Note  NEUROSURGICAL DEPARTMENT REPORT     NAME OF SURGEON/: ASHOK BOSE DO     DATE OF SERVICE: 7/9/2021     PREOPERATIVE DIAGNOSES    1. Left L5- S1 recurrent herniated nucleus pulposus with left L5-S1 radiculopathy, recalcitrant to nonoperatively treatments. POSTOPERATIVE DIAGNOSES    Same     OPERATIVE PROCEDURES  Left L5 and S1 hemilaminotomies with left L5- S1 medial facetectomy and left L5- S1 microdiscectomy for decompression of the left S1 nerve root utilizing the operating microscope,the tubular retractor and microdissection technique. SURGEON  Esequiel Raygoza. DO [place]    Estimated blood loss:  Less than 50 ml     FIRST ASSIST    JEFF Monet     HISTORY  Briefly, 43year old female with hx of previous L5-S1 microdiscectomy on 10/16/2020 per Dr. Kenyon Read. At that time, she complained of LLE that followed a S1 dermatomal pattern and left foot weakness. Following surgery she stated she did not significantly improve. She presented to my clinic requesting a second opinion with very similar complaints that she had prior to her original surgery with a repeat MRI that revealed a recurrent disc herniation at L5-S1 that migrated superiorly behind the vertebral body and resulted in compression of the left S1 nerve root. Various treatment options were discussed and due to her pain she requested that we move forward with another microdiscectomy for the recurrent disc herniation. DESCRIPTION OF PROCEDURE    The patient was brought to the operating room where general endotracheal anesthesia was introduced was positioned on the operating table in the prone position with the Reid frame in place. All pressure points were carefully checked and padded. The C-arm fluoroscope was positioned and draped. Operating microscope was positioned and draped. The lumbosacral region was clipped and prepared for 10 minutes with Betadine scrub, Betadine paint and DuraPrep.  The patient was draped in the usual sterile fashion. After the patient's skin was prepped and draped, the level of the L5- S1 disc space was localized fluoroscopically utilizing a 22-gauge needle. A 16 mm incision was made 1.5 cm off midline at this level. Incision was carried out to subcutaneous tissue. The underlying fascia was penetrated with a K wire. Sequential dilators were inserted over the K wire followed by placement of a 6 cm length 16 mm diameter tubular retractor. A tubular retractor was connected to the articulating arm. The dilators were removed the operating microscope was bought brought into the field. Under microscopic view, hemilaminotomies as well as a medial facetectomy was carried out. Some scar tissue from the previous surgery was noted but it wasn't severe. Bone removal was performed utilizing various Kerrison rongeurs as well as the high-speed drill. The ligamentum flavum was opened and was resected with a fine-angled Kerrison rongeur. This allowed exposure of the dural sac and nerve root. Exploration beneath the nerve root revealed as anticipated that it would a disc herniation. The disc herniation was cartilaginous in nature and smaller in size. The nerve root was gently retracted and protected with a nerve root retractor. Additional scar tissues were removed and S1 nerve root completely freed. Inspection with the ball tip probe revealed the nerve root to be completely decompressed and there were no further fragments. Wound was then copiously irrigated with antibiotic solution and meticulous hemostasis was assured 40 mL of Depo-Medrol was applied. The tubular retractor was removed. The wound was closed in layered fashion. A sterile dressing was applied. The patient was returned to the stretcher in the supine position and was extubated. The patient then returned recovery room in a stable condition.

## 2021-07-09 NOTE — PROGRESS NOTES
Patient continues to complain of pain. Still restless. Back brace is here and oh her. She wants to go home.

## 2021-07-09 NOTE — INTERVAL H&P NOTE
Update History & Physical    The patient's History and Physical of June 29, 2021 was reviewed with the patient and I examined the patient. There was no change. The surgical site was confirmed by the patient and me. Plan: The risks, benefits, expected outcome, and alternative to the recommended procedure have been discussed with the patient. Patient understands and wants to proceed with the procedure.      Electronically signed by Julio Encarnacion DO on 7/9/2021 at 7:09 AM

## 2021-07-09 NOTE — DISCHARGE INSTR - ACTIVITY
Your incision is to stay dry for one week. You should not remove or change the dressing. If the dressing becomes wet or saturated call the office to determine what should be done. You may shower, but stand with your incision site/dressing away from the shower head. It is normal to have some swelling at the incision site. Do not lift anything heavier than a coffee cup and newspaper! No exceptions. Do not push and pull with your arms. This includes sweeping, mopping, vacuuming, and removing laundry from the washer and dryer. You may fold clothes but do not lift the basket full of clothes! Do not drive until cleared by Dr. Sophia Jarrell. This will be AT LEAST 2 weeks, it could be longer. Limit gentle twisting, you should not do anything extreme! Do not bend down to pick anything up. Walk every day. Walking around in your house is fine to begin. Increase distance slowly. Walk short distances several times a day instead of long distances once a day. You have symptoms of infection such as: Increased pain, swelling, warmth, or redness, Red streaks leading from the incision, Pus draining from the incision, A fever please call the office immediately.

## 2021-07-16 ENCOUNTER — OFFICE VISIT (OUTPATIENT)
Dept: NEUROSURGERY | Age: 36
End: 2021-07-16

## 2021-07-16 VITALS
HEART RATE: 135 BPM | WEIGHT: 218 LBS | BODY MASS INDEX: 37.22 KG/M2 | HEIGHT: 64 IN | DIASTOLIC BLOOD PRESSURE: 84 MMHG | SYSTOLIC BLOOD PRESSURE: 137 MMHG

## 2021-07-16 DIAGNOSIS — Z98.890 S/P LUMBAR MICRODISCECTOMY: ICD-10-CM

## 2021-07-16 DIAGNOSIS — M51.36 DDD (DEGENERATIVE DISC DISEASE), LUMBAR: ICD-10-CM

## 2021-07-16 DIAGNOSIS — M21.372 LEFT FOOT DROP: ICD-10-CM

## 2021-07-16 DIAGNOSIS — Z48.89 ENCOUNTER FOR POST SURGICAL WOUND CHECK: Primary | ICD-10-CM

## 2021-07-16 DIAGNOSIS — M79.605 LEFT LEG PAIN: ICD-10-CM

## 2021-07-16 PROCEDURE — 99024 POSTOP FOLLOW-UP VISIT: CPT | Performed by: NURSE PRACTITIONER

## 2021-07-16 ASSESSMENT — ENCOUNTER SYMPTOMS
BACK PAIN: 1
EYES NEGATIVE: 1
GASTROINTESTINAL NEGATIVE: 1
RESPIRATORY NEGATIVE: 1

## 2021-07-16 NOTE — PROGRESS NOTES
[facility] Office Visit      Chief Complaint   Patient presents with    Follow-up     back pain,        HISTORY OF PRESENT ILLNESS:      Willie Grayson is a 43 y.o. female who underwent a L5-S1 microdiscectomy using minimally invasive technique for recurrent disc herniation L5-S1 on 7/09/2021 and now she is 1 week out from her surgery. Prior to surgery she complained of LEFT leg pain that radiated into the buttock, posterior thigh, and posterolateral leg. She also complained of numbness of the same distribution. On physical exam she had significant 2/5 EHL weakness and 4+/5 dorsiflexion. Today she states that her leg pain \"it's a problem\". She continues to have left leg pain, although she states has decreased in some intensity. She states that the numbness has worsened since surgery. She continues to have foot weakness and wears the AFO. She is very upset today stating that she was called \"an opioid addict\" and she just wants the post-op restrictions/instructions and \"I don't want any more contact with this office ever again\". She denies any fever, chills, nausea, or vomiting. Past Medical History:   Diagnosis Date    Back pain     Headache     Neuropathy        Past Surgical History:   Procedure Laterality Date    APPENDECTOMY      BACK SURGERY      LUMBAR DISCECTOMY      L5-S1    LUMBAR LAMINECTOMY Left 7/9/2021    L5-S1 microdiscectomy using minimally invasive technique performed by Carlos Choudhury DO at [facility]        Medications    Current Outpatient Medications:     HYDROcodone-acetaminophen (NORCO)  MG per tablet, Take 1 tablet by mouth every 12 hours as needed for Pain for up to 7 days. , Disp: 14 tablet, Rfl: 0    gabapentin (NEURONTIN) 600 MG tablet, Take 900 mg by mouth 3 times daily. , Disp: , Rfl:     vitamin D (ERGOCALCIFEROL) 1.25 MG (67731 UT) CAPS capsule, Take 50,000 Units by mouth once a week, Disp: , Rfl:     DULoxetine (CYMBALTA) 30 MG extended release capsule, Take 60 mg by mouth daily , Disp: , Rfl:     docusate (COLACE, DULCOLAX) 100 MG CAPS, Take 100 mg by mouth daily, Disp: , Rfl:     cyclobenzaprine (FLEXERIL) 10 MG tablet, Take 10 mg by mouth 3 times daily, Disp: , Rfl:     diclofenac (VOLTAREN) 75 MG EC tablet, Take 75 mg by mouth 2 times daily, Disp: , Rfl:     calcium carbonate 1500 (600 Ca) MG TABS tablet, Take 600 mg by mouth daily, Disp: , Rfl:     amitriptyline (ELAVIL) 100 MG tablet, Take 100 mg by mouth nightly, Disp: , Rfl:   Latex, Doxycycline, and Tetracyclines & related    Social History  Social History     Tobacco Use   Smoking Status Former Smoker    Packs/day: 1.00    Years: 20.00    Pack years: 20.00    Types: Cigarettes   Smokeless Tobacco Never Used     Social History     Substance and Sexual Activity   Alcohol Use Not Currently         History reviewed. No pertinent family history. Review of Systems:  Constitutional: Negative. HENT: Negative. Eyes: Negative. Respiratory: Negative. Cardiovascular: Negative. Gastrointestinal: Negative. Genitourinary: Negative. Musculoskeletal: Positive for back pain. Skin: Negative. Neurological: Negative. Endo/Heme/Allergies: Negative. Psychiatric/Behavioral: Negative. PHYSICAL EXAM:  Vitals:    07/16/21 1302   BP: 137/84   Pulse: 135     Constitutional: The patient appears well-developed andwell-nourished. Eyes  conjunctiva normal.  Conjugate gaze  Ear, nose, throat -No scars, masses, or lesions over external nose or ears, no atrophy of tongue  Neck-symmetric, no masses noted, no jugular vein distension  Respiration- chest wall appears symmetric, goodexpansion, normal effort without use of accessory muscles  Musculoskeletal  no significant wasting of muscles noted, no bony deformities, gait no gross ataxia  Extremities-no clubbing, cyanosis or edema  Skin warm, dry, and intact. No rash, erythema, or pallor.   Psychiatric  mood, affect, and behavior appear normal.     Neurologic Examination  Awake, Alert and oriented x 4  Normal speech pattern, following commands     Motor:  RIGHT:                iliopsoas 5/5               knee flexor 5/5               knee extension 5/5               EHL/dorsiflexion 5/5               plantar flexion 5/5     LEFT:                 iliopsoas 5/5               knee flexor 5/5               knee extension 5/5               EHL 2/5; dorsiflexion 4+/5               plantar flexion 5/5     Decrease to pinprick sensation LEFT lateral leg and foot  Reflexes: absent LEFT achilles  No myofacial tenderness to palpation  Walks with a garcia; wearing LEFT AFO brace       Wound:  C/D/I, steri strips still intact, no signs or symptoms of infection. Wound looks great. DATA and IMAGING:    Lab Results   Component Value Date    WBC 9.3 07/09/2021    HGB 12.9 07/09/2021    HCT 39.2 07/09/2021    MCV 89.3 07/09/2021     07/09/2021     Lab Results   Component Value Date     07/09/2021    K 4.1 07/09/2021     07/09/2021    CO2 26 07/09/2021    BUN 11 07/09/2021    CREATININE 0.6 07/09/2021    GLUCOSE 94 07/09/2021    CALCIUM 9.2 07/09/2021    LABGLOM >60 07/09/2021    GFRAA >59 07/09/2021     Lab Results   Component Value Date    INR 0.90 07/09/2021    PROTIME 12.4 07/09/2021    No results found. ASSESSMENT:   Tarsha Perez is a 43 y.o. female who underwent a L5-S1 microdiscectomy using minimally invasive technique for recurrent disc herniation L5-S1 on 7/09/2021 and now she is 1 week out from her surgery. PLAN:  -I apologized to Mrs. Monica Wall that she felt mistreated and disrespected. I informed her that the increase in numbness feeling is not necessarily atypical.  Usually the intense radicular pain goes away and the numbness and paresthesias can sometimes feel more intense as the nerves start to replenish themselves after decompression.   This typically improves over time but could sometimes take up to a full year to fully recover. Given her history of not doing well from her first surgery puts her at a higher risk of not doing as well from this surgery. I explained that I would like to see her back in 3 weeks for her 1 month follow up, however, she states someone else at her PT office can do the wound check. I asked if she would like to speak with Soo Owens our nurse manager and she stated that she was taking the appropriate steps of reporting the issue.      -Still no lifting more than 5lbs   -Can now shower and get wound wet  -Follow up in 3 weeks             ICD-10-CM    1. Encounter for post surgical wound check  Z48.89    2. S/P lumbar microdiscectomy  Z98.890    3. DDD (degenerative disc disease), lumbar  M51.36    4. Left leg pain  M79.605    5.  Left foot drop  M21.372         Dk Cardoza, APRN

## 2021-08-02 ENCOUNTER — TELEPHONE (OUTPATIENT)
Dept: PODIATRY | Facility: CLINIC | Age: 36
End: 2021-08-02

## 2021-08-03 ENCOUNTER — OFFICE VISIT (OUTPATIENT)
Dept: PODIATRY | Facility: CLINIC | Age: 36
End: 2021-08-03

## 2021-08-03 VITALS
HEIGHT: 65 IN | WEIGHT: 217.2 LBS | DIASTOLIC BLOOD PRESSURE: 76 MMHG | SYSTOLIC BLOOD PRESSURE: 132 MMHG | HEART RATE: 120 BPM | OXYGEN SATURATION: 99 % | BODY MASS INDEX: 36.19 KG/M2

## 2021-08-03 DIAGNOSIS — M76.821 POSTERIOR TIBIAL TENDON DYSFUNCTION (PTTD) OF RIGHT LOWER EXTREMITY: Primary | ICD-10-CM

## 2021-08-03 DIAGNOSIS — M51.27 HERNIATION OF INTERVERTEBRAL DISC BETWEEN L5 AND S1: ICD-10-CM

## 2021-08-03 DIAGNOSIS — M25.372 ANKLE INSTABILITY, LEFT: ICD-10-CM

## 2021-08-03 DIAGNOSIS — M79.672 FOOT PAIN, BILATERAL: ICD-10-CM

## 2021-08-03 DIAGNOSIS — M21.42 PES PLANUS OF BOTH FEET: ICD-10-CM

## 2021-08-03 DIAGNOSIS — M21.41 PES PLANUS OF BOTH FEET: ICD-10-CM

## 2021-08-03 DIAGNOSIS — M21.372 FOOT DROP, LEFT: ICD-10-CM

## 2021-08-03 DIAGNOSIS — M79.671 FOOT PAIN, BILATERAL: ICD-10-CM

## 2021-08-03 PROCEDURE — 99213 OFFICE O/P EST LOW 20 MIN: CPT | Performed by: PODIATRIST

## 2021-08-06 ENCOUNTER — HOSPITAL ENCOUNTER (OUTPATIENT)
Dept: GENERAL RADIOLOGY | Facility: HOSPITAL | Age: 36
Discharge: HOME | End: 2021-08-06

## 2021-08-06 ENCOUNTER — HOSPITAL ENCOUNTER (OUTPATIENT)
Dept: GENERAL RADIOLOGY | Facility: HOSPITAL | Age: 36
Discharge: HOME | End: 2021-08-06
Admitting: PODIATRIST

## 2021-08-06 DIAGNOSIS — M21.41 PES PLANUS OF BOTH FEET: ICD-10-CM

## 2021-08-06 DIAGNOSIS — M21.42 PES PLANUS OF BOTH FEET: ICD-10-CM

## 2021-08-06 DIAGNOSIS — M76.821 POSTERIOR TIBIAL TENDON DYSFUNCTION (PTTD) OF RIGHT LOWER EXTREMITY: ICD-10-CM

## 2021-08-06 PROCEDURE — 73630 X-RAY EXAM OF FOOT: CPT

## 2021-08-06 PROCEDURE — 73610 X-RAY EXAM OF ANKLE: CPT

## 2021-08-09 ENCOUNTER — TELEPHONE (OUTPATIENT)
Dept: PODIATRY | Facility: CLINIC | Age: 36
End: 2021-08-09

## 2021-08-09 NOTE — TELEPHONE ENCOUNTER
Pt called regarding her xray results. Informed her that both her ankle and foot xrays were normal, no bony abnormalities were seen. Right foot does have flat foot deformity. Pt expressed understanding.

## 2021-09-01 ENCOUNTER — TELEPHONE (OUTPATIENT)
Dept: PODIATRY | Facility: CLINIC | Age: 36
End: 2021-09-01

## 2021-09-02 ENCOUNTER — OFFICE VISIT (OUTPATIENT)
Dept: PODIATRY | Facility: CLINIC | Age: 36
End: 2021-09-02

## 2021-09-02 VITALS
BODY MASS INDEX: 35.65 KG/M2 | WEIGHT: 214 LBS | DIASTOLIC BLOOD PRESSURE: 84 MMHG | OXYGEN SATURATION: 98 % | HEIGHT: 65 IN | SYSTOLIC BLOOD PRESSURE: 162 MMHG | HEART RATE: 137 BPM

## 2021-09-02 DIAGNOSIS — M21.41 PES PLANUS OF BOTH FEET: ICD-10-CM

## 2021-09-02 DIAGNOSIS — M51.27 HERNIATION OF INTERVERTEBRAL DISC BETWEEN L5 AND S1: ICD-10-CM

## 2021-09-02 DIAGNOSIS — M21.42 PES PLANUS OF BOTH FEET: ICD-10-CM

## 2021-09-02 DIAGNOSIS — M25.372 ANKLE INSTABILITY, LEFT: ICD-10-CM

## 2021-09-02 DIAGNOSIS — M76.821 POSTERIOR TIBIAL TENDON DYSFUNCTION (PTTD) OF RIGHT LOWER EXTREMITY: Primary | ICD-10-CM

## 2021-09-02 DIAGNOSIS — M79.672 FOOT PAIN, BILATERAL: ICD-10-CM

## 2021-09-02 DIAGNOSIS — M79.671 FOOT PAIN, BILATERAL: ICD-10-CM

## 2021-09-02 DIAGNOSIS — M21.372 FOOT DROP, LEFT: ICD-10-CM

## 2021-09-02 PROCEDURE — 99213 OFFICE O/P EST LOW 20 MIN: CPT | Performed by: PODIATRIST

## 2021-09-02 RX ORDER — PREGABALIN 100 MG/1
100 CAPSULE ORAL 2 TIMES DAILY
COMMUNITY
End: 2022-06-02

## 2021-12-13 ENCOUNTER — HOSPITAL ENCOUNTER (EMERGENCY)
Facility: HOSPITAL | Age: 36
Discharge: HOME | End: 2021-12-14
Attending: FAMILY MEDICINE | Admitting: FAMILY MEDICINE

## 2021-12-13 DIAGNOSIS — G89.18 POSTOPERATIVE PAIN: Primary | ICD-10-CM

## 2021-12-13 LAB
ALBUMIN SERPL-MCNC: 4.1 G/DL (ref 3.5–5.2)
ALBUMIN/GLOB SERPL: 1.3 G/DL
ALP SERPL-CCNC: 85 U/L (ref 39–117)
ALT SERPL W P-5'-P-CCNC: 11 U/L (ref 1–33)
ANION GAP SERPL CALCULATED.3IONS-SCNC: 11 MMOL/L (ref 5–15)
AST SERPL-CCNC: 16 U/L (ref 1–32)
BASOPHILS # BLD AUTO: 0.03 10*3/MM3 (ref 0–0.2)
BASOPHILS NFR BLD AUTO: 0.1 % (ref 0–1.5)
BILIRUB SERPL-MCNC: 0.3 MG/DL (ref 0–1.2)
BUN SERPL-MCNC: 12 MG/DL (ref 6–20)
BUN/CREAT SERPL: 19.7 (ref 7–25)
CALCIUM SPEC-SCNC: 9.4 MG/DL (ref 8.6–10.5)
CHLORIDE SERPL-SCNC: 102 MMOL/L (ref 98–107)
CO2 SERPL-SCNC: 25 MMOL/L (ref 22–29)
CREAT SERPL-MCNC: 0.61 MG/DL (ref 0.57–1)
DEPRECATED RDW RBC AUTO: 43.6 FL (ref 37–54)
EOSINOPHIL # BLD AUTO: 0 10*3/MM3 (ref 0–0.4)
EOSINOPHIL NFR BLD AUTO: 0 % (ref 0.3–6.2)
ERYTHROCYTE [DISTWIDTH] IN BLOOD BY AUTOMATED COUNT: 14 % (ref 12.3–15.4)
GFR SERPL CREATININE-BSD FRML MDRD: 110 ML/MIN/1.73
GLOBULIN UR ELPH-MCNC: 3.1 GM/DL
GLUCOSE SERPL-MCNC: 126 MG/DL (ref 65–99)
HCT VFR BLD AUTO: 31.9 % (ref 34–46.6)
HGB BLD-MCNC: 9.9 G/DL (ref 12–15.9)
HOLD SPECIMEN: NORMAL
HOLD SPECIMEN: NORMAL
IMM GRANULOCYTES # BLD AUTO: 0.8 10*3/MM3 (ref 0–0.05)
IMM GRANULOCYTES NFR BLD AUTO: 3.9 % (ref 0–0.5)
LYMPHOCYTES # BLD AUTO: 2.09 10*3/MM3 (ref 0.7–3.1)
LYMPHOCYTES NFR BLD AUTO: 10.1 % (ref 19.6–45.3)
MCH RBC QN AUTO: 27.3 PG (ref 26.6–33)
MCHC RBC AUTO-ENTMCNC: 31 G/DL (ref 31.5–35.7)
MCV RBC AUTO: 88.1 FL (ref 79–97)
MONOCYTES # BLD AUTO: 1 10*3/MM3 (ref 0.1–0.9)
MONOCYTES NFR BLD AUTO: 4.8 % (ref 5–12)
NEUTROPHILS NFR BLD AUTO: 16.82 10*3/MM3 (ref 1.7–7)
NEUTROPHILS NFR BLD AUTO: 81.1 % (ref 42.7–76)
NRBC BLD AUTO-RTO: 0.1 /100 WBC (ref 0–0.2)
PLATELET # BLD AUTO: 447 10*3/MM3 (ref 140–450)
PMV BLD AUTO: 8.8 FL (ref 6–12)
POTASSIUM SERPL-SCNC: 4.3 MMOL/L (ref 3.5–5.2)
PROT SERPL-MCNC: 7.2 G/DL (ref 6–8.5)
RBC # BLD AUTO: 3.62 10*6/MM3 (ref 3.77–5.28)
SODIUM SERPL-SCNC: 138 MMOL/L (ref 136–145)
WBC NRBC COR # BLD: 20.74 10*3/MM3 (ref 3.4–10.8)
WHOLE BLOOD HOLD SPECIMEN: NORMAL
WHOLE BLOOD HOLD SPECIMEN: NORMAL

## 2021-12-13 PROCEDURE — 86140 C-REACTIVE PROTEIN: CPT | Performed by: NURSE PRACTITIONER

## 2021-12-13 PROCEDURE — 99283 EMERGENCY DEPT VISIT LOW MDM: CPT

## 2021-12-13 PROCEDURE — 96375 TX/PRO/DX INJ NEW DRUG ADDON: CPT

## 2021-12-13 PROCEDURE — 80053 COMPREHEN METABOLIC PANEL: CPT

## 2021-12-13 PROCEDURE — 96374 THER/PROPH/DIAG INJ IV PUSH: CPT

## 2021-12-13 PROCEDURE — 84703 CHORIONIC GONADOTROPIN ASSAY: CPT | Performed by: NURSE PRACTITIONER

## 2021-12-13 PROCEDURE — 85025 COMPLETE CBC W/AUTO DIFF WBC: CPT

## 2021-12-13 PROCEDURE — 85652 RBC SED RATE AUTOMATED: CPT | Performed by: NURSE PRACTITIONER

## 2021-12-13 RX ORDER — ONDANSETRON 2 MG/ML
4 INJECTION INTRAMUSCULAR; INTRAVENOUS ONCE
Status: COMPLETED | OUTPATIENT
Start: 2021-12-13 | End: 2021-12-14

## 2021-12-13 RX ORDER — SODIUM CHLORIDE 0.9 % (FLUSH) 0.9 %
10 SYRINGE (ML) INJECTION AS NEEDED
Status: DISCONTINUED | OUTPATIENT
Start: 2021-12-13 | End: 2021-12-14 | Stop reason: HOSPADM

## 2021-12-14 ENCOUNTER — APPOINTMENT (OUTPATIENT)
Dept: GENERAL RADIOLOGY | Facility: HOSPITAL | Age: 36
End: 2021-12-14

## 2021-12-14 ENCOUNTER — APPOINTMENT (OUTPATIENT)
Dept: CT IMAGING | Facility: HOSPITAL | Age: 36
End: 2021-12-14

## 2021-12-14 VITALS
BODY MASS INDEX: 39.27 KG/M2 | RESPIRATION RATE: 18 BRPM | HEART RATE: 99 BPM | OXYGEN SATURATION: 99 % | DIASTOLIC BLOOD PRESSURE: 77 MMHG | WEIGHT: 230 LBS | HEIGHT: 64 IN | TEMPERATURE: 98.9 F | SYSTOLIC BLOOD PRESSURE: 116 MMHG

## 2021-12-14 LAB
CRP SERPL-MCNC: 1.13 MG/DL (ref 0–0.5)
ERYTHROCYTE [SEDIMENTATION RATE] IN BLOOD: 49 MM/HR (ref 0–20)
HCG SERPL QL: NEGATIVE

## 2021-12-14 PROCEDURE — 25010000002 ONDANSETRON PER 1 MG: Performed by: NURSE PRACTITIONER

## 2021-12-14 PROCEDURE — 96374 THER/PROPH/DIAG INJ IV PUSH: CPT

## 2021-12-14 PROCEDURE — 96375 TX/PRO/DX INJ NEW DRUG ADDON: CPT

## 2021-12-14 PROCEDURE — 73562 X-RAY EXAM OF KNEE 3: CPT

## 2021-12-14 PROCEDURE — 25010000002 IOPAMIDOL 61 % SOLUTION: Performed by: NURSE PRACTITIONER

## 2021-12-14 PROCEDURE — 74177 CT ABD & PELVIS W/CONTRAST: CPT

## 2021-12-14 PROCEDURE — 72131 CT LUMBAR SPINE W/O DYE: CPT

## 2021-12-14 PROCEDURE — 73080 X-RAY EXAM OF ELBOW: CPT

## 2021-12-14 PROCEDURE — 0 HYDROMORPHONE 1 MG/ML SOLUTION: Performed by: NURSE PRACTITIONER

## 2021-12-14 RX ADMIN — HYDROMORPHONE HYDROCHLORIDE 1 MG: 1 INJECTION, SOLUTION INTRAMUSCULAR; INTRAVENOUS; SUBCUTANEOUS at 00:27

## 2021-12-14 RX ADMIN — IOPAMIDOL 100 ML: 612 INJECTION, SOLUTION INTRAVENOUS at 00:20

## 2021-12-14 RX ADMIN — ONDANSETRON 4 MG: 2 INJECTION INTRAMUSCULAR; INTRAVENOUS at 00:27
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
## 2021-12-14 NOTE — ED NOTES
ABD PAD PLACED COVERED WITH MEDIPORE TAPE.  DRESSING CLEAN AND DRY .     Elvin Mcconnell RN  12/14/21 0134

## 2021-12-24 ENCOUNTER — APPOINTMENT (OUTPATIENT)
Dept: CT IMAGING | Facility: HOSPITAL | Age: 36
End: 2021-12-24

## 2021-12-24 ENCOUNTER — HOSPITAL ENCOUNTER (EMERGENCY)
Facility: HOSPITAL | Age: 36
Discharge: HOME | End: 2021-12-24
Attending: STUDENT IN AN ORGANIZED HEALTH CARE EDUCATION/TRAINING PROGRAM | Admitting: STUDENT IN AN ORGANIZED HEALTH CARE EDUCATION/TRAINING PROGRAM

## 2021-12-24 VITALS
WEIGHT: 232 LBS | OXYGEN SATURATION: 99 % | DIASTOLIC BLOOD PRESSURE: 80 MMHG | SYSTOLIC BLOOD PRESSURE: 140 MMHG | HEIGHT: 64 IN | HEART RATE: 78 BPM | BODY MASS INDEX: 39.61 KG/M2 | TEMPERATURE: 98 F | RESPIRATION RATE: 20 BRPM

## 2021-12-24 DIAGNOSIS — R10.84 GENERALIZED ABDOMINAL PAIN: Primary | ICD-10-CM

## 2021-12-24 LAB
ALBUMIN SERPL-MCNC: 4.2 G/DL (ref 3.5–5.2)
ALBUMIN/GLOB SERPL: 1.4 G/DL
ALP SERPL-CCNC: 96 U/L (ref 39–117)
ALT SERPL W P-5'-P-CCNC: 12 U/L (ref 1–33)
ANION GAP SERPL CALCULATED.3IONS-SCNC: 12 MMOL/L (ref 5–15)
AST SERPL-CCNC: 12 U/L (ref 1–32)
B-HCG UR QL: NEGATIVE
BASOPHILS # BLD AUTO: 0.06 10*3/MM3 (ref 0–0.2)
BASOPHILS NFR BLD AUTO: 0.5 % (ref 0–1.5)
BILIRUB SERPL-MCNC: 0.2 MG/DL (ref 0–1.2)
BUN SERPL-MCNC: 12 MG/DL (ref 6–20)
BUN/CREAT SERPL: 15.2 (ref 7–25)
CALCIUM SPEC-SCNC: 9.2 MG/DL (ref 8.6–10.5)
CHLORIDE SERPL-SCNC: 104 MMOL/L (ref 98–107)
CO2 SERPL-SCNC: 24 MMOL/L (ref 22–29)
CREAT SERPL-MCNC: 0.79 MG/DL (ref 0.57–1)
CRP SERPL-MCNC: <0.3 MG/DL (ref 0–0.5)
DEPRECATED RDW RBC AUTO: 46.9 FL (ref 37–54)
EOSINOPHIL # BLD AUTO: 0.16 10*3/MM3 (ref 0–0.4)
EOSINOPHIL NFR BLD AUTO: 1.4 % (ref 0.3–6.2)
ERYTHROCYTE [DISTWIDTH] IN BLOOD BY AUTOMATED COUNT: 14.6 % (ref 12.3–15.4)
ERYTHROCYTE [SEDIMENTATION RATE] IN BLOOD: 23 MM/HR (ref 0–20)
GFR SERPL CREATININE-BSD FRML MDRD: 81 ML/MIN/1.73
GLOBULIN UR ELPH-MCNC: 3 GM/DL
GLUCOSE SERPL-MCNC: 87 MG/DL (ref 65–99)
HCT VFR BLD AUTO: 34.7 % (ref 34–46.6)
HGB BLD-MCNC: 10.9 G/DL (ref 12–15.9)
IMM GRANULOCYTES # BLD AUTO: 0.04 10*3/MM3 (ref 0–0.05)
IMM GRANULOCYTES NFR BLD AUTO: 0.4 % (ref 0–0.5)
LYMPHOCYTES # BLD AUTO: 3.49 10*3/MM3 (ref 0.7–3.1)
LYMPHOCYTES NFR BLD AUTO: 31.1 % (ref 19.6–45.3)
MCH RBC QN AUTO: 27.5 PG (ref 26.6–33)
MCHC RBC AUTO-ENTMCNC: 31.4 G/DL (ref 31.5–35.7)
MCV RBC AUTO: 87.6 FL (ref 79–97)
MONOCYTES # BLD AUTO: 0.95 10*3/MM3 (ref 0.1–0.9)
MONOCYTES NFR BLD AUTO: 8.5 % (ref 5–12)
NEUTROPHILS NFR BLD AUTO: 58.1 % (ref 42.7–76)
NEUTROPHILS NFR BLD AUTO: 6.51 10*3/MM3 (ref 1.7–7)
NRBC BLD AUTO-RTO: 0 /100 WBC (ref 0–0.2)
PLATELET # BLD AUTO: 439 10*3/MM3 (ref 140–450)
PMV BLD AUTO: 8.6 FL (ref 6–12)
POTASSIUM SERPL-SCNC: 4 MMOL/L (ref 3.5–5.2)
PROT SERPL-MCNC: 7.2 G/DL (ref 6–8.5)
RBC # BLD AUTO: 3.96 10*6/MM3 (ref 3.77–5.28)
SODIUM SERPL-SCNC: 140 MMOL/L (ref 136–145)
WBC NRBC COR # BLD: 11.21 10*3/MM3 (ref 3.4–10.8)

## 2021-12-24 PROCEDURE — 86140 C-REACTIVE PROTEIN: CPT | Performed by: STUDENT IN AN ORGANIZED HEALTH CARE EDUCATION/TRAINING PROGRAM

## 2021-12-24 PROCEDURE — 25010000002 IOPAMIDOL 61 % SOLUTION: Performed by: STUDENT IN AN ORGANIZED HEALTH CARE EDUCATION/TRAINING PROGRAM

## 2021-12-24 PROCEDURE — 80053 COMPREHEN METABOLIC PANEL: CPT | Performed by: STUDENT IN AN ORGANIZED HEALTH CARE EDUCATION/TRAINING PROGRAM

## 2021-12-24 PROCEDURE — 81025 URINE PREGNANCY TEST: CPT | Performed by: STUDENT IN AN ORGANIZED HEALTH CARE EDUCATION/TRAINING PROGRAM

## 2021-12-24 PROCEDURE — 85652 RBC SED RATE AUTOMATED: CPT | Performed by: STUDENT IN AN ORGANIZED HEALTH CARE EDUCATION/TRAINING PROGRAM

## 2021-12-24 PROCEDURE — 96374 THER/PROPH/DIAG INJ IV PUSH: CPT

## 2021-12-24 PROCEDURE — 99283 EMERGENCY DEPT VISIT LOW MDM: CPT

## 2021-12-24 PROCEDURE — 25010000002 HYDROMORPHONE PER 4 MG: Performed by: STUDENT IN AN ORGANIZED HEALTH CARE EDUCATION/TRAINING PROGRAM

## 2021-12-24 PROCEDURE — 74177 CT ABD & PELVIS W/CONTRAST: CPT

## 2021-12-24 PROCEDURE — 85025 COMPLETE CBC W/AUTO DIFF WBC: CPT | Performed by: STUDENT IN AN ORGANIZED HEALTH CARE EDUCATION/TRAINING PROGRAM

## 2021-12-24 RX ORDER — HYDROMORPHONE HYDROCHLORIDE 1 MG/ML
0.5 INJECTION, SOLUTION INTRAMUSCULAR; INTRAVENOUS; SUBCUTANEOUS ONCE
Status: COMPLETED | OUTPATIENT
Start: 2021-12-24 | End: 2021-12-24

## 2021-12-24 RX ORDER — CEPHALEXIN 500 MG/1
500 CAPSULE ORAL 2 TIMES DAILY
COMMUNITY
End: 2022-06-02

## 2021-12-24 RX ORDER — METHOCARBAMOL 750 MG/1
750 TABLET, FILM COATED ORAL 4 TIMES DAILY PRN
COMMUNITY
End: 2022-06-02

## 2021-12-24 RX ORDER — GABAPENTIN 600 MG/1
600 TABLET ORAL 3 TIMES DAILY
COMMUNITY

## 2021-12-24 RX ORDER — OXYCODONE AND ACETAMINOPHEN 10; 325 MG/1; MG/1
1 TABLET ORAL EVERY 6 HOURS PRN
COMMUNITY
End: 2022-06-02

## 2021-12-24 RX ADMIN — HYDROMORPHONE HYDROCHLORIDE 0.5 MG: 1 INJECTION, SOLUTION INTRAMUSCULAR; INTRAVENOUS; SUBCUTANEOUS at 20:47

## 2021-12-24 RX ADMIN — IOPAMIDOL 100 ML: 612 INJECTION, SOLUTION INTRAVENOUS at 21:50

## 2022-06-01 ENCOUNTER — TELEPHONE (OUTPATIENT)
Dept: PODIATRY | Facility: CLINIC | Age: 37
End: 2022-06-01

## 2022-06-02 ENCOUNTER — OFFICE VISIT (OUTPATIENT)
Dept: PODIATRY | Facility: CLINIC | Age: 37
End: 2022-06-02

## 2022-06-02 VITALS
HEART RATE: 127 BPM | SYSTOLIC BLOOD PRESSURE: 142 MMHG | WEIGHT: 261.6 LBS | DIASTOLIC BLOOD PRESSURE: 80 MMHG | HEIGHT: 64 IN | OXYGEN SATURATION: 98 % | BODY MASS INDEX: 44.66 KG/M2

## 2022-06-02 DIAGNOSIS — M76.821 POSTERIOR TIBIAL TENDON DYSFUNCTION (PTTD) OF RIGHT LOWER EXTREMITY: ICD-10-CM

## 2022-06-02 DIAGNOSIS — M79.672 FOOT PAIN, BILATERAL: ICD-10-CM

## 2022-06-02 DIAGNOSIS — M21.372 FOOT DROP, LEFT: ICD-10-CM

## 2022-06-02 DIAGNOSIS — M79.671 FOOT PAIN, BILATERAL: ICD-10-CM

## 2022-06-02 DIAGNOSIS — M21.611 BUNION, RIGHT FOOT: ICD-10-CM

## 2022-06-02 DIAGNOSIS — M21.42 PES PLANUS OF BOTH FEET: Primary | ICD-10-CM

## 2022-06-02 DIAGNOSIS — M21.41 PES PLANUS OF BOTH FEET: Primary | ICD-10-CM

## 2022-06-02 PROCEDURE — 99214 OFFICE O/P EST MOD 30 MIN: CPT | Performed by: PODIATRIST

## 2022-06-02 RX ORDER — BUPROPION HYDROCHLORIDE 100 MG/1
100 TABLET ORAL 2 TIMES DAILY
COMMUNITY

## 2022-06-02 RX ORDER — TRAMADOL HYDROCHLORIDE 50 MG/1
50 TABLET ORAL EVERY 6 HOURS PRN
COMMUNITY
End: 2022-08-11

## 2022-06-02 RX ORDER — SERTRALINE HYDROCHLORIDE 100 MG/1
100 TABLET, FILM COATED ORAL DAILY
COMMUNITY

## 2022-06-02 NOTE — PROGRESS NOTES
[facility] - PODIATRY    Today's Date: 06/02/22    Patient Name: Nicole Ibarra  MRN: [id number]  CSN: [id number]  PCP: Anthony Lawler DO  Referring Provider: No ref. provider found    SUBJECTIVE     Chief Complaint   Patient presents with   • Follow-up     Anthony Lawler DO pcp05/14/2021 FOLLOW UP - RIGHT ANKLE PAIN - pt states has gotten worse, hurting more, has had the spine fussion in dec and is ready to discuss surgery on right foot- pt pain 8/10 at worst after walking for a bit, the braces make it hurt worse but help with her balance- pt presents in foot/ankle brace, no other visible issues, pain locate left foot inside edge below ankle     HPI: Nicole Ibarra, a 37 y.o.female, comes to clinic as a(n) established patient for follow-up treatment of foot and ankle pain. Patient has h/o ADHD, foot drop, herniation of intervertebral disc, IBS, radiculopathy, sciatica. Patient presents for follow-up of right foot and ankle pain. Has been wearing [place] brace since last appointment and states that she initially had some improvement but feels like symptoms are worsening again. Feels like brace helps with balance but is not helping with pain. Had spinal fusion in December and was hoping to be farther from spinal surgery prior to consideration of foot surgery but she states that at this point she feels like symptoms warrant correction.  Relates that due to the chronic pain in her foot, she is now interested in having surgical correction of deformities to improve pain and ambulation.  Relates that she continues to have issues with foot drop of the left foot and is wearing an AFO.  Admits pain at 8/10 level and described as aching, throbbing, numbness and tingling. Relates previous treatment(s) including CAM boot, brace, anti-inflammatories. Denies any constitutional symptoms. No other pedal complaints at this time.    Past Medical History:   Diagnosis Date   • ADHD (attention deficit  hyperactivity disorder)     pt states as a child   • DDD (degenerative disc disease), lumbar    • Foot drop, left foot    • Herniation of intervertebral disc between L5 and S1    • History of back surgery     a lift and fusion   • Irritable bowel syndrome    • Lumbosacral radiculopathy 10/16/2020   • Sciatic nerve pain, left      Past Surgical History:   Procedure Laterality Date   • APPENDECTOMY     • COLONOSCOPY     • ENDOSCOPY     • LUMBAR DISCECTOMY Left 10/16/2020    Procedure: LEFT L5-S1 MICRODISCECTOMY;  Surgeon: RUBIA Min MD;  Location: [facility] OR;  Service: Orthopedic Spine;  Laterality: Left;     Family History   Problem Relation Age of Onset   • No Known Problems Mother    • COPD Father    • Heart disease Father    • Diabetes Father    • Heart failure Father    • Atrial fibrillation Father      Social History     Socioeconomic History   • Marital status:    Tobacco Use   • Smoking status: Former Smoker     Packs/day: 0.50     Years: 22.00     Pack years: 11.00     Types: Cigarettes     Quit date: 10/1/2020     Years since quittin.6   • Smokeless tobacco: Never Used   Vaping Use   • Vaping Use: Former   • Substances: Nicotine   • Devices: Disposable, Pre-filled or refillable cartridge   • Passive vaping exposure: Yes   Substance and Sexual Activity   • Alcohol use: Yes     Comment: occ 3 to 4 glasses of wine a week   • Drug use: No   • Sexual activity: Yes     Partners: Male     Allergies   Allergen Reactions   • Doxycycline Anaphylaxis     ALL CYCLINES    • Latex Rash     Current Outpatient Medications   Medication Sig Dispense Refill   • amitriptyline (ELAVIL) 100 MG tablet Take 100 mg by mouth Every Night.     • buPROPion (WELLBUTRIN) 100 MG tablet Take 100 mg by mouth 2 (Two) Times a Day.     • calcium carbonate (OS-MARCUS) 600 MG tablet Take 600 mg by mouth Every Night.     • Cholecalciferol (vitamin D3) 125 MCG (5000 UT) capsule capsule Take 5,000 Units by mouth Daily.     •  cyclobenzaprine (FLEXERIL) 10 MG tablet Take 1 tablet by mouth 2 (Two) Times a Day As Needed for Muscle Spasms. (Patient taking differently: Take 10 mg by mouth 3 (Three) Times a Day As Needed for Muscle Spasms.) 15 tablet 0   • docusate sodium (COLACE) 100 MG capsule Take 100 mg by mouth Every Night.     • Ergocalciferol (VITAMIN D2 PO) Take 50,000 Units by mouth 1 (One) Time Per Week.     • gabapentin (NEURONTIN) 600 MG tablet Take 600 mg by mouth 3 (Three) Times a Day.     • sertraline (ZOLOFT) 100 MG tablet Take 100 mg by mouth Daily.     • traMADol (ULTRAM) 50 MG tablet Take 50 mg by mouth Every 6 (Six) Hours As Needed for Moderate Pain .       No current facility-administered medications for this visit.     Review of Systems   Constitutional: Negative for chills and fever.   HENT: Negative for congestion.    Respiratory: Negative for shortness of breath.    Cardiovascular: Negative for chest pain and leg swelling.   Gastrointestinal: Negative for constipation, diarrhea, nausea and vomiting.   Musculoskeletal: Positive for back pain and gait problem.        Foot pain   Skin: Negative for wound.   Neurological: Positive for weakness and numbness.       OBJECTIVE     Vitals:    06/02/22 1534   BP: 142/80   Pulse: (!) 127   SpO2: 98%       PHYSICAL EXAM  GEN:   Accompanied by none.     Physical Exam  Vitals reviewed.   Constitutional:       Appearance: Normal appearance. She is well-developed. She is obese.   HENT:      Head: Normocephalic and atraumatic.      Right Ear: Tympanic membrane normal.      Left Ear: Tympanic membrane normal.      Nose: Nose normal.      Mouth/Throat:      Pharynx: Oropharynx is clear.   Eyes:      Extraocular Movements: Extraocular movements intact.      Pupils: Pupils are equal, round, and reactive to light.   Cardiovascular:      Rate and Rhythm: Normal rate and regular rhythm.      Pulses: Normal pulses.           Dorsalis pedis pulses are 2+ on the right side and 2+ on the left  side.        Posterior tibial pulses are 2+ on the right side and 2+ on the left side.      Heart sounds: Normal heart sounds.   Pulmonary:      Effort: Pulmonary effort is normal.      Breath sounds: Normal breath sounds.   Abdominal:      General: Bowel sounds are normal.      Palpations: Abdomen is soft.   Musculoskeletal:      Cervical back: Normal range of motion and neck supple.      Right ankle:  over the medial malleolus.      Left ankle:  over the lateral malleolus.      Right foot: Bunion (Mild medial eminence with slight abduction of the hallux.  Able to reduce.  No crepitus.  Small cystic type lesion along the proximal medial aspect.) present.   Feet:      Right foot:      Protective Sensation: 10 sites sensed.      Skin integrity: Warmth present.      Toenail Condition: Right toenails are normal.      Left foot:      Protective Sensation: 7 sites sensed.      Skin integrity: Warmth present.      Toenail Condition: Left toenails are normal.   Neurological:      General: No focal deficit present.      Mental Status: She is alert and oriented to person, place, and time. Mental status is at baseline.   Psychiatric:         Mood and Affect: Mood normal.         Behavior: Behavior normal.         Thought Content: Thought content normal.         Judgment: Judgment normal.         Foot/Ankle Exam:       General:   Appearance: appears stated age and healthy and obesity    Orientation: AAOx3    Affect: appropriate    Gait: antalgic    Assistance: cane    Shoe Gear:  Casual shoes (Left AFO)    VASCULAR      Right Foot Vascularity   Dorsalis pedis:  2+  Posterior tibial:  2+  Skin Temperature: warm    Edema Grading:  None  CFT:  3  Pedal Hair Growth:  Present  Varicosities: none       Left Foot Vascularity   Dorsalis pedis:  2+  Posterior tibial:  2+  Skin Temperature: warm    Edema Grading:  None  CFT:  3  Pedal Hair Growth:  Present  Varicosities: none        NEUROLOGIC     Right Foot Neurologic   Normal  sensation    Light touch sensation:  Normal  Vibratory sensation:  Normal  Hot/Cold sensation: normal    Protective Sensation using Gorman-Zaina Monofilament:  10     Left Foot Neurologic   Light touch sensation:  Diminished  Vibratory sensation:  Diminished  Hot/cold sensation: diminished    Protective Sensation using Gorman-Zaina Monofilament:  7     MUSCULOSKELETAL      Right Foot Musculoskeletal   Ecchymosis:  None  Tenderness: great toe metatarsophalangeal joint, medial malleolus, subtalar joint and posterior tibial tendon    Arch:  Pes planus (Severe decrease in arch height with pronation upon weightbearing.  Mild eversion of the heel and lateral mid foot deviation.  Able to mostly reduce the deformity.  No significant crepitus.)  Hallux valgus: Yes (Mild medial eminence with slight abduction of the hallux.  Able to reduce.  No crepitus.  Small cystic type lesion along the proximal medial aspect.)       Left Foot Musculoskeletal   Ecchymosis:  None  Tenderness: lateral malleolus and peroneal tendon    Tenderness comment:  ATFL  Arch:  Pes planus     MUSCLE STRENGTH     Right Foot Muscle Strength   Foot dorsiflexion:  5  Foot plantar flexion:  5  Foot inversion:  5  Foot eversion:  5     Left Foot Muscle Strength   Foot dorsiflexion:  4-  Foot plantar flexion:  5  Foot inversion:  4-  Foot eversion:  4-     RANGE OF MOTION      Right Foot Range of Motion   Ankle dorsiflexion:  5  ankle dorsiflexion pain    plantar flexion pain    foot eversion pain    foot inversion pain       Left Foot Range of Motion   Foot and ankle ROM within normal limits    active eversion pain    active inversion pain       DERMATOLOGIC     Right Foot Dermatologic   Skin: skin intact    Nails: normal       Left Foot Dermatologic   Skin: skin intact    Nails: normal        RADIOLOGY/NUCLEAR:  No results found.    LABORATORY/CULTURE RESULTS:      PATHOLOGY RESULTS:       ASSESSMENT/PLAN     Diagnoses and all orders for this  visit:    1. Pes planus of both feet (Primary)  -     COVID PRE-OP / PRE-PROCEDURE SCREENING ORDER (NO ISOLATION) - Swab, Nasopharynx; Future  -     Case Request; Standing  -     Instructions on coughing, deep breathing, and incentive spirometry.; Future  -     CBC & Differential; Future  -     Basic Metabolic Panel; Future  -     ECG 12 Lead; Future  -     Pregnancy, Urine - Urine, Clean Catch; Future  -     XR chest 2 vw; Future  -     Case Request    2. Posterior tibial tendon dysfunction (PTTD) of right lower extremity  -     COVID PRE-OP / PRE-PROCEDURE SCREENING ORDER (NO ISOLATION) - Swab, Nasopharynx; Future  -     Case Request; Standing  -     Instructions on coughing, deep breathing, and incentive spirometry.; Future  -     CBC & Differential; Future  -     Basic Metabolic Panel; Future  -     ECG 12 Lead; Future  -     Pregnancy, Urine - Urine, Clean Catch; Future  -     XR chest 2 vw; Future  -     Case Request  -     MRI Foot Right Without Contrast; Future    3. Bunion, right foot  -     COVID PRE-OP / PRE-PROCEDURE SCREENING ORDER (NO ISOLATION) - Swab, Nasopharynx; Future  -     Case Request; Standing  -     Instructions on coughing, deep breathing, and incentive spirometry.; Future  -     CBC & Differential; Future  -     Basic Metabolic Panel; Future  -     ECG 12 Lead; Future  -     Pregnancy, Urine - Urine, Clean Catch; Future  -     XR chest 2 vw; Future  -     Case Request    4. Foot pain, bilateral    5. Foot drop, left    Other orders  -     Follow Anesthesia Guidelines / Protocol; Future  -     Obtain Informed Consent; Future  -     Provide Instructions to Patient Regarding NPO Status; Future  -     Chlorhexidine Skin Prep - Educate and Review With Patient; Future  -     Provide NPO Instructions to Patient      Comprehensive lower extremity examination and evaluation was performed.  Discussed findings and treatment plan including risks, benefits, and treatment options with patient in detail.  Patient agreed with treatment plan.  Again reviewed x-rays with patient.   MRI ordered for further evaluation.  Patient wishes to forego additional conservative therapy and proceed with surgical intervention.  Patient wishes to proceed with joint sparing procedures if possible.   Patient will be initially scheduled for gastrocnemius recession versus Achilles tendon lengthening, Schmidt calcaneal osteotomy, cotton osteotomy, Emil bunionectomy, possible tibial bone graft harvesting, and possible posterior tibial tendon repair/augmentation.  All options, benefits, and risks associate with surgery were discussed with the patient including but not limited to: Standard risk of anesthesia, pain, bleeding, infection, nonhealing/dehiscence, nonunion, deformity, loss of limb or life.  Pre and postoperative courses were discussed in detail including minimum 6 to 8 weeks nonweightbearing status postoperatively.  No guarantees were inferred.  Patient will tentatively be scheduled for postoperative admission for pain control.  An After Visit Summary was printed and given to the patient at discharge, including (if requested) any available informative/educational handouts regarding diagnosis, treatment, or medications. All questions were answered to patient/family satisfaction. Should symptoms fail to improve or worsen they agree to call or return to clinic or to go to the Emergency Department. Discussed the importance of following up with any needed screening tests/labs/specialist appointments and any requested follow-up recommended by me today. Importance of maintaining follow-up discussed and patient accepts that missed appointments can delay diagnosis and potentially lead to worsening of conditions.  Return for Post-Op appointment., or sooner if acute issues arise.        This document has been electronically signed by Jerry Adrian DPM on June 2, 2022 17:15 CDT

## 2022-06-02 NOTE — H&P
[facility] - PODIATRY    Today's Date: 06/02/22    Patient Name: Nicole Ibarra  MRN: [id number]  CSN: [id number]  PCP: Anthony Lawler DO  Referring Provider: No ref. provider found    SUBJECTIVE     Chief Complaint   Patient presents with   • Follow-up     Anthony Lawler DO pcp05/14/2021 FOLLOW UP - RIGHT ANKLE PAIN - pt states has gotten worse, hurting more, has had the spine fussion in dec and is ready to discuss surgery on right foot- pt pain 8/10 at worst after walking for a bit, the braces make it hurt worse but help with her balance- pt presents in foot/ankle brace, no other visible issues, pain locate left foot inside edge below ankle     HPI: Nicole Ibarra, a 37 y.o.female, comes to clinic as a(n) established patient for follow-up treatment of foot and ankle pain. Patient has h/o ADHD, foot drop, herniation of intervertebral disc, IBS, radiculopathy, sciatica. Patient presents for follow-up of right foot and ankle pain. Has been wearing [place] brace since last appointment and states that she initially had some improvement but feels like symptoms are worsening again. Feels like brace helps with balance but is not helping with pain. Had spinal fusion in December and was hoping to be farther from spinal surgery prior to consideration of foot surgery but she states that at this point she feels like symptoms warrant correction.  Relates that due to the chronic pain in her foot, she is now interested in having surgical correction of deformities to improve pain and ambulation.  Relates that she continues to have issues with foot drop of the left foot and is wearing an AFO.  Admits pain at 8/10 level and described as aching, throbbing, numbness and tingling. Relates previous treatment(s) including CAM boot, brace, anti-inflammatories. Denies any constitutional symptoms. No other pedal complaints at this time.    Past Medical History:   Diagnosis Date   • ADHD (attention deficit  hyperactivity disorder)     pt states as a child   • DDD (degenerative disc disease), lumbar    • Foot drop, left foot    • Herniation of intervertebral disc between L5 and S1    • History of back surgery     a lift and fusion   • Irritable bowel syndrome    • Lumbosacral radiculopathy 10/16/2020   • Sciatic nerve pain, left      Past Surgical History:   Procedure Laterality Date   • APPENDECTOMY     • COLONOSCOPY     • ENDOSCOPY     • LUMBAR DISCECTOMY Left 10/16/2020    Procedure: LEFT L5-S1 MICRODISCECTOMY;  Surgeon: RUBIA Min MD;  Location: [facility] OR;  Service: Orthopedic Spine;  Laterality: Left;     Family History   Problem Relation Age of Onset   • No Known Problems Mother    • COPD Father    • Heart disease Father    • Diabetes Father    • Heart failure Father    • Atrial fibrillation Father      Social History     Socioeconomic History   • Marital status:    Tobacco Use   • Smoking status: Former Smoker     Packs/day: 0.50     Years: 22.00     Pack years: 11.00     Types: Cigarettes     Quit date: 10/1/2020     Years since quittin.6   • Smokeless tobacco: Never Used   Vaping Use   • Vaping Use: Former   • Substances: Nicotine   • Devices: Disposable, Pre-filled or refillable cartridge   • Passive vaping exposure: Yes   Substance and Sexual Activity   • Alcohol use: Yes     Comment: occ 3 to 4 glasses of wine a week   • Drug use: No   • Sexual activity: Yes     Partners: Male     Allergies   Allergen Reactions   • Doxycycline Anaphylaxis     ALL CYCLINES    • Latex Rash     Current Outpatient Medications   Medication Sig Dispense Refill   • amitriptyline (ELAVIL) 100 MG tablet Take 100 mg by mouth Every Night.     • buPROPion (WELLBUTRIN) 100 MG tablet Take 100 mg by mouth 2 (Two) Times a Day.     • calcium carbonate (OS-MARCUS) 600 MG tablet Take 600 mg by mouth Every Night.     • Cholecalciferol (vitamin D3) 125 MCG (5000 UT) capsule capsule Take 5,000 Units by mouth Daily.     •  cyclobenzaprine (FLEXERIL) 10 MG tablet Take 1 tablet by mouth 2 (Two) Times a Day As Needed for Muscle Spasms. (Patient taking differently: Take 10 mg by mouth 3 (Three) Times a Day As Needed for Muscle Spasms.) 15 tablet 0   • docusate sodium (COLACE) 100 MG capsule Take 100 mg by mouth Every Night.     • Ergocalciferol (VITAMIN D2 PO) Take 50,000 Units by mouth 1 (One) Time Per Week.     • gabapentin (NEURONTIN) 600 MG tablet Take 600 mg by mouth 3 (Three) Times a Day.     • sertraline (ZOLOFT) 100 MG tablet Take 100 mg by mouth Daily.     • traMADol (ULTRAM) 50 MG tablet Take 50 mg by mouth Every 6 (Six) Hours As Needed for Moderate Pain .       No current facility-administered medications for this visit.     Review of Systems   Constitutional: Negative for chills and fever.   HENT: Negative for congestion.    Respiratory: Negative for shortness of breath.    Cardiovascular: Negative for chest pain and leg swelling.   Gastrointestinal: Negative for constipation, diarrhea, nausea and vomiting.   Musculoskeletal: Positive for back pain and gait problem.        Foot pain   Skin: Negative for wound.   Neurological: Positive for weakness and numbness.       OBJECTIVE     Vitals:    06/02/22 1534   BP: 142/80   Pulse: (!) 127   SpO2: 98%       PHYSICAL EXAM  GEN:   Accompanied by none.     Physical Exam  Vitals reviewed.   Constitutional:       Appearance: Normal appearance. She is well-developed. She is obese.   HENT:      Head: Normocephalic and atraumatic.      Right Ear: Tympanic membrane normal.      Left Ear: Tympanic membrane normal.      Nose: Nose normal.      Mouth/Throat:      Pharynx: Oropharynx is clear.   Eyes:      Extraocular Movements: Extraocular movements intact.      Pupils: Pupils are equal, round, and reactive to light.   Cardiovascular:      Rate and Rhythm: Normal rate and regular rhythm.      Pulses: Normal pulses.           Dorsalis pedis pulses are 2+ on the right side and 2+ on the left  side.        Posterior tibial pulses are 2+ on the right side and 2+ on the left side.      Heart sounds: Normal heart sounds.   Pulmonary:      Effort: Pulmonary effort is normal.      Breath sounds: Normal breath sounds.   Abdominal:      General: Bowel sounds are normal.      Palpations: Abdomen is soft.   Musculoskeletal:      Cervical back: Normal range of motion and neck supple.      Right ankle:  over the medial malleolus.      Left ankle:  over the lateral malleolus.      Right foot: Bunion (Mild medial eminence with slight abduction of the hallux.  Able to reduce.  No crepitus.  Small cystic type lesion along the proximal medial aspect.) present.   Feet:      Right foot:      Protective Sensation: 10 sites sensed.      Skin integrity: Warmth present.      Toenail Condition: Right toenails are normal.      Left foot:      Protective Sensation: 7 sites sensed.      Skin integrity: Warmth present.      Toenail Condition: Left toenails are normal.   Neurological:      General: No focal deficit present.      Mental Status: She is alert and oriented to person, place, and time. Mental status is at baseline.   Psychiatric:         Mood and Affect: Mood normal.         Behavior: Behavior normal.         Thought Content: Thought content normal.         Judgment: Judgment normal.         Foot/Ankle Exam:       General:   Appearance: appears stated age and healthy and obesity    Orientation: AAOx3    Affect: appropriate    Gait: antalgic    Assistance: cane    Shoe Gear:  Casual shoes (Left AFO)    VASCULAR      Right Foot Vascularity   Dorsalis pedis:  2+  Posterior tibial:  2+  Skin Temperature: warm    Edema Grading:  None  CFT:  3  Pedal Hair Growth:  Present  Varicosities: none       Left Foot Vascularity   Dorsalis pedis:  2+  Posterior tibial:  2+  Skin Temperature: warm    Edema Grading:  None  CFT:  3  Pedal Hair Growth:  Present  Varicosities: none        NEUROLOGIC     Right Foot Neurologic   Normal  sensation    Light touch sensation:  Normal  Vibratory sensation:  Normal  Hot/Cold sensation: normal    Protective Sensation using [place]-Zaina Monofilament:  10     Left Foot Neurologic   Light touch sensation:  Diminished  Vibratory sensation:  Diminished  Hot/cold sensation: diminished    Protective Sensation using [place]-Zaina Monofilament:  7     MUSCULOSKELETAL      Right Foot Musculoskeletal   Ecchymosis:  None  Tenderness: medial malleolus and posterior tibial tendon    Arch:  Pes planus (Severe decrease in arch height with pronation upon weightbearing.  Mild eversion of the heel and lateral mid foot deviation.  Able to mostly reduce the deformity.  No significant crepitus.)  Hallux valgus: Yes (Mild medial eminence with slight abduction of the hallux.  Able to reduce.  No crepitus.  Small cystic type lesion along the proximal medial aspect.)       Left Foot Musculoskeletal   Ecchymosis:  None  Tenderness: lateral malleolus and peroneal tendon    Tenderness comment:  ATFL  Arch:  Pes planus     MUSCLE STRENGTH     Right Foot Muscle Strength   Foot dorsiflexion:  5  Foot plantar flexion:  5  Foot inversion:  5  Foot eversion:  5     Left Foot Muscle Strength   Foot dorsiflexion:  4-  Foot plantar flexion:  5  Foot inversion:  4-  Foot eversion:  4-     RANGE OF MOTION      Right Foot Range of Motion   Ankle dorsiflexion:  5  ankle dorsiflexion pain    plantar flexion pain    foot eversion pain    foot inversion pain       Left Foot Range of Motion   Foot and ankle ROM within normal limits    active eversion pain    active inversion pain       DERMATOLOGIC     Right Foot Dermatologic   Skin: skin intact    Nails: normal       Left Foot Dermatologic   Skin: skin intact    Nails: normal        RADIOLOGY/NUCLEAR:  No results found.    LABORATORY/CULTURE RESULTS:      PATHOLOGY RESULTS:       ASSESSMENT/PLAN     Diagnoses and all orders for this visit:    1. Pes planus of both feet (Primary)  -     COVID  PRE-OP / PRE-PROCEDURE SCREENING ORDER (NO ISOLATION) - Swab, Nasopharynx; Future  -     Case Request; Standing  -     Instructions on coughing, deep breathing, and incentive spirometry.; Future  -     CBC & Differential; Future  -     Basic Metabolic Panel; Future  -     ECG 12 Lead; Future  -     Pregnancy, Urine - Urine, Clean Catch; Future  -     XR chest 2 vw; Future  -     Case Request    2. Posterior tibial tendon dysfunction (PTTD) of right lower extremity  -     COVID PRE-OP / PRE-PROCEDURE SCREENING ORDER (NO ISOLATION) - Swab, Nasopharynx; Future  -     Case Request; Standing  -     Instructions on coughing, deep breathing, and incentive spirometry.; Future  -     CBC & Differential; Future  -     Basic Metabolic Panel; Future  -     ECG 12 Lead; Future  -     Pregnancy, Urine - Urine, Clean Catch; Future  -     XR chest 2 vw; Future  -     Case Request  -     MRI Foot Right Without Contrast; Future    3. Bunion, right foot  -     COVID PRE-OP / PRE-PROCEDURE SCREENING ORDER (NO ISOLATION) - Swab, Nasopharynx; Future  -     Case Request; Standing  -     Instructions on coughing, deep breathing, and incentive spirometry.; Future  -     CBC & Differential; Future  -     Basic Metabolic Panel; Future  -     ECG 12 Lead; Future  -     Pregnancy, Urine - Urine, Clean Catch; Future  -     XR chest 2 vw; Future  -     Case Request    4. Foot pain, bilateral    5. Foot drop, left    Other orders  -     Follow Anesthesia Guidelines / Protocol; Future  -     Obtain Informed Consent; Future  -     Provide Instructions to Patient Regarding NPO Status; Future  -     Chlorhexidine Skin Prep - Educate and Review With Patient; Future  -     Provide NPO Instructions to Patient      Comprehensive lower extremity examination and evaluation was performed.  Discussed findings and treatment plan including risks, benefits, and treatment options with patient in detail. Patient agreed with treatment plan.  Again reviewed x-rays  with patient.   MRI ordered for further evaluation.  Patient wishes to forego additional conservative therapy and proceed with surgical intervention.  Patient wishes to proceed with joint sparing procedures if possible.   Patient will be initially scheduled for cataract anemias recession versus Achilles tendon lengthening, Schmidt calcaneal osteotomy, cotton osteotomy, Emil bunionectomy, possible tibial bone graft harvesting, and possible posterior tibial tendon repair/augmentation.  All options, benefits, and risks associate with surgery were discussed with the patient including but not limited to: Standard risk of anesthesia, pain, bleeding, infection, nonhealing/dehiscence, nonunion, deformity, loss of limb or life.  Pre and postoperative courses were discussed in detail including minimum 6 to 8 weeks nonweightbearing status postoperatively.  No guarantees were inferred.  Patient will tentatively be scheduled for postoperative admission for pain control.  An After Visit Summary was printed and given to the patient at discharge, including (if requested) any available informative/educational handouts regarding diagnosis, treatment, or medications. All questions were answered to patient/family satisfaction. Should symptoms fail to improve or worsen they agree to call or return to clinic or to go to the Emergency Department. Discussed the importance of following up with any needed screening tests/labs/specialist appointments and any requested follow-up recommended by me today. Importance of maintaining follow-up discussed and patient accepts that missed appointments can delay diagnosis and potentially lead to worsening of conditions.  Return for Post-Op appointment., or sooner if acute issues arise.        This document has been electronically signed by Jerry Adrian DPM on June 2, 2022 17:22 [facility] - PODIATRY    Today's Date: 06/02/22    Patient Name: Nicole LAKEISHA Ibarra  MRN:  [id number]  [facility]: [id number]  PCP: Anthony Lawler DO  Referring Provider: No ref. provider found    SUBJECTIVE     Chief Complaint   Patient presents with   • Follow-up     Anthony Lawler DO pcp05/14/2021 FOLLOW UP - RIGHT ANKLE PAIN - pt states has gotten worse, hurting more, has had the spine fussion in dec and is ready to discuss surgery on right foot- pt pain 8/10 at worst after walking for a bit, the braces make it hurt worse but help with her balance- pt presents in foot/ankle brace, no other visible issues, pain locate left foot inside edge below ankle     HPI: Nicole Ibarra, a 37 y.o.female, comes to clinic as a(n) established patient for follow-up treatment of foot and ankle pain. Patient has h/o ADHD, foot drop, herniation of intervertebral disc, IBS, radiculopathy, sciatica. Patient presents for follow-up of right foot and ankle pain. Has been wearing [place] brace since last appointment and states that she initially had some improvement but feels like symptoms are worsening again. Feels like brace helps with balance but is not helping with pain. Had spinal fusion in December and was hoping to be farther from spinal surgery prior to consideration of foot surgery but she states that at this point she feels like symptoms warrant correction.  Relates that due to the chronic pain in her foot, she is now interested in having surgical correction of deformities to improve pain and ambulation.  Relates that she continues to have issues with foot drop of the left foot and is wearing an AFO.  Admits pain at 8/10 level and described as aching, throbbing, numbness and tingling. Relates previous treatment(s) including CAM boot, brace, anti-inflammatories. Denies any constitutional symptoms. No other pedal complaints at this time.    Past Medical History:   Diagnosis Date   • ADHD (attention deficit hyperactivity disorder)     pt states as a child   • DDD (degenerative disc disease), lumbar    • Foot drop,  left foot    • Herniation of intervertebral disc between L5 and S1    • History of back surgery     a lift and fusion   • Irritable bowel syndrome    • Lumbosacral radiculopathy 10/16/2020   • Sciatic nerve pain, left      Past Surgical History:   Procedure Laterality Date   • APPENDECTOMY     • COLONOSCOPY     • ENDOSCOPY     • LUMBAR DISCECTOMY Left 10/16/2020    Procedure: LEFT L5-S1 MICRODISCECTOMY;  Surgeon: RUBIA Min MD;  [facility] OR;  Service: Orthopedic Spine;  Laterality: Left;     Family History   Problem Relation Age of Onset   • No Known Problems Mother    • COPD Father    • Heart disease Father    • Diabetes Father    • Heart failure Father    • Atrial fibrillation Father      Social History     Socioeconomic History   • Marital status:    Tobacco Use   • Smoking status: Former Smoker     Packs/day: 0.50     Years: 22.00     Pack years: 11.00     Types: Cigarettes     Quit date: 10/1/2020     Years since quittin.6   • Smokeless tobacco: Never Used   Vaping Use   • Vaping Use: Former   • Substances: Nicotine   • Devices: Disposable, Pre-filled or refillable cartridge   • Passive vaping exposure: Yes   Substance and Sexual Activity   • Alcohol use: Yes     Comment: occ 3 to 4 glasses of wine a week   • Drug use: No   • Sexual activity: Yes     Partners: Male     Allergies   Allergen Reactions   • Doxycycline Anaphylaxis     ALL CYCLINES    • Latex Rash     Current Outpatient Medications   Medication Sig Dispense Refill   • amitriptyline (ELAVIL) 100 MG tablet Take 100 mg by mouth Every Night.     • buPROPion (WELLBUTRIN) 100 MG tablet Take 100 mg by mouth 2 (Two) Times a Day.     • calcium carbonate (OS-MARCUS) 600 MG tablet Take 600 mg by mouth Every Night.     • Cholecalciferol (vitamin D3) 125 MCG (5000 UT) capsule capsule Take 5,000 Units by mouth Daily.     • cyclobenzaprine (FLEXERIL) 10 MG tablet Take 1 tablet by mouth 2 (Two) Times a Day As Needed for Muscle Spasms.  (Patient taking differently: Take 10 mg by mouth 3 (Three) Times a Day As Needed for Muscle Spasms.) 15 tablet 0   • docusate sodium (COLACE) 100 MG capsule Take 100 mg by mouth Every Night.     • Ergocalciferol (VITAMIN D2 PO) Take 50,000 Units by mouth 1 (One) Time Per Week.     • gabapentin (NEURONTIN) 600 MG tablet Take 600 mg by mouth 3 (Three) Times a Day.     • sertraline (ZOLOFT) 100 MG tablet Take 100 mg by mouth Daily.     • traMADol (ULTRAM) 50 MG tablet Take 50 mg by mouth Every 6 (Six) Hours As Needed for Moderate Pain .       No current facility-administered medications for this visit.     Review of Systems   Constitutional: Negative for chills and fever.   HENT: Negative for congestion.    Respiratory: Negative for shortness of breath.    Cardiovascular: Negative for chest pain and leg swelling.   Gastrointestinal: Negative for constipation, diarrhea, nausea and vomiting.   Musculoskeletal: Positive for back pain and gait problem.        Foot pain   Skin: Negative for wound.   Neurological: Positive for weakness and numbness.       OBJECTIVE     Vitals:    06/02/22 1534   BP: 142/80   Pulse: (!) 127   SpO2: 98%       PHYSICAL EXAM  GEN:   Accompanied by none.     Physical Exam  Vitals reviewed.   Constitutional:       Appearance: Normal appearance. She is well-developed. She is obese.   HENT:      Head: Normocephalic and atraumatic.      Right Ear: Tympanic membrane normal.      Left Ear: Tympanic membrane normal.      Nose: Nose normal.      Mouth/Throat:      Pharynx: Oropharynx is clear.   Eyes:      Extraocular Movements: Extraocular movements intact.      Pupils: Pupils are equal, round, and reactive to light.   Cardiovascular:      Rate and Rhythm: Normal rate and regular rhythm.      Pulses: Normal pulses.           Dorsalis pedis pulses are 2+ on the right side and 2+ on the left side.        Posterior tibial pulses are 2+ on the right side and 2+ on the left side.      Heart sounds: Normal  heart sounds.   Pulmonary:      Effort: Pulmonary effort is normal.      Breath sounds: Normal breath sounds.   Abdominal:      General: Bowel sounds are normal.      Palpations: Abdomen is soft.   Musculoskeletal:      Cervical back: Normal range of motion and neck supple.      Right ankle:  over the medial malleolus.      Left ankle:  over the lateral malleolus.      Right foot: Bunion (Mild medial eminence with slight abduction of the hallux.  Able to reduce.  No crepitus.  Small cystic type lesion along the proximal medial aspect.) present.   Feet:      Right foot:      Protective Sensation: 10 sites sensed.      Skin integrity: Warmth present.      Toenail Condition: Right toenails are normal.      Left foot:      Protective Sensation: 7 sites sensed.      Skin integrity: Warmth present.      Toenail Condition: Left toenails are normal.   Neurological:      General: No focal deficit present.      Mental Status: She is alert and oriented to person, place, and time. Mental status is at baseline.   Psychiatric:         Mood and Affect: Mood normal.         Behavior: Behavior normal.         Thought Content: Thought content normal.         Judgment: Judgment normal.         Foot/Ankle Exam:       General:   Appearance: appears stated age and healthy and obesity    Orientation: AAOx3    Affect: appropriate    Gait: antalgic    Assistance: cane    Shoe Gear:  Casual shoes (Left AFO)    VASCULAR      Right Foot Vascularity   Dorsalis pedis:  2+  Posterior tibial:  2+  Skin Temperature: warm    Edema Grading:  None  CFT:  3  Pedal Hair Growth:  Present  Varicosities: none       Left Foot Vascularity   Dorsalis pedis:  2+  Posterior tibial:  2+  Skin Temperature: warm    Edema Grading:  None  CFT:  3  Pedal Hair Growth:  Present  Varicosities: none        NEUROLOGIC     Right Foot Neurologic   Normal sensation    Light touch sensation:  Normal  Vibratory sensation:  Normal  Hot/Cold sensation: normal    Protective  Sensation using [place]-Zaina Monofilament:  10     Left Foot Neurologic   Light touch sensation:  Diminished  Vibratory sensation:  Diminished  Hot/cold sensation: diminished    Protective Sensation using [place]-Zaina Monofilament:  7     MUSCULOSKELETAL      Right Foot Musculoskeletal   Ecchymosis:  None  Tenderness: great toe metatarsophalangeal joint, medial malleolus, subtalar joint and posterior tibial tendon    Arch:  Pes planus (Severe decrease in arch height with pronation upon weightbearing.  Mild eversion of the heel and lateral mid foot deviation.  Able to mostly reduce the deformity.  No significant crepitus.)  Hallux valgus: Yes (Mild medial eminence with slight abduction of the hallux.  Able to reduce.  No crepitus.  Small cystic type lesion along the proximal medial aspect.)       Left Foot Musculoskeletal   Ecchymosis:  None  Tenderness: lateral malleolus and peroneal tendon    Tenderness comment:  ATFL  Arch:  Pes planus     MUSCLE STRENGTH     Right Foot Muscle Strength   Foot dorsiflexion:  5  Foot plantar flexion:  5  Foot inversion:  5  Foot eversion:  5     Left Foot Muscle Strength   Foot dorsiflexion:  4-  Foot plantar flexion:  5  Foot inversion:  4-  Foot eversion:  4-     RANGE OF MOTION      Right Foot Range of Motion   Ankle dorsiflexion:  5  ankle dorsiflexion pain    plantar flexion pain    foot eversion pain    foot inversion pain       Left Foot Range of Motion   Foot and ankle ROM within normal limits    active eversion pain    active inversion pain       DERMATOLOGIC     Right Foot Dermatologic   Skin: skin intact    Nails: normal       Left Foot Dermatologic   Skin: skin intact    Nails: normal        RADIOLOGY/NUCLEAR:  No results found.    LABORATORY/CULTURE RESULTS:      PATHOLOGY RESULTS:       ASSESSMENT/PLAN     Diagnoses and all orders for this visit:    1. Pes planus of both feet (Primary)  -     COVID PRE-OP / PRE-PROCEDURE SCREENING ORDER (NO ISOLATION) - Swab,  Nasopharynx; Future  -     Case Request; Standing  -     Instructions on coughing, deep breathing, and incentive spirometry.; Future  -     CBC & Differential; Future  -     Basic Metabolic Panel; Future  -     ECG 12 Lead; Future  -     Pregnancy, Urine - Urine, Clean Catch; Future  -     XR chest 2 vw; Future  -     Case Request    2. Posterior tibial tendon dysfunction (PTTD) of right lower extremity  -     COVID PRE-OP / PRE-PROCEDURE SCREENING ORDER (NO ISOLATION) - Swab, Nasopharynx; Future  -     Case Request; Standing  -     Instructions on coughing, deep breathing, and incentive spirometry.; Future  -     CBC & Differential; Future  -     Basic Metabolic Panel; Future  -     ECG 12 Lead; Future  -     Pregnancy, Urine - Urine, Clean Catch; Future  -     XR chest 2 vw; Future  -     Case Request  -     MRI Foot Right Without Contrast; Future    3. Bunion, right foot  -     COVID PRE-OP / PRE-PROCEDURE SCREENING ORDER (NO ISOLATION) - Swab, Nasopharynx; Future  -     Case Request; Standing  -     Instructions on coughing, deep breathing, and incentive spirometry.; Future  -     CBC & Differential; Future  -     Basic Metabolic Panel; Future  -     ECG 12 Lead; Future  -     Pregnancy, Urine - Urine, Clean Catch; Future  -     XR chest 2 vw; Future  -     Case Request    4. Foot pain, bilateral    5. Foot drop, left    Other orders  -     Follow Anesthesia Guidelines / Protocol; Future  -     Obtain Informed Consent; Future  -     Provide Instructions to Patient Regarding NPO Status; Future  -     Chlorhexidine Skin Prep - Educate and Review With Patient; Future  -     Provide NPO Instructions to Patient      Comprehensive lower extremity examination and evaluation was performed.  Discussed findings and treatment plan including risks, benefits, and treatment options with patient in detail. Patient agreed with treatment plan.  Again reviewed x-rays with patient.   MRI ordered for further evaluation.  Patient  wishes to forego additional conservative therapy and proceed with surgical intervention.  Patient wishes to proceed with joint sparing procedures if possible.   Patient will be initially scheduled for gastrocnemius recession versus Achilles tendon lengthening, Schmidt calcaneal osteotomy, cotton osteotomy, Emil bunionectomy, possible tibial bone graft harvesting, and possible posterior tibial tendon repair/augmentation.  All options, benefits, and risks associate with surgery were discussed with the patient including but not limited to: Standard risk of anesthesia, pain, bleeding, infection, nonhealing/dehiscence, nonunion, deformity, loss of limb or life.  Pre and postoperative courses were discussed in detail including minimum 6 to 8 weeks nonweightbearing status postoperatively.  No guarantees were inferred.  Patient will tentatively be scheduled for postoperative admission for pain control.  An After Visit Summary was printed and given to the patient at discharge, including (if requested) any available informative/educational handouts regarding diagnosis, treatment, or medications. All questions were answered to patient/family satisfaction. Should symptoms fail to improve or worsen they agree to call or return to clinic or to go to the Emergency Department. Discussed the importance of following up with any needed screening tests/labs/specialist appointments and any requested follow-up recommended by me today. Importance of maintaining follow-up discussed and patient accepts that missed appointments can delay diagnosis and potentially lead to worsening of conditions.  Return for Post-Op appointment., or sooner if acute issues arise.        This document has been electronically signed by Jerry Adrian DPM on June 2, 2022 17:16 CDT

## 2022-06-15 ENCOUNTER — OFFICE VISIT (OUTPATIENT)
Dept: GASTROENTEROLOGY | Facility: CLINIC | Age: 37
End: 2022-06-15

## 2022-06-15 VITALS
OXYGEN SATURATION: 98 % | HEART RATE: 119 BPM | TEMPERATURE: 96.9 F | BODY MASS INDEX: 43.87 KG/M2 | HEIGHT: 64 IN | DIASTOLIC BLOOD PRESSURE: 80 MMHG | SYSTOLIC BLOOD PRESSURE: 132 MMHG | WEIGHT: 257 LBS

## 2022-06-15 DIAGNOSIS — K62.5 BRBPR (BRIGHT RED BLOOD PER RECTUM): Primary | ICD-10-CM

## 2022-06-15 DIAGNOSIS — Z78.9 NONSMOKER: ICD-10-CM

## 2022-06-15 DIAGNOSIS — E66.9 OBESITY, UNSPECIFIED OBESITY SEVERITY, UNSPECIFIED OBESITY TYPE: ICD-10-CM

## 2022-06-15 DIAGNOSIS — K64.8 OTHER HEMORRHOIDS: ICD-10-CM

## 2022-06-15 PROCEDURE — 99203 OFFICE O/P NEW LOW 30 MIN: CPT | Performed by: CLINICAL NURSE SPECIALIST

## 2022-06-15 NOTE — PROGRESS NOTES
Nicole Ibarra  1985    6/15/2022  Chief Complaint   Patient presents with   • GI Problem     Hemorrhoids     Subjective   HPI  Nicole Ibarra is a 37 y.o. female who presents with a complaint of rectal pain what is thought to be hemorrhoids. She says it is hard and comes out on occasion. She is eating fiber and drinking water. She is taking pain medication due to chronic back pain and recent surgeries for this. She is not straining or having hard BMS but they do take a while to defecate. She has been having some BRBPR as well small amount on the tissue. No fever chills or sweats. No wt loss.    No family hx of colon cancer.   Past Medical History:   Diagnosis Date   • ADHD (attention deficit hyperactivity disorder)     pt states as a child   • DDD (degenerative disc disease), lumbar    • Foot drop, left foot    • Herniation of intervertebral disc between L5 and S1    • History of back surgery     a lift and fusion   • Irritable bowel syndrome    • Lumbosacral radiculopathy 10/16/2020   • Sciatic nerve pain, left      Past Surgical History:   Procedure Laterality Date   • APPENDECTOMY     • COLONOSCOPY  07/14/2016    Hemorrhoids otherwise normal exam repeat in 10 years   • ENDOSCOPY  07/14/2016    LA Grade A reflux esophagitis   • LUMBAR DISCECTOMY Left 10/16/2020    Procedure: LEFT L5-S1 MICRODISCECTOMY;  Surgeon: RUBIA Min MD;  Location: [facility] OR;  Service: Orthopedic Spine;  Laterality: Left;       Outpatient Medications Marked as Taking for the 6/15/22 encounter (Office Visit) with Karime Rodriguez APRN   Medication Sig Dispense Refill   • amitriptyline (ELAVIL) 100 MG tablet Take 100 mg by mouth Every Night.     • buPROPion (WELLBUTRIN) 100 MG tablet Take 100 mg by mouth 2 (Two) Times a Day.     • calcium carbonate (OS-MARCUS) 600 MG tablet Take 600 mg by mouth Every Night.     • Cholecalciferol (vitamin D3) 125 MCG (5000 UT) capsule capsule Take 5,000 Units by mouth Daily.     • cyclobenzaprine  (FLEXERIL) 10 MG tablet Take 1 tablet by mouth 2 (Two) Times a Day As Needed for Muscle Spasms. (Patient taking differently: Take 10 mg by mouth 3 (Three) Times a Day As Needed for Muscle Spasms.) 15 tablet 0   • docusate sodium (COLACE) 100 MG capsule Take 100 mg by mouth Every Night.     • Ergocalciferol (VITAMIN D2 PO) Take 50,000 Units by mouth 1 (One) Time Per Week.     • gabapentin (NEURONTIN) 600 MG tablet Take 600 mg by mouth 3 (Three) Times a Day.     • sertraline (ZOLOFT) 100 MG tablet Take 100 mg by mouth Daily.     • traMADol (ULTRAM) 50 MG tablet Take 50 mg by mouth Every 6 (Six) Hours As Needed for Moderate Pain .       Allergies   Allergen Reactions   • Doxycycline Anaphylaxis     ALL CYCLINES    • Latex Rash     Social History     Socioeconomic History   • Marital status:    Tobacco Use   • Smoking status: Former Smoker     Packs/day: 0.50     Years: 22.00     Pack years: 11.00     Types: Cigarettes     Quit date: 10/1/2020     Years since quittin.7   • Smokeless tobacco: Never Used   Vaping Use   • Vaping Use: Former   • Substances: Nicotine   • Devices: Disposable, Pre-filled or refillable cartridge   • Passive vaping exposure: Yes   Substance and Sexual Activity   • Alcohol use: Yes     Comment: occ 3 to 4 glasses of wine a week   • Drug use: No   • Sexual activity: Yes     Partners: Male     Family History   Problem Relation Age of Onset   • No Known Problems Mother    • COPD Father    • Heart disease Father    • Diabetes Father    • Heart failure Father    • Atrial fibrillation Father    • Colon cancer Neg Hx    • Colon polyps Neg Hx      Health Maintenance   Topic Date Due   • ANNUAL PHYSICAL  Never done   • TDAP/TD VACCINES (1 - Tdap) Never done   • PAP SMEAR  Never done   • COVID-19 Vaccine (3 - Booster for Pfizer series) 2022   • INFLUENZA VACCINE  10/01/2022   • HEPATITIS C SCREENING  Completed   • Pneumococcal Vaccine 0-64  Aged Out     Review of Systems   Constitutional:  "Negative for activity change, appetite change, chills, diaphoresis, fatigue, fever and unexpected weight change.   HENT: Negative for ear pain, hearing loss, mouth sores, sore throat, trouble swallowing and voice change.    Eyes: Negative.    Respiratory: Negative for cough, choking, shortness of breath and wheezing.    Cardiovascular: Negative for chest pain and palpitations.   Gastrointestinal: Positive for anal bleeding, blood in stool and rectal pain. Negative for abdominal pain, constipation, diarrhea, nausea and vomiting.   Endocrine: Negative for cold intolerance and heat intolerance.   Genitourinary: Negative for decreased urine volume, dysuria, frequency, hematuria and urgency.   Musculoskeletal: Negative for back pain, gait problem and myalgias.   Skin: Negative for color change, pallor and rash.   Allergic/Immunologic: Negative for food allergies and immunocompromised state.   Neurological: Negative for dizziness, tremors, seizures, syncope, weakness, light-headedness, numbness and headaches.   Hematological: Negative for adenopathy. Does not bruise/bleed easily.   Psychiatric/Behavioral: Negative for agitation and confusion. The patient is not nervous/anxious.    All other systems reviewed and are negative.    Objective   Vitals:    06/15/22 0942   BP: 132/80   Pulse: 119   Temp: 96.9 °F (36.1 °C)   SpO2: 98%   Weight: 117 kg (257 lb)   Height: 162.6 cm (64\")     Body mass index is 44.11 kg/m².  Physical Exam  Constitutional:       Appearance: She is well-developed.   HENT:      Head: Normocephalic and atraumatic.   Eyes:      Pupils: Pupils are equal, round, and reactive to light.   Neck:      Trachea: No tracheal deviation.   Cardiovascular:      Rate and Rhythm: Normal rate and regular rhythm.      Heart sounds: Normal heart sounds. No murmur heard.    No friction rub. No gallop.   Pulmonary:      Effort: Pulmonary effort is normal. No respiratory distress.      Breath sounds: Normal breath sounds. No " wheezing or rales.   Chest:      Chest wall: No tenderness.   Abdominal:      General: Bowel sounds are normal. There is no distension.      Palpations: Abdomen is soft. Abdomen is not rigid.      Tenderness: There is no abdominal tenderness. There is no guarding or rebound.   Genitourinary:     Comments: No hemorrhoids noted external, do suspect some internal no mass appreciated  Musculoskeletal:         General: No tenderness or deformity. Normal range of motion.      Cervical back: Normal range of motion and neck supple.   Skin:     General: Skin is warm and dry.      Coloration: Skin is not pale.      Findings: No rash.   Neurological:      Mental Status: She is alert and oriented to person, place, and time.      Deep Tendon Reflexes: Reflexes are normal and symmetric.   Psychiatric:         Behavior: Behavior normal.         Thought Content: Thought content normal.         Judgment: Judgment normal.       Assessment & Plan   Diagnoses and all orders for this visit:    1. BRBPR (bright red blood per rectum) (Primary)  -     polyethylene glycol (GoLYTELY) 236 g solution; Take as directed by office instructions.  Dispense: 4000 mL; Refill: 0  -     Cancel: Case Request; Standing  -     Cancel: Follow Anesthesia Guidelines / Standing Orders; Future  -     Cancel: Obtain Informed Consent; Future  -     Cancel: Case Request  -     Case Request; Standing  -     Follow Anesthesia Guidelines / Standing Orders; Future  -     Obtain Informed Consent; Future    2. Other hemorrhoids    3. Obesity, unspecified obesity severity, unspecified obesity type    4. Nonsmoker    Continue fiber and increase in water intake.   I suspect possible internal hemorrhoids and I have suggested Recticare  Try to keep stools soft and no straining due to narcotic use  Rectal exam was unremarkable other than suspected internal hemorrhoid  COLONOSCOPY WITH ANESTHESIA (N/A)  Part of this note may be an electronic transcription/translation of  spoken language to printed text using the Dragon Dictation System.  Body mass index is 44.11 kg/m².  No follow-ups on file.    Class 3 Severe Obesity (BMI >=40). Obesity-related health conditions include the following: none. Obesity is unchanged. BMI is is above average; BMI management plan is completed. We discussed portion control and increasing exercise.      All risks, benefits, alternatives, and indications of colonoscopy and/or Endoscopy procedure have been discussed with the patient. Risks to include perforation of the colon requiring possible surgery or colostomy, risk of bleeding from biopsies or removal of colon tissue, possibility of missing a colon polyp or cancer, or adverse drug reaction.  Benefits to include the diagnosis and management of disease of the colon and rectum. Alternatives to include barium enema, radiographic evaluation, lab testing or no intervention. Pt verbalizes understanding and agrees.     Karime Rodriguez, APRN  6/15/2022  10:55 CDT          If you smoke or use tobacco, 4 minutes reading provided  Steps to Quit Smoking  Smoking tobacco can be harmful to your health and can affect almost every organ in your body. Smoking puts you, and those around you, at risk for developing many serious chronic diseases. Quitting smoking is difficult, but it is one of the best things that you can do for your health. It is never too late to quit.  What are the benefits of quitting smoking?  When you quit smoking, you lower your risk of developing serious diseases and conditions, such as:  · Lung cancer or lung disease, such as COPD.  · Heart disease.  · Stroke.  · Heart attack.  · Infertility.  · Osteoporosis and bone fractures.  Additionally, symptoms such as coughing, wheezing, and shortness of breath may get better when you quit. You may also find that you get sick less often because your body is stronger at fighting off colds and infections. If you are pregnant, quitting smoking can help to  reduce your chances of having a baby of low birth weight.  How do I get ready to quit?  When you decide to quit smoking, create a plan to make sure that you are successful. Before you quit:  · Pick a date to quit. Set a date within the next two weeks to give you time to prepare.  · Write down the reasons why you are quitting. Keep this list in places where you will see it often, such as on your bathroom mirror or in your car or wallet.  · Identify the people, places, things, and activities that make you want to smoke (triggers) and avoid them. Make sure to take these actions:  ¨ Throw away all cigarettes at home, at work, and in your car.  ¨ Throw away smoking accessories, such as ashtrays and lighters.  ¨ Clean your car and make sure to empty the ashtray.  ¨ Clean your home, including curtains and carpets.  · Tell your family, friends, and coworkers that you are quitting. Support from your loved ones can make quitting easier.  · Talk with your health care provider about your options for quitting smoking.  · Find out what treatment options are covered by your health insurance.  What strategies can I use to quit smoking?  Talk with your healthcare provider about different strategies to quit smoking. Some strategies include:  · Quitting smoking altogether instead of gradually lessening how much you smoke over a period of time. Research shows that quitting “cold turkey” is more successful than gradually quitting.  · Attending in-person counseling to help you build problem-solving skills. You are more likely to have success in quitting if you attend several counseling sessions. Even short sessions of 10 minutes can be effective.  · Finding resources and support systems that can help you to quit smoking and remain smoke-free after you quit. These resources are most helpful when you use them often. They can include:  ¨ Online chats with a counselor.  ¨ Telephone quitlines.  ¨ Printed self-help materials.  ¨ Support groups  or group counseling.  ¨ Text messaging programs.  ¨ Mobile phone applications.  · Taking medicines to help you quit smoking. (If you are pregnant or breastfeeding, talk with your health care provider first.) Some medicines contain nicotine and some do not. Both types of medicines help with cravings, but the medicines that include nicotine help to relieve withdrawal symptoms. Your health care provider may recommend:  ¨ Nicotine patches, gum, or lozenges.  ¨ Nicotine inhalers or sprays.  ¨ Non-nicotine medicine that is taken by mouth.  Talk with your health care provider about combining strategies, such as taking medicines while you are also receiving in-person counseling. Using these two strategies together makes you more likely to succeed in quitting than if you used either strategy on its own.  If you are pregnant or breastfeeding, talk with your health care provider about finding counseling or other support strategies to quit smoking. Do not take medicine to help you quit smoking unless told to do so by your health care provider.  What things can I do to make it easier to quit?  Quitting smoking might feel overwhelming at first, but there is a lot that you can do to make it easier. Take these important actions:  · Reach out to your family and friends and ask that they support and encourage you during this time. Call telephone quitlines, reach out to support groups, or work with a counselor for support.  · Ask people who smoke to avoid smoking around you.  · Avoid places that trigger you to smoke, such as bars, parties, or smoke-break areas at work.  · Spend time around people who do not smoke.  · Lessen stress in your life, because stress can be a smoking trigger for some people. To lessen stress, try:  ¨ Exercising regularly.  ¨ Deep-breathing exercises.  ¨ Yoga.  ¨ Meditating.  ¨ Performing a body scan. This involves closing your eyes, scanning your body from head to toe, and noticing which parts of your body  are particularly tense. Purposefully relax the muscles in those areas.  · Download or purchase mobile phone or tablet apps (applications) that can help you stick to your quit plan by providing reminders, tips, and encouragement. There are many free apps, such as QuitGuide from the CDC (Centers for Disease Control and Prevention). You can find other support for quitting smoking (smoking cessation) through smokefree.gov and other websites.  How will I feel when I quit smoking?  Within the first 24 hours of quitting smoking, you may start to feel some withdrawal symptoms. These symptoms are usually most noticeable 2-3 days after quitting, but they usually do not last beyond 2-3 weeks. Changes or symptoms that you might experience include:  · Mood swings.  · Restlessness, anxiety, or irritation.  · Difficulty concentrating.  · Dizziness.  · Strong cravings for sugary foods in addition to nicotine.  · Mild weight gain.  · Constipation.  · Nausea.  · Coughing or a sore throat.  · Changes in how your medicines work in your body.  · A depressed mood.  · Difficulty sleeping (insomnia).  After the first 2-3 weeks of quitting, you may start to notice more positive results, such as:  · Improved sense of smell and taste.  · Decreased coughing and sore throat.  · Slower heart rate.  · Lower blood pressure.  · Clearer skin.  · The ability to breathe more easily.  · Fewer sick days.  Quitting smoking is very challenging for most people. Do not get discouraged if you are not successful the first time. Some people need to make many attempts to quit before they achieve long-term success. Do your best to stick to your quit plan, and talk with your health care provider if you have any questions or concerns.  This information is not intended to replace advice given to you by your health care provider. Make sure you discuss any questions you have with your health care provider.  Document Released: 12/12/2002 Document Revised: 08/15/2017  Document Reviewed: 05/03/2016  [employer] Interactive Patient Education © 2017 [employer]

## 2022-06-16 PROBLEM — M21.611 BUNION, RIGHT FOOT: Status: ACTIVE | Noted: 2022-06-16

## 2022-06-16 PROBLEM — M76.821 POSTERIOR TIBIAL TENDON DYSFUNCTION (PTTD) OF RIGHT LOWER EXTREMITY: Status: ACTIVE | Noted: 2022-06-16

## 2022-06-17 PROBLEM — K62.5 BRBPR (BRIGHT RED BLOOD PER RECTUM): Status: ACTIVE | Noted: 2022-06-17

## 2022-06-29 ENCOUNTER — TELEPHONE (OUTPATIENT)
Dept: GASTROENTEROLOGY | Facility: CLINIC | Age: 37
End: 2022-06-29

## 2022-07-07 ENCOUNTER — OFFICE VISIT (OUTPATIENT)
Dept: BARIATRICS/WEIGHT MGMT | Facility: CLINIC | Age: 37
End: 2022-07-07

## 2022-07-07 VITALS
HEART RATE: 107 BPM | TEMPERATURE: 98.8 F | DIASTOLIC BLOOD PRESSURE: 76 MMHG | BODY MASS INDEX: 45.07 KG/M2 | OXYGEN SATURATION: 96 % | HEIGHT: 64 IN | SYSTOLIC BLOOD PRESSURE: 116 MMHG | WEIGHT: 264 LBS

## 2022-07-07 DIAGNOSIS — E66.01 CLASS 3 SEVERE OBESITY DUE TO EXCESS CALORIES WITH SERIOUS COMORBIDITY AND BODY MASS INDEX (BMI) OF 45.0 TO 49.9 IN ADULT: Primary | ICD-10-CM

## 2022-07-07 DIAGNOSIS — M51.36 DDD (DEGENERATIVE DISC DISEASE), LUMBAR: ICD-10-CM

## 2022-07-07 PROCEDURE — 99214 OFFICE O/P EST MOD 30 MIN: CPT | Performed by: NURSE PRACTITIONER

## 2022-07-07 RX ORDER — BREXPIPRAZOLE 1 MG/1
TABLET ORAL
COMMUNITY
Start: 2022-06-09 | End: 2022-08-11

## 2022-07-07 RX ORDER — HYDROCODONE BITARTRATE AND ACETAMINOPHEN 5; 325 MG/1; MG/1
1 TABLET ORAL EVERY 8 HOURS PRN
COMMUNITY

## 2022-07-07 NOTE — PROGRESS NOTES
"Metabolic and Bariatric Surgery Adult Nutrition Assessment    Patient Name: Nicole AGUILAR   YOB: 1985   MRN: [id number]     Assessment Date:  07/07/2022     Reason for Visit: Initial Nutrition Assessment     Treatment Pathway: Preoperative Bariatric Surgery, Visit 1    Assessment    Anthropometrics   Wt Readings from Last 1 Encounters:   07/07/22 120 kg (264 lb)     Ht Readings from Last 1 Encounters:   07/07/22 162.6 cm (64\")     BMI Readings from Last 1 Encounters:   07/07/22 45.32 kg/m²        Initial Weight/Date: 264 lbs (7/7/22)  Weight Changes since last visit: n/a  Net Weight Change: n/a    Past Medical History:   Diagnosis Date   • ADHD (attention deficit hyperactivity disorder)     pt states as a child   • DDD (degenerative disc disease), lumbar    • Foot drop, left foot    • Herniation of intervertebral disc between L5 and S1    • History of back surgery     a lift and fusion   • Irritable bowel syndrome    • Lumbosacral radiculopathy 10/16/2020   • Sciatic nerve pain, left       Past Surgical History:   Procedure Laterality Date   • APPENDECTOMY     • COLONOSCOPY  07/14/2016    Hemorrhoids otherwise normal exam repeat in 10 years   • ENDOSCOPY  07/14/2016    LA Grade A reflux esophagitis   • LUMBAR DISCECTOMY Left 10/16/2020    Procedure: LEFT L5-S1 MICRODISCECTOMY;  Surgeon: RUBIA Min MD;  Location: [facility];  Service: Orthopedic Spine;  Laterality: Left;      Current Outpatient Medications   Medication Sig Dispense Refill   • amitriptyline (ELAVIL) 100 MG tablet Take 100 mg by mouth Every Night.     • buPROPion (WELLBUTRIN) 100 MG tablet Take 100 mg by mouth 2 (Two) Times a Day.     • calcium carbonate (OS-MARCUS) 600 MG tablet Take 600 mg by mouth Every Night.     • Cholecalciferol (vitamin D3) 125 MCG (5000 UT) capsule capsule Take 5,000 Units by mouth Daily.     • cyclobenzaprine (FLEXERIL) 10 MG tablet Take 1 tablet by mouth 2 (Two) Times a Day As Needed for Muscle Spasms. " (Patient taking differently: Take 10 mg by mouth 3 (Three) Times a Day As Needed for Muscle Spasms.) 15 tablet 0   • DICLOFENAC PO Take 75 mg by mouth 2 (Two) Times a Day.     • docusate sodium (COLACE) 100 MG capsule Take 100 mg by mouth Every Night.     • Ergocalciferol (VITAMIN D2 PO) Take 50,000 Units by mouth 1 (One) Time Per Week.     • gabapentin (NEURONTIN) 600 MG tablet Take 600 mg by mouth 3 (Three) Times a Day.     • HYDROcodone-acetaminophen (NORCO) 5-325 MG per tablet Take 1 tablet by mouth Every 6 (Six) Hours As Needed.     • polyethylene glycol (GoLYTELY) 236 g solution Take as directed by office instructions. 4000 mL 0   • Rexulti 1 MG tablet      • sertraline (ZOLOFT) 100 MG tablet Take 100 mg by mouth Daily.     • traMADol (ULTRAM) 50 MG tablet Take 50 mg by mouth Every 6 (Six) Hours As Needed for Moderate Pain .       No current facility-administered medications for this visit.      Allergies   Allergen Reactions   • Doxycycline Anaphylaxis     ALL CYCLINES    • Latex Rash          Motivation for weight loss includes: Reduce pain (gained 90 lbs in 2 yrs).     Pertinent Social/Behavior/Environmental History: Not currently working. Family is on limited budget.    Nutrition Recall  Eating 2-3 meals daily. Quick prepare foods. Lots of cereals with milk. Oatmeal (without added sugar)  (M1) Coffee with skinny syrup and whole milk.   (M2) ~lunch time. Cereal/oatmeal.   (M3) occasionally have a family meal. Grilled meat and vegetables. Sometimes yogurt (Greek zero fat)/cereal/oatmeal. Last night bread with olive oil/balamic vinegar/spices.   (M4) n/a  Snacking - no  Monitoring portions- no  Calculating Protein- no  Drinking sugary/carbonated beverages- No carbonation (occassonally whiskey&Coke). No added sugars.   Fluid Intake- dry mouth/sweating drinks at least 8 bottles of water daily. Drinks 3 large coffee mugs of coffee daily.   Alcohol intake- ~3/wk. Eleva/whiskey/    Success this Month: recently  has lost 15 lbs, being more mindful of foods and reducing added sugars. Reduced alcohol.   Barriers: Food preparation is difficult. Loves italian bread with olive oil and basalmic vinegar. Chocolate Ganash ice cream.     Exercise: Physical therapy on own- aquatic therapy on own.       Nutrition Intervention  Nutrition education and nutrition coaching for behavior change provided.  Strategies used included Comprehensive education, Motivational Interviewing , Problem Solving, Skill Development for meal planning , Ongoing reinforcement, Provided sample menus and Provided samples of protein supplement   Review of medical weight loss prescription 4 meal/day plan and reviewed nutritional needs for Preoperative Bariatric Surgery, Visit 1.  Self-monitoring strategies such as keeping a food journal (on paper or electronically) and calculating fluid/protein intake were discussed.    Recommended Diet Changes  Eat 4 meals per day with protein and vegetables at each meal, no carbs after meal 2., Protein goal: 65 gms., Eat vegetables first at each meal., Discussed protein guidelines for shakes and bars., Reduce snacking -use foods from free foods list only., Eliminate snacks., Reduce fat, sugar, and/or salt in food choices., Choose more nutrient dense foods., Choose foods with increased fiber., Monitor portion sizes using a food scale and/or measuring cup., Eliminate soda and sugar-sweetened beverages and Increase fluid intake to 64 ounces per day      Goals  1. Reduce coffee mug to twice daily. Continue to reduce amount overtime.  2. Have four meal times daily.   3. Refer to meal plan prior to every meal.     Monitoring/Evaluation Plan  Anticipate follow up per program protocol. Continue collaboration of care with physician and treatment team.     Electronically signed by  Jeannie Harris RDN, LD  07/07/2022 11:28 CDT.

## 2022-07-07 NOTE — PROGRESS NOTES
Patient Care Team:  Anthony Lawler DO as PCP - General (Internal Medicine)  Janes Darby MD as Consulting Physician (Gastroenterology)      Subjective     Patient is a 37 y.o. female presents with morbid obesity and her Body mass index is 45.32 kg/m².     She is here for discussion of surgical weight loss options.  She stated she has been with the disease of obesity for year(s).  She stated she suffers from GERD, fatigue and morbid obesity due to her weight gain.  She stated that weight loss helps alleviate these symptoms.   She stated that she has tried other diet regimens such as Atkins and high protein to help with weight loss.  She stated that she has attempted these conservative methods for weight loss without maintaining long term success.  Today she would like to discuss surgical weight loss options such as the Laparoscopic Sleeve Gastrectomy or the Laparoscopic R - Y Gastric Bypass.   She states she has lost 90 lbs in 2 years. She states she has worked to cut out a lot of added sugar. She states being disabled and not moving much has caused a significant amount of weight gain.       Review of Systems   Constitutional: Positive for fatigue.   Respiratory: Negative.    Cardiovascular: Negative.    Gastrointestinal: Positive for constipation.   Endocrine: Negative.    Musculoskeletal: Positive for arthralgias, back pain and myalgias.   Psychiatric/Behavioral: Positive for sleep disturbance and depressed mood. The patient is nervous/anxious.         History  Past Medical History:   Diagnosis Date   • ADHD (attention deficit hyperactivity disorder)     pt states as a child   • DDD (degenerative disc disease), lumbar    • Foot drop, left foot    • Herniation of intervertebral disc between L5 and S1    • History of back surgery     a lift and fusion   • Irritable bowel syndrome    • Lumbosacral radiculopathy 10/16/2020   • Sciatic nerve pain, left       Past Surgical History:   Procedure Laterality Date    • APPENDECTOMY     • COLONOSCOPY  2016    Hemorrhoids otherwise normal exam repeat in 10 years   • ENDOSCOPY  2016    LA Grade A reflux esophagitis   • LUMBAR DISCECTOMY Left 10/16/2020    Procedure: LEFT L5-S1 MICRODISCECTOMY;  Surgeon: RUBIA Min MD;  Location: [facility];  Service: Orthopedic Spine;  Laterality: Left;      Social History     Socioeconomic History   • Marital status:    Tobacco Use   • Smoking status: Former Smoker     Packs/day: 0.50     Years: 22.00     Pack years: 11.00     Types: Cigarettes     Quit date: 10/1/2020     Years since quittin.7   • Smokeless tobacco: Never Used   Vaping Use   • Vaping Use: Former   • Substances: Nicotine   • Devices: Disposable, Pre-filled or refillable cartridge   • Passive vaping exposure: Yes   Substance and Sexual Activity   • Alcohol use: Yes     Comment: occ 3 to 4 glasses of wine a week   • Drug use: No   • Sexual activity: Yes     Partners: Male      Family History   Problem Relation Age of Onset   • No Known Problems Mother    • COPD Father    • Heart disease Father    • Diabetes Father    • Heart failure Father    • Atrial fibrillation Father    • Colon cancer Neg Hx    • Colon polyps Neg Hx       Allergies   Allergen Reactions   • Doxycycline Anaphylaxis     ALL CYCLINES    • Latex Rash          Current Outpatient Medications:   •  amitriptyline (ELAVIL) 100 MG tablet, Take 100 mg by mouth Every Night., Disp: , Rfl:   •  buPROPion (WELLBUTRIN) 100 MG tablet, Take 100 mg by mouth 2 (Two) Times a Day., Disp: , Rfl:   •  Cholecalciferol (vitamin D3) 125 MCG (5000 UT) capsule capsule, Take 5,000 Units by mouth Daily., Disp: , Rfl:   •  cyclobenzaprine (FLEXERIL) 10 MG tablet, Take 1 tablet by mouth 2 (Two) Times a Day As Needed for Muscle Spasms. (Patient taking differently: Take 10 mg by mouth 3 (Three) Times a Day As Needed for Muscle Spasms.), Disp: 15 tablet, Rfl: 0  •  DICLOFENAC PO, Take 75 mg by mouth 2 (Two) Times a  Day., Disp: , Rfl:   •  docusate sodium (COLACE) 100 MG capsule, Take 100 mg by mouth Every Night., Disp: , Rfl:   •  Ergocalciferol (VITAMIN D2 PO), Take 50,000 Units by mouth 1 (One) Time Per Week., Disp: , Rfl:   •  gabapentin (NEURONTIN) 600 MG tablet, Take 600 mg by mouth 3 (Three) Times a Day., Disp: , Rfl:   •  HYDROcodone-acetaminophen (NORCO) 5-325 MG per tablet, Take 1 tablet by mouth Every 6 (Six) Hours As Needed., Disp: , Rfl:   •  Rexulti 1 MG tablet, , Disp: , Rfl:   •  sertraline (ZOLOFT) 100 MG tablet, Take 100 mg by mouth Daily., Disp: , Rfl:   •  calcium carbonate (OS-MARCUS) 600 MG tablet, Take 600 mg by mouth Every Night., Disp: , Rfl:   •  polyethylene glycol (GoLYTELY) 236 g solution, Take as directed by office instructions., Disp: 4000 mL, Rfl: 0  •  traMADol (ULTRAM) 50 MG tablet, Take 50 mg by mouth Every 6 (Six) Hours As Needed for Moderate Pain ., Disp: , Rfl:     Objective     Vital Signs     Body mass index is 45.32 kg/m².      07/07/22  1044   Weight: 120 kg (264 lb)       Physical Exam  Vitals reviewed.   Constitutional:       Appearance: She is obese.      Comments: Using cane    Cardiovascular:      Rate and Rhythm: Normal rate and regular rhythm.   Pulmonary:      Effort: Pulmonary effort is normal.   Abdominal:      General: Bowel sounds are normal.      Palpations: Abdomen is soft.   Musculoskeletal:         General: Normal range of motion.   Skin:     General: Skin is warm and dry.   Neurological:      Mental Status: She is alert and oriented to person, place, and time.   Psychiatric:         Mood and Affect: Mood normal.         Behavior: Behavior normal.            Results Review:   I reviewed the patient's new clinical results.      Class 3 Severe Obesity (BMI >=40). Obesity-related health conditions include the following: none. Obesity is unchanged. BMI is is above average; BMI management plan is completed. We discussed portion control and increasing exercise.      Assessment &  Plan   Diagnoses and all orders for this visit:    1. Class 3 severe obesity due to excess calories with serious comorbidity and body mass index (BMI) of 45.0 to 49.9 in adult (HCC) (Primary)  Assessment & Plan:  Patient's (Body mass index is 45.32 kg/m².) indicates that they are morbidly obese (BMI > 40 or > 35 with obesity - related health condition) with health conditions that include none . Weight is improving with treatment. BMI is is above average; BMI management plan is completed. We discussed portion control and increasing exercise.       2. DDD (degenerative disc disease), lumbar        I discussed the patient's findings and my recommendations with patient.     I have also recommended that she obtain a cardiac risk assessment and psychiatric evaluation prior to surgery consideration.  She has been provided a structured dietary regimen based off of her behavior.  I discussed with the patient the etiology of the disease of obesity and the potential comorbid conditions associated with this disease.  She was instructed to follow the dietary regimen and follow-up with our program in 1 month's time with any additional questions as they may arise during this time.  We emphasized on focusing on proteins and meals high in fiber as well as adequate hydration that exceed 64 ounces of water daily.    I explained that I anticipate the patient to lose weight prior to her next monthly visit.  I encouraged patient to have a reward day once a month.  1. Today the patient  received the 4 meals/day diet prescription, which was explained to patient.  Patient will see the dietitian today to further discuss goals for diet, exercise, and lifestyle. Patient has received intensive behavioral therapy for obesity today. I explained the pathophysiology of the disease and its storage component. We also discussed Dr. Eric' pearls of the program. Nutrition counseling ordered today.       Suellen Cho, COLBY     07/13/22  10:40 CDT

## 2022-07-13 PROBLEM — E66.01 CLASS 3 SEVERE OBESITY DUE TO EXCESS CALORIES WITH SERIOUS COMORBIDITY AND BODY MASS INDEX (BMI) OF 45.0 TO 49.9 IN ADULT: Status: ACTIVE | Noted: 2018-04-06

## 2022-07-13 PROBLEM — E66.813 CLASS 3 SEVERE OBESITY DUE TO EXCESS CALORIES WITH SERIOUS COMORBIDITY AND BODY MASS INDEX (BMI) OF 45.0 TO 49.9 IN ADULT: Status: ACTIVE | Noted: 2018-04-06

## 2022-07-13 NOTE — ASSESSMENT & PLAN NOTE
Patient's (Body mass index is 45.32 kg/m².) indicates that they are morbidly obese (BMI > 40 or > 35 with obesity - related health condition) with health conditions that include none . Weight is improving with treatment. BMI is is above average; BMI management plan is completed. We discussed portion control and increasing exercise.

## 2022-08-11 ENCOUNTER — OFFICE VISIT (OUTPATIENT)
Dept: BARIATRICS/WEIGHT MGMT | Facility: CLINIC | Age: 37
End: 2022-08-11

## 2022-08-11 VITALS
HEIGHT: 64 IN | OXYGEN SATURATION: 96 % | DIASTOLIC BLOOD PRESSURE: 85 MMHG | HEART RATE: 121 BPM | SYSTOLIC BLOOD PRESSURE: 132 MMHG | TEMPERATURE: 97.8 F | WEIGHT: 254.4 LBS | BODY MASS INDEX: 43.43 KG/M2

## 2022-08-11 DIAGNOSIS — E66.01 CLASS 3 SEVERE OBESITY DUE TO EXCESS CALORIES WITH SERIOUS COMORBIDITY AND BODY MASS INDEX (BMI) OF 45.0 TO 49.9 IN ADULT: Primary | ICD-10-CM

## 2022-08-11 PROCEDURE — 99213 OFFICE O/P EST LOW 20 MIN: CPT | Performed by: NURSE PRACTITIONER

## 2022-08-11 NOTE — PROGRESS NOTES
"Metabolic and Bariatric Surgery Adult Nutrition Assessment    Patient Name: Nicole AGUILAR   YOB: 1985   MRN: [id number]     Assessment Date:  08/11/2022     Reason for Visit: Follow-up Nutrition Assessment     Treatment Pathway: Preoperative Bariatric Surgery, Visit 2    Assessment    Anthropometrics   Wt Readings from Last 1 Encounters:   08/11/22 115 kg (254 lb 6.4 oz)     Ht Readings from Last 1 Encounters:   08/11/22 162.6 cm (64\")     BMI Readings from Last 1 Encounters:   08/11/22 43.67 kg/m²        Initial Weight/Date: 264 lbs (7/7/22)  Weight Changes since last visit: -9.6 lbs  Net Weight Change: -9.6 lbs    Past Medical History:   Diagnosis Date   • ADHD (attention deficit hyperactivity disorder)     pt states as a child   • DDD (degenerative disc disease), lumbar    • Foot drop, left foot    • Herniation of intervertebral disc between L5 and S1    • History of back surgery     a lift and fusion   • Irritable bowel syndrome    • Lumbosacral radiculopathy 10/16/2020   • Sciatic nerve pain, left       Past Surgical History:   Procedure Laterality Date   • APPENDECTOMY     • COLONOSCOPY  07/14/2016    Hemorrhoids otherwise normal exam repeat in 10 years   • ENDOSCOPY  07/14/2016    LA Grade A reflux esophagitis   • LUMBAR DISCECTOMY Left 10/16/2020    Procedure: LEFT L5-S1 MICRODISCECTOMY;  Surgeon: RUBIA Min MD;  Location: [facility];  Service: Orthopedic Spine;  Laterality: Left;      Current Outpatient Medications   Medication Sig Dispense Refill   • amitriptyline (ELAVIL) 100 MG tablet Take 100 mg by mouth Every Night.     • buPROPion (WELLBUTRIN) 100 MG tablet Take 100 mg by mouth 2 (Two) Times a Day.     • calcium carbonate (OS-MARCUS) 600 MG tablet Take 600 mg by mouth Every Night.     • Cariprazine HCl (VRAYLAR) 1.5 MG capsule capsule      • Cholecalciferol (vitamin D3) 125 MCG (5000 UT) capsule capsule Take 5,000 Units by mouth Daily.     • cyclobenzaprine (FLEXERIL) 10 MG tablet " Take 1 tablet by mouth 2 (Two) Times a Day As Needed for Muscle Spasms. (Patient taking differently: Take 10 mg by mouth 3 (Three) Times a Day As Needed for Muscle Spasms.) 15 tablet 0   • DICLOFENAC PO Take 75 mg by mouth 2 (Two) Times a Day.     • docusate sodium (COLACE) 100 MG capsule Take 100 mg by mouth Every Night.     • Ergocalciferol (VITAMIN D2 PO) Take 50,000 Units by mouth 1 (One) Time Per Week.     • gabapentin (NEURONTIN) 600 MG tablet Take 600 mg by mouth 3 (Three) Times a Day.     • HYDROcodone-acetaminophen (NORCO) 5-325 MG per tablet Take 1 tablet by mouth Every 6 (Six) Hours As Needed.     • sertraline (ZOLOFT) 100 MG tablet Take 100 mg by mouth Daily.       No current facility-administered medications for this visit.      Allergies   Allergen Reactions   • Doxycycline Anaphylaxis     ALL CYCLINES    • Latex Rash          Motivation for weight loss includes: Reduce back pain    Pertinent Social/Behavior/Environmental History:  has been very supportive of changes this month.    Nutrition Recall  Eating 4 meals daily. Food recall reviewed.   Snacking - minimal to none.   Monitoring portions- Yes, sometimes.   Calculating Protein- reports 65+ grams daily, however hasn't been writing this down.   Drinking sugary/carbonated beverages- none  Fluid Intake- 64+ ounces daily.       Success this Month: Reduced intake of dairy milk- has switched to almond milk.   Barriers: Not always tracking intake via measuring cups; has been using fruit on yogurt in the afternoons. Struggles getting in meal 3.     Exercise: HEP, walking, and increased ADLs      Nutrition Intervention  Nutrition education and nutrition coaching for behavior change provided.  Strategies used included Motivational Interviewing , Problem Solving, Skill Development for meal planning and measuring.  and Ongoing reinforcement  Review of medical weight loss prescription 4 meal/day plan and reviewed nutritional needs for Preoperative  Bariatric Surgery, Visit 2.  Self-monitoring strategies such as keeping a food journal (on paper or electronically) and calculating fluid/protein intake were discussed.    Recommended Diet Changes  Eat 4 meals per day with protein and vegetables at each meal, no carbs after meal 2., Protein goal: 65 gms., Eat vegetables first at each meal., Discussed protein guidelines for shakes and bars., Reduce snacking -use foods from free foods list only., Reduce fat, sugar, and/or salt in food choices., Choose more nutrient dense foods., Choose foods with increased fiber., Monitor portion sizes using a food scale and/or measuring cup. and Increase fluid intake to 64 ounces per day      Goals  1. Track food intake this month via food log.  2. Utilize measuring cups to accurately measure all food portions.     Monitoring/Evaluation Plan  Anticipate follow up per program protocol. Continue collaboration of care with physician and treatment team.     Electronically signed by  Jeannie Harris RDN, LD  08/11/2022 15:18 CDT.

## 2022-09-12 ENCOUNTER — OFFICE VISIT (OUTPATIENT)
Dept: BARIATRICS/WEIGHT MGMT | Facility: CLINIC | Age: 37
End: 2022-09-12

## 2022-09-12 VITALS
HEART RATE: 99 BPM | BODY MASS INDEX: 43.71 KG/M2 | HEIGHT: 64 IN | WEIGHT: 256 LBS | DIASTOLIC BLOOD PRESSURE: 81 MMHG | TEMPERATURE: 98.7 F | OXYGEN SATURATION: 96 % | SYSTOLIC BLOOD PRESSURE: 128 MMHG

## 2022-09-12 DIAGNOSIS — E66.01 CLASS 3 SEVERE OBESITY DUE TO EXCESS CALORIES WITH SERIOUS COMORBIDITY AND BODY MASS INDEX (BMI) OF 45.0 TO 49.9 IN ADULT: Primary | ICD-10-CM

## 2022-09-12 DIAGNOSIS — K58.0 IRRITABLE BOWEL SYNDROME WITH DIARRHEA: ICD-10-CM

## 2022-09-12 DIAGNOSIS — F17.200 SMOKER: ICD-10-CM

## 2022-09-12 PROCEDURE — 99213 OFFICE O/P EST LOW 20 MIN: CPT | Performed by: SURGERY

## 2022-09-12 NOTE — PROGRESS NOTES
Patient Care Team:  Anthony Lawler DO as PCP - General (Internal Medicine)  Janes Darby MD as Consulting Physician (Gastroenterology)    Reason for Visit:  Surgical Weight loss      Subjective      Nicole AGUILAR is a pleasant 37 y.o. female and presents with morbid obesity with her Body mass index is 43.94 kg/m².    She is here for discussion of the upper endoscopic procedure.  She stated she has been with the disease of obesity for year(s).  She stated she suffers from irritable bowel, smoking and morbid obesity due to her weight gain.  She stated that weight loss helps alleviate these symptoms and she is trying to stop smoking.   She stated that she has tried multiple diet regimens to help with weight loss.  She stated that she has attempted these conservative methods for weight loss without maintaining long term success.  Today she and myself will discuss surgical weight loss options such as the Laparoscopic Sleeve Gastrectomy or the Laparoscopic R - Y Gastric Bypass.        Review of Systems  General ROS: positive for  - sleep disturbance and weight gain  Respiratory ROS: no cough, shortness of breath, or wheezing  Cardiovascular ROS: no chest pain or dyspnea on exertion  Gastrointestinal ROS: no abdominal pain, change in bowel habits, or black or bloody stools  positive for - constipation    History  Past Medical History:   Diagnosis Date   • ADHD (attention deficit hyperactivity disorder)     pt states as a child   • DDD (degenerative disc disease), lumbar    • Foot drop, left foot    • Herniation of intervertebral disc between L5 and S1    • History of back surgery     a lift and fusion   • Irritable bowel syndrome    • Lumbosacral radiculopathy 10/16/2020   • Sciatic nerve pain, left      Past Surgical History:   Procedure Laterality Date   • APPENDECTOMY     • COLONOSCOPY  07/14/2016    Hemorrhoids otherwise normal exam repeat in 10 years   • ENDOSCOPY  07/14/2016    LA Grade A reflux  esophagitis   • LUMBAR DISCECTOMY Left 10/16/2020    Procedure: LEFT L5-S1 MICRODISCECTOMY;  Surgeon: RUBIA Min MD;  Location: [facility] OR;  Service: Orthopedic Spine;  Laterality: Left;     Family History   Problem Relation Age of Onset   • No Known Problems Mother    • COPD Father    • Heart disease Father    • Diabetes Father    • Heart failure Father    • Atrial fibrillation Father    • Colon cancer Neg Hx    • Colon polyps Neg Hx      Social History     Tobacco Use   • Smoking status: Former Smoker     Packs/day: 0.50     Years: 22.00     Pack years: 11.00     Types: Cigarettes     Quit date: 10/1/2020     Years since quittin.9   • Smokeless tobacco: Never Used   Vaping Use   • Vaping Use: Former   • Substances: Nicotine   • Devices: Disposable, Pre-filled or refillable cartridge   • Passive vaping exposure: Yes   Substance Use Topics   • Alcohol use: Yes     Comment: occ 3 to 4 glasses of wine a week   • Drug use: No     E-cigarette/Vaping   • E-cigarette/Vaping Use Former User    • Passive Exposure Yes    • Counseling Given Yes      E-cigarette/Vaping Substances   • Nicotine Yes    • THC No    • CBD No    • Flavoring No      (Not in a hospital admission)    Allergies:  Doxycycline and Latex      Current Outpatient Medications:   •  amitriptyline (ELAVIL) 100 MG tablet, Take 100 mg by mouth Every Night., Disp: , Rfl:   •  buPROPion (WELLBUTRIN) 100 MG tablet, Take 100 mg by mouth 2 (Two) Times a Day., Disp: , Rfl:   •  calcium carbonate (OS-MARCUS) 600 MG tablet, Take 600 mg by mouth Every Night., Disp: , Rfl:   •  Cariprazine HCl (Vraylar) 3 MG capsule capsule, Take  by mouth Daily., Disp: , Rfl:   •  Cholecalciferol (vitamin D3) 125 MCG (5000 UT) capsule capsule, Take 5,000 Units by mouth Daily., Disp: , Rfl:   •  cyclobenzaprine (FLEXERIL) 10 MG tablet, Take 1 tablet by mouth 2 (Two) Times a Day As Needed for Muscle Spasms. (Patient taking differently: Take 10 mg by mouth 3 (Three) Times a Day As  Needed for Muscle Spasms.), Disp: 15 tablet, Rfl: 0  •  DICLOFENAC PO, Take 75 mg by mouth 2 (Two) Times a Day., Disp: , Rfl:   •  docusate sodium (COLACE) 100 MG capsule, Take 100 mg by mouth Every Night., Disp: , Rfl:   •  Ergocalciferol (VITAMIN D2 PO), Take 50,000 Units by mouth 1 (One) Time Per Week., Disp: , Rfl:   •  gabapentin (NEURONTIN) 600 MG tablet, Take 600 mg by mouth 3 (Three) Times a Day., Disp: , Rfl:   •  HYDROcodone-acetaminophen (NORCO) 5-325 MG per tablet, Take 1 tablet by mouth Every 6 (Six) Hours As Needed., Disp: , Rfl:   •  sertraline (ZOLOFT) 100 MG tablet, Take 100 mg by mouth Daily., Disp: , Rfl:     Objective     Vital Signs  Temp:  [98.7 °F (37.1 °C)] 98.7 °F (37.1 °C)  Heart Rate:  [99] 99  BP: (128)/(81) 128/81  Body mass index is 43.94 kg/m².      09/12/22  1337   Weight: 116 kg (256 lb)       Physical Exam:      HEENT: extra ocular movement intact and sclera clear, anicteric     Results Review:   None        Assessment & Plan   Encounter Diagnoses   Name Primary?   • Class 3 severe obesity due to excess calories with serious comorbidity and body mass index (BMI) of 45.0 to 49.9 in adult (HCC) Yes   • Irritable bowel syndrome with diarrhea    • Smoker            1.  I believe this patient will be a good candidate for weight loss surgery.  I have discussed the David - Y Gastric Bypass, laparoscopic sleeve gastrectomy and the Laparoscopic Gastric Band procedures.  We discussed the benefits of the surgeries including the benefit of weight loss and the possible reversal of co-morbid conditions associated with morbid obesity. I explained to the patient that prior to making a definitive decision on the type of surgery she will require an esophagogastroduodenoscopy with biopsies to assess for any contraindications for surgical weight loss.  I explained the alternatives  include not doing anything, or pursuing an UGI series which only offers a diagnosis with potential less accuracy compared  "to EGD, the benefits of the EGD such as identifying the pathology and anatomy of the upper GI system, and the risks and complications of the endoscopy were discussed in detail as well. I discussed the risk of perforation (one out of [id number], riskier with dilation), bleeding (one out of 500), and the rare risks of infection, adverse reaction to anesthesia, respiratory failure, cardiac failure including MI and adverse reaction to medications such as allergic reactions. We discussed consequences that could occur if a risk were to develop such as the need for hospitalization, blood transfusion, surgical intervention, medications, pain, disability and death. The patient verbalizes understanding and agrees to proceed. such as bleeding, perforation, swallowing difficulties and gas bloat can occur after this procedure.      Upon completion of our discussion and addressing and answering her questions to her satisfation.  The patient stated however if she is still pebbling the idea of surgery as an option for the treatment of her morbid obesity.  I explained to the patient this is fine and I reviewed the importance of following a dietary prescription as directed.  The patient admits at this time she is not a \"confident\" she will follow the recommendations required for her postoperative journey.  As result I have to agree that if she is not comfortable at this time she should not proceed with surgery.  I explained that when she is comfortable and her life stressors have decreased and she feels this though she is more confident with respect to compliance and behavior modification we can readdress surgery is a possibility.    2.  Smoking-the patient states she is stop smoking.           She will be scheduled accordingly for the esophagogastroduodenoscopy procedure.    I discussed the patients findings and my recommendations with patient.     Dr. Francisco Eric MD [facility]    09/12/22  14:52 CDT  Patient Care Team:  Anthony Lawler " DO Padilla as PCP - General (Internal Medicine)  Janes Darby MD as Consulting Physician (Gastroenterology)

## 2022-09-16 ENCOUNTER — TELEPHONE (OUTPATIENT)
Dept: PODIATRY | Facility: CLINIC | Age: 37
End: 2022-09-16

## 2022-09-16 NOTE — TELEPHONE ENCOUNTER
Would like a script for a knee scooter please, her insurance told her it would cover any taxes on the rental of one and she would like that covered

## 2022-09-19 ENCOUNTER — HOSPITAL ENCOUNTER (OUTPATIENT)
Dept: GENERAL RADIOLOGY | Facility: HOSPITAL | Age: 37
Discharge: HOME | End: 2022-09-19

## 2022-09-19 ENCOUNTER — PRE-ADMISSION TESTING (OUTPATIENT)
Dept: PREADMISSION TESTING | Facility: HOSPITAL | Age: 37
End: 2022-09-19

## 2022-09-19 VITALS
DIASTOLIC BLOOD PRESSURE: 105 MMHG | OXYGEN SATURATION: 97 % | SYSTOLIC BLOOD PRESSURE: 160 MMHG | HEART RATE: 108 BPM | WEIGHT: 253.31 LBS | HEIGHT: 65 IN | BODY MASS INDEX: 42.2 KG/M2 | RESPIRATION RATE: 20 BRPM

## 2022-09-19 DIAGNOSIS — M21.611 BUNION, RIGHT FOOT: ICD-10-CM

## 2022-09-19 DIAGNOSIS — M21.41 PES PLANUS OF BOTH FEET: ICD-10-CM

## 2022-09-19 DIAGNOSIS — M21.42 PES PLANUS OF BOTH FEET: ICD-10-CM

## 2022-09-19 DIAGNOSIS — M76.821 POSTERIOR TIBIAL TENDON DYSFUNCTION (PTTD) OF RIGHT LOWER EXTREMITY: ICD-10-CM

## 2022-09-19 LAB
ANION GAP SERPL CALCULATED.3IONS-SCNC: 9 MMOL/L (ref 5–15)
BASOPHILS # BLD AUTO: 0.06 10*3/MM3 (ref 0–0.2)
BASOPHILS NFR BLD AUTO: 0.7 % (ref 0–1.5)
BUN SERPL-MCNC: 11 MG/DL (ref 6–20)
BUN/CREAT SERPL: 13.6 (ref 7–25)
CALCIUM SPEC-SCNC: 8.9 MG/DL (ref 8.6–10.5)
CHLORIDE SERPL-SCNC: 105 MMOL/L (ref 98–107)
CO2 SERPL-SCNC: 25 MMOL/L (ref 22–29)
CREAT SERPL-MCNC: 0.81 MG/DL (ref 0.57–1)
DEPRECATED RDW RBC AUTO: 47.1 FL (ref 37–54)
EGFRCR SERPLBLD CKD-EPI 2021: 94.8 ML/MIN/1.73
EOSINOPHIL # BLD AUTO: 0.35 10*3/MM3 (ref 0–0.4)
EOSINOPHIL NFR BLD AUTO: 4.3 % (ref 0.3–6.2)
ERYTHROCYTE [DISTWIDTH] IN BLOOD BY AUTOMATED COUNT: 14.9 % (ref 12.3–15.4)
GLUCOSE SERPL-MCNC: 108 MG/DL (ref 65–99)
HCT VFR BLD AUTO: 39.6 % (ref 34–46.6)
HGB BLD-MCNC: 12.8 G/DL (ref 12–15.9)
IMM GRANULOCYTES # BLD AUTO: 0.01 10*3/MM3 (ref 0–0.05)
IMM GRANULOCYTES NFR BLD AUTO: 0.1 % (ref 0–0.5)
LYMPHOCYTES # BLD AUTO: 2.95 10*3/MM3 (ref 0.7–3.1)
LYMPHOCYTES NFR BLD AUTO: 36 % (ref 19.6–45.3)
MCH RBC QN AUTO: 27.8 PG (ref 26.6–33)
MCHC RBC AUTO-ENTMCNC: 32.3 G/DL (ref 31.5–35.7)
MCV RBC AUTO: 86.1 FL (ref 79–97)
MONOCYTES # BLD AUTO: 0.57 10*3/MM3 (ref 0.1–0.9)
MONOCYTES NFR BLD AUTO: 7 % (ref 5–12)
NEUTROPHILS NFR BLD AUTO: 4.25 10*3/MM3 (ref 1.7–7)
NEUTROPHILS NFR BLD AUTO: 51.9 % (ref 42.7–76)
NRBC BLD AUTO-RTO: 0 /100 WBC (ref 0–0.2)
PLATELET # BLD AUTO: 301 10*3/MM3 (ref 140–450)
PMV BLD AUTO: 9.4 FL (ref 6–12)
POTASSIUM SERPL-SCNC: 4.1 MMOL/L (ref 3.5–5.2)
RBC # BLD AUTO: 4.6 10*6/MM3 (ref 3.77–5.28)
SODIUM SERPL-SCNC: 139 MMOL/L (ref 136–145)
WBC NRBC COR # BLD: 8.19 10*3/MM3 (ref 3.4–10.8)

## 2022-09-19 PROCEDURE — 36415 COLL VENOUS BLD VENIPUNCTURE: CPT

## 2022-09-19 PROCEDURE — 80048 BASIC METABOLIC PNL TOTAL CA: CPT

## 2022-09-19 PROCEDURE — 93005 ELECTROCARDIOGRAM TRACING: CPT

## 2022-09-19 PROCEDURE — 71046 X-RAY EXAM CHEST 2 VIEWS: CPT

## 2022-09-19 PROCEDURE — 93010 ELECTROCARDIOGRAM REPORT: CPT | Performed by: EMERGENCY MEDICINE

## 2022-09-19 PROCEDURE — 85025 COMPLETE CBC W/AUTO DIFF WBC: CPT

## 2022-09-19 RX ORDER — NYSTATIN 100000 U/G
1 CREAM TOPICAL 2 TIMES DAILY
COMMUNITY

## 2022-09-19 RX ORDER — TRIAMCINOLONE ACETONIDE 1 MG/G
1 CREAM TOPICAL 2 TIMES DAILY
COMMUNITY

## 2022-09-19 RX ORDER — NYSTATIN 100000 [USP'U]/G
1 POWDER TOPICAL 2 TIMES DAILY
COMMUNITY

## 2022-09-19 RX ORDER — CYCLOBENZAPRINE HCL 10 MG
10 TABLET ORAL 3 TIMES DAILY
COMMUNITY

## 2022-09-19 NOTE — DISCHARGE INSTRUCTIONS
Before you come to the hospital        Arrival time: AS DIRECTED BY OFFICE     YOU MAY TAKE THE FOLLOWING MEDICATION(S) THE MORNING OF SURGERY WITH A SIP OF WATER: Okay to take Norco and gabapentin am of surgery if needed           ALL OTHER HOME MEDICATION CHECK WITH YOUR PHYSICIAN (especially if you are taking diabetes medicines or blood thinners)          If you were given and instructed to use a germ- killing soap, use as directed the night before surgery and again the morning of surgery or as directed by your surgeon.    (See attached information for How to Use Chlorhexidine for Bathing if applicable.)            Eating and drinking restrictions prior to scheduled arrival time    2 Hours before arrival time STOP   Drinking Clear liquids (water, apple juice-no pulp)     6 Hours before arrival time STOP   Milk or drinks that contain milk, full liquids    6 Hours before arrival time STOP   Light meals or foods, such as toast or cereal    8 Hours before arrival time STOP   Heavy foods, such as meat, fried foods, or fatty foods    (It is extremely important that you follow these guidelines to prevent delay or cancelation of your procedure)     Clear Liquids  Water and flavored water                                                                      Clear Fruit juices, such as cranberry juice and apple juice.  Black coffee (NO cream of any kind, including powdered).  Plain tea  Clear bouillon or broth.  Flavored gelatin.  Soda.  Gatorade or Powerade.  Full liquid examples  Juices that have pulp.  Frozen ice pops that contain fruit pieces.  Coffee with creamer  Milk.  Yogurt.                MANAGING PAIN AFTER SURGERY    We know you are probably wondering what your pain will be like after surgery.  Following surgery it is unrealistic to expect you will not have pain.   Pain is how our bodies let us know that something is wrong or cautions us to be careful.  That said, our goal is to make your pain  tolerable.    Methods we may use to treat your pain include (oral or IV medications, PCAs, epidurals, nerve blocks, etc.)   While some procedures require IV pain medications for a short time after surgery, transitioning to pain medications by mouth allows for better management of pain.   Your nurse will encourage you to take oral pain medications whenever possible.  IV medications work almost immediately, but only last a short while.  Taking medications by mouth allows for a more constant level of medication in your blood stream for a longer period of time.      Once your pain is out of control it is harder to get back under control.  It is important you are aware when your next dose of pain medication is due.  If you are admitted, your nurse may write the time of your next dose on the white board in your room to help you remember.      We are interested in your pain and encourage you to inform us about aggravating factors during your visit.   Many times a simple repositioning every few hours can make a big difference.    If your physician says it is okay, do not let your pain prevent you from getting out of bed. Be sure to call your nurse for assistance prior to getting up so you do not fall.      Before surgery, please decide your tolerable pain goal.  These faces help describe the pain ratings we use on a 0-10 scale.   Be prepared to tell us your goal and whether or not you take pain or anxiety medications at home.          Preparing for Surgery  Preparing for surgery is an important part of your care. It can make things go more smoothly and help you avoid complications. The steps leading up to surgery may vary among hospitals. Follow all instructions given to you by your health care providers. Ask questions if you do not understand something. Talk about any concerns that you have.  Here are some questions to consider asking before your surgery:  If my surgery is not an emergency (is elective), when would be the  best time to have the surgery?  What arrangements do I need to make for work, home, or school?  What will my recovery be like? How long will it be before I can return to normal activities?  Will I need to prepare my home? Will I need to arrange care for me or my children?  Should I expect to have pain after surgery? What are my pain management options? Are there nonmedical options that I can try for pain?  Tell a health care provider about:  Any allergies you have.  All medicines you are taking, including vitamins, herbs, eye drops, creams, and over-the-counter medicines.  Any problems you or family members have had with anesthetic medicines.  Any blood disorders you have.  Any surgeries you have had.  Any medical conditions you have.  Whether you are pregnant or may be pregnant.  What are the risks?  The risks and complications of surgery depend on the specific procedure that you have. Discuss all the risks with your health care providers before your surgery. Ask about common surgical complications, which may include:  Infection.  Bleeding or a need for blood replacement (transfusion).  Allergic reactions to medicines.  Damage to surrounding nerves, tissues, or structures.  A blood clot.  Scarring.  Failure of the surgery to correct the problem.  Follow these instructions before the procedure:  Several days or weeks before your procedure  You may have a physical exam by your primary health care provider to make sure it is safe for you to have surgery.  You may have testing. This may include a chest X-ray, blood and urine tests, electrocardiogram (ECG), or other testing.  Ask your health care provider about:  Changing or stopping your regular medicines. This is especially important if you are taking diabetes medicines or blood thinners.  Taking medicines such as aspirin and ibuprofen. These medicines can thin your blood. Do not take these medicines unless your health care provider tells you to take them.  Taking  over-the-counter medicines, vitamins, herbs, and supplements.  Do not use any products that contain nicotine or tobacco, such as cigarettes and e-cigarettes. If you need help quitting, ask your health care provider.  Avoid alcohol.  Ask your health care provider if there are exercises you can do to prepare for surgery.  Eat a healthy diet.   Plan to have someone take you home from the hospital or clinic.  Plan to have a responsible adult care for you for at least 24 hours after you leave the hospital or clinic. This is important.  The day before your procedure  You may be given antibiotic medicine to take by mouth to help prevent infection. Take it as told by your health care provider.  You may be asked to shower with a germ-killing soap.  Follow instructions from your health care provider about eating and drinking restrictions. This includes gum, mints and hard candy.  Pack comfortable clothes according to your procedure.   The day of your procedure  You may need to take another shower with a germ-killing soap before you leave home in the morning.  With a small sip of water, take only the medicines that you are told to take.  Remove all jewelry including rings.   Leave anything you consider valuable at home except hearing aids if needed.  Do not wear any makeup, nail polish, powder, deodorant, lotion, hair accessories, or anything on your skin or body except your clothes.  If you will be staying in the hospital, bring a case to hold your glasses, contacts, or dentures. You may also want to bring your robe and non-skid footwear.  If you wear oxygen at home, bring it with you the day of surgery.  If instructed by your health care provider, bring your sleep apnea device with you on the day of your surgery (if this applies to you).  You may want to leave your suitcase and sleep apnea device in the car until after surgery.   Arrive at the hospital as scheduled.  Bring a friend or family member with you who can help to  answer questions and be present while you meet with your health care provider.  At the hospital  When you arrive at the hospital:  Go to registration located at the main entrance of the hospital. You will be registered and given a beeper and a sticker sheet. Take the stickers to the Outpatient nurses desk and place in the black tray. This is to notify staff that you have arrived. Then return to the lobby to wait.   When your beeper lights up and vibrates proceed through the double doors, under the stairs, and a member of the Outpatient Surgery staff will escort you to your preoperative room.  You may have to wear compression sleeves. These help to prevent blood clots and reduce swelling in your legs.  An IV may be inserted into one of your veins.              In the operating room, you may be given one or more of the following:        A medicine to help you relax (sedative).        A medicine to numb the area (local anesthetic).        A medicine to make you fall asleep (general anesthetic).        A medicine that is injected into an area of your body to numb everything below the                      injection site (regional anesthetic).  You may be given an antibiotic through your IV to help prevent infection.  Your surgical site will be marked or identified.    Contact a health care provider if you:  Develop a fever of more than 100.4°F (38°C) or other feelings of illness during the 48 hours before your surgery.  Have symptoms that get worse.  Have questions or concerns about your surgery.  Summary  Preparing for surgery can make the procedure go more smoothly and lower your risk of complications.  Before surgery, make a list of questions and concerns to discuss with your surgeon. Ask about the risks and possible complications.  In the days or weeks before your surgery, follow all instructions from your health care provider. You may need to stop smoking, avoid alcohol, follow eating restrictions, and change or  stop your regular medicines.  Contact your surgeon if you develop a fever or other signs of illness during the few days before your surgery.  This information is not intended to replace advice given to you by your health care provider. Make sure you discuss any questions you have with your health care provider.  Document Revised: 12/21/2018 Document Reviewed: 10/23/2018  [employer] Patient Education © 2021 [employer]

## 2022-09-20 ENCOUNTER — TELEPHONE (OUTPATIENT)
Dept: PODIATRY | Facility: CLINIC | Age: 37
End: 2022-09-20

## 2022-09-21 ENCOUNTER — NURSE TRIAGE (OUTPATIENT)
Dept: CALL CENTER | Facility: HOSPITAL | Age: 37
End: 2022-09-21

## 2022-09-21 ENCOUNTER — ANESTHESIA EVENT (OUTPATIENT)
Dept: PERIOP | Facility: HOSPITAL | Age: 37
End: 2022-09-21

## 2022-09-21 ENCOUNTER — APPOINTMENT (OUTPATIENT)
Dept: GENERAL RADIOLOGY | Facility: HOSPITAL | Age: 37
End: 2022-09-21

## 2022-09-21 ENCOUNTER — TELEPHONE (OUTPATIENT)
Dept: PODIATRY | Facility: CLINIC | Age: 37
End: 2022-09-21

## 2022-09-21 ENCOUNTER — HOSPITAL ENCOUNTER (OUTPATIENT)
Facility: HOSPITAL | Age: 37
Setting detail: SURGERY ADMIT
Discharge: HOME | End: 2022-09-21
Attending: PODIATRIST | Admitting: PODIATRIST

## 2022-09-21 ENCOUNTER — ANESTHESIA (OUTPATIENT)
Dept: PERIOP | Facility: HOSPITAL | Age: 37
End: 2022-09-21

## 2022-09-21 VITALS
HEART RATE: 106 BPM | DIASTOLIC BLOOD PRESSURE: 77 MMHG | WEIGHT: 250.66 LBS | BODY MASS INDEX: 42.27 KG/M2 | OXYGEN SATURATION: 93 % | TEMPERATURE: 97.8 F | SYSTOLIC BLOOD PRESSURE: 115 MMHG | RESPIRATION RATE: 16 BRPM

## 2022-09-21 DIAGNOSIS — M21.42 PES PLANUS OF BOTH FEET: ICD-10-CM

## 2022-09-21 DIAGNOSIS — M76.821 POSTERIOR TIBIAL TENDON DYSFUNCTION (PTTD) OF RIGHT LOWER EXTREMITY: ICD-10-CM

## 2022-09-21 DIAGNOSIS — M21.611 BUNION, RIGHT FOOT: ICD-10-CM

## 2022-09-21 DIAGNOSIS — M21.41 PES PLANUS OF BOTH FEET: ICD-10-CM

## 2022-09-21 LAB
B-HCG UR QL: NEGATIVE
QT INTERVAL: 366 MS
QTC INTERVAL: 477 MS

## 2022-09-21 PROCEDURE — 20900 REMOVAL OF BONE FOR GRAFT: CPT | Performed by: NURSE PRACTITIONER

## 2022-09-21 PROCEDURE — 27658 REPAIR OF LEG TENDON EACH: CPT | Performed by: NURSE PRACTITIONER

## 2022-09-21 PROCEDURE — C1713 ANCHOR/SCREW BN/BN,TIS/BN: HCPCS | Performed by: PODIATRIST

## 2022-09-21 PROCEDURE — 25010000002 CEFAZOLIN PER 500 MG: Performed by: PODIATRIST

## 2022-09-21 PROCEDURE — 27687 REVISION OF CALF TENDON: CPT | Performed by: PODIATRIST

## 2022-09-21 PROCEDURE — 25010000002 FENTANYL CITRATE (PF) 50 MCG/ML SOLUTION: Performed by: ANESTHESIOLOGY

## 2022-09-21 PROCEDURE — 25010000002 DROPERIDOL PER 5 MG: Performed by: ANESTHESIOLOGY

## 2022-09-21 PROCEDURE — 25010000002 MIDAZOLAM PER 1 MG: Performed by: ANESTHESIOLOGY

## 2022-09-21 PROCEDURE — 25010000002 DEXAMETHASONE PER 1 MG: Performed by: ANESTHESIOLOGY

## 2022-09-21 PROCEDURE — 27687 REVISION OF CALF TENDON: CPT | Performed by: NURSE PRACTITIONER

## 2022-09-21 PROCEDURE — 27658 REPAIR OF LEG TENDON EACH: CPT | Performed by: PODIATRIST

## 2022-09-21 PROCEDURE — 28300 INCISION OF HEEL BONE: CPT | Performed by: PODIATRIST

## 2022-09-21 PROCEDURE — 28304 INCISION OF MIDFOOT BONES: CPT | Performed by: PODIATRIST

## 2022-09-21 PROCEDURE — 28304 INCISION OF MIDFOOT BONES: CPT | Performed by: NURSE PRACTITIONER

## 2022-09-21 PROCEDURE — 25010000002 ONDANSETRON PER 1 MG: Performed by: NURSE ANESTHETIST, CERTIFIED REGISTERED

## 2022-09-21 PROCEDURE — 25010000002 PROPOFOL 10 MG/ML EMULSION: Performed by: NURSE ANESTHETIST, CERTIFIED REGISTERED

## 2022-09-21 PROCEDURE — 73620 X-RAY EXAM OF FOOT: CPT

## 2022-09-21 PROCEDURE — 28296 COR HLX VLGS DSTL MTAR OSTEO: CPT | Performed by: PODIATRIST

## 2022-09-21 PROCEDURE — 28300 INCISION OF HEEL BONE: CPT | Performed by: NURSE PRACTITIONER

## 2022-09-21 PROCEDURE — 20900 REMOVAL OF BONE FOR GRAFT: CPT | Performed by: PODIATRIST

## 2022-09-21 PROCEDURE — 25010000002 ROPIVACAINE PER 1 MG: Performed by: ANESTHESIOLOGY

## 2022-09-21 PROCEDURE — 25010000002 DEXAMETHASONE PER 1 MG: Performed by: NURSE ANESTHETIST, CERTIFIED REGISTERED

## 2022-09-21 PROCEDURE — 76942 ECHO GUIDE FOR BIOPSY: CPT | Performed by: PODIATRIST

## 2022-09-21 PROCEDURE — S0260 H&P FOR SURGERY: HCPCS | Performed by: PODIATRIST

## 2022-09-21 PROCEDURE — 81025 URINE PREGNANCY TEST: CPT | Performed by: PODIATRIST

## 2022-09-21 PROCEDURE — 25010000002 KETOROLAC TROMETHAMINE PER 15 MG: Performed by: NURSE ANESTHETIST, CERTIFIED REGISTERED

## 2022-09-21 PROCEDURE — 28296 COR HLX VLGS DSTL MTAR OSTEO: CPT | Performed by: NURSE PRACTITIONER

## 2022-09-21 DEVICE — IMPLANTABLE DEVICE: Type: IMPLANTABLE DEVICE | Site: FOOT | Status: FUNCTIONAL

## 2022-09-21 DEVICE — HEVANS PLATE, MEDIUM, 20MM, RIGHT
Type: IMPLANTABLE DEVICE | Site: FOOT | Status: FUNCTIONAL
Brand: GORILLA PLATING SYSTEM

## 2022-09-21 DEVICE — 28 PLATE, 15 MM, 2-HOLE LOCKING
Type: IMPLANTABLE DEVICE | Site: FOOT | Status: FUNCTIONAL
Brand: GORILLA PLATING SYSTEM

## 2022-09-21 DEVICE — MINI MONSTER HEADED, SHORT THREAD, 2.5 X 12MM
Type: IMPLANTABLE DEVICE | Site: FOOT | Status: FUNCTIONAL
Brand: MONSTER SCREW SYSTEM

## 2022-09-21 DEVICE — 2.7 X 16 MM R3CON LOCKING PLATE SCREW
Type: IMPLANTABLE DEVICE | Site: FOOT | Status: FUNCTIONAL
Brand: GORILLA PLATING SYSTEM

## 2022-09-21 DEVICE — SUT FW 2/0 TPR NDL 18MM AR7220: Type: IMPLANTABLE DEVICE | Site: FOOT | Status: FUNCTIONAL

## 2022-09-21 DEVICE — 2.7 X 28 MM R3CON LOCKING PLATE SCREW
Type: IMPLANTABLE DEVICE | Site: FOOT | Status: FUNCTIONAL
Brand: GORILLA PLATING SYSTEM

## 2022-09-21 DEVICE — MINI MONSTER HEADED, SHORT THREAD, 2.5 X 14MM
Type: IMPLANTABLE DEVICE | Site: FOOT | Status: FUNCTIONAL
Brand: MONSTER SCREW SYSTEM

## 2022-09-21 RX ORDER — DOCUSATE SODIUM 100 MG/1
100 CAPSULE, LIQUID FILLED ORAL DAILY
Qty: 7 CAPSULE | Refills: 0 | Status: SHIPPED | OUTPATIENT
Start: 2022-09-21 | End: 2022-09-28

## 2022-09-21 RX ORDER — LIDOCAINE HYDROCHLORIDE 10 MG/ML
0.5 INJECTION, SOLUTION EPIDURAL; INFILTRATION; INTRACAUDAL; PERINEURAL ONCE AS NEEDED
Status: DISCONTINUED | OUTPATIENT
Start: 2022-09-21 | End: 2022-09-21 | Stop reason: HOSPADM

## 2022-09-21 RX ORDER — ROCURONIUM BROMIDE 10 MG/ML
INJECTION, SOLUTION INTRAVENOUS AS NEEDED
Status: DISCONTINUED | OUTPATIENT
Start: 2022-09-21 | End: 2022-09-21 | Stop reason: SURG

## 2022-09-21 RX ORDER — ACETAMINOPHEN 500 MG
1000 TABLET ORAL ONCE
Status: COMPLETED | OUTPATIENT
Start: 2022-09-21 | End: 2022-09-21

## 2022-09-21 RX ORDER — DROPERIDOL 2.5 MG/ML
0.62 INJECTION, SOLUTION INTRAMUSCULAR; INTRAVENOUS ONCE AS NEEDED
Status: COMPLETED | OUTPATIENT
Start: 2022-09-21 | End: 2022-09-21

## 2022-09-21 RX ORDER — DEXAMETHASONE SODIUM PHOSPHATE 4 MG/ML
4 INJECTION, SOLUTION INTRA-ARTICULAR; INTRALESIONAL; INTRAMUSCULAR; INTRAVENOUS; SOFT TISSUE ONCE AS NEEDED
Status: COMPLETED | OUTPATIENT
Start: 2022-09-21 | End: 2022-09-21

## 2022-09-21 RX ORDER — FLUMAZENIL 0.1 MG/ML
0.2 INJECTION INTRAVENOUS AS NEEDED
Status: DISCONTINUED | OUTPATIENT
Start: 2022-09-21 | End: 2022-09-21 | Stop reason: HOSPADM

## 2022-09-21 RX ORDER — PROMETHAZINE HYDROCHLORIDE 12.5 MG/1
12.5 TABLET ORAL EVERY 8 HOURS PRN
Qty: 21 TABLET | Refills: 0 | Status: SHIPPED | OUTPATIENT
Start: 2022-09-21 | End: 2022-09-28

## 2022-09-21 RX ORDER — NALOXONE HCL 0.4 MG/ML
0.4 VIAL (ML) INJECTION AS NEEDED
Status: DISCONTINUED | OUTPATIENT
Start: 2022-09-21 | End: 2022-09-21 | Stop reason: HOSPADM

## 2022-09-21 RX ORDER — BUPIVACAINE HYDROCHLORIDE AND EPINEPHRINE 5; 5 MG/ML; UG/ML
INJECTION, SOLUTION PERINEURAL AS NEEDED
Status: DISCONTINUED | OUTPATIENT
Start: 2022-09-21 | End: 2022-09-21 | Stop reason: HOSPADM

## 2022-09-21 RX ORDER — ONDANSETRON 2 MG/ML
4 INJECTION INTRAMUSCULAR; INTRAVENOUS ONCE AS NEEDED
Status: DISCONTINUED | OUTPATIENT
Start: 2022-09-21 | End: 2022-09-21 | Stop reason: HOSPADM

## 2022-09-21 RX ORDER — FENTANYL CITRATE 50 UG/ML
25 INJECTION, SOLUTION INTRAMUSCULAR; INTRAVENOUS
Status: DISCONTINUED | OUTPATIENT
Start: 2022-09-21 | End: 2022-09-21 | Stop reason: HOSPADM

## 2022-09-21 RX ORDER — OXYCODONE AND ACETAMINOPHEN 7.5; 325 MG/1; MG/1
2 TABLET ORAL EVERY 4 HOURS PRN
Status: DISCONTINUED | OUTPATIENT
Start: 2022-09-21 | End: 2022-09-21 | Stop reason: HOSPADM

## 2022-09-21 RX ORDER — MIDAZOLAM HYDROCHLORIDE 1 MG/ML
1 INJECTION INTRAMUSCULAR; INTRAVENOUS
Status: DISCONTINUED | OUTPATIENT
Start: 2022-09-21 | End: 2022-09-21 | Stop reason: HOSPADM

## 2022-09-21 RX ORDER — ROPIVACAINE HYDROCHLORIDE 5 MG/ML
INJECTION, SOLUTION EPIDURAL; INFILTRATION; PERINEURAL
Status: COMPLETED | OUTPATIENT
Start: 2022-09-21 | End: 2022-09-21

## 2022-09-21 RX ORDER — MIDAZOLAM HYDROCHLORIDE 1 MG/ML
2 INJECTION INTRAMUSCULAR; INTRAVENOUS ONCE
Status: COMPLETED | OUTPATIENT
Start: 2022-09-21 | End: 2022-09-21

## 2022-09-21 RX ORDER — SODIUM CHLORIDE, SODIUM LACTATE, POTASSIUM CHLORIDE, CALCIUM CHLORIDE 600; 310; 30; 20 MG/100ML; MG/100ML; MG/100ML; MG/100ML
1000 INJECTION, SOLUTION INTRAVENOUS CONTINUOUS
Status: DISCONTINUED | OUTPATIENT
Start: 2022-09-21 | End: 2022-09-21 | Stop reason: HOSPADM

## 2022-09-21 RX ORDER — FENTANYL CITRATE 50 UG/ML
50 INJECTION, SOLUTION INTRAMUSCULAR; INTRAVENOUS ONCE
Status: COMPLETED | OUTPATIENT
Start: 2022-09-21 | End: 2022-09-21

## 2022-09-21 RX ORDER — LABETALOL HYDROCHLORIDE 5 MG/ML
5 INJECTION, SOLUTION INTRAVENOUS
Status: DISCONTINUED | OUTPATIENT
Start: 2022-09-21 | End: 2022-09-21 | Stop reason: HOSPADM

## 2022-09-21 RX ORDER — PROPOFOL 10 MG/ML
VIAL (ML) INTRAVENOUS AS NEEDED
Status: DISCONTINUED | OUTPATIENT
Start: 2022-09-21 | End: 2022-09-21 | Stop reason: SURG

## 2022-09-21 RX ORDER — MORPHINE SULFATE 15 MG/1
15 TABLET ORAL EVERY 6 HOURS PRN
Qty: 12 TABLET | Refills: 0 | Status: SHIPPED | OUTPATIENT
Start: 2022-09-21 | End: 2022-09-24

## 2022-09-21 RX ORDER — OXYCODONE AND ACETAMINOPHEN 10; 325 MG/1; MG/1
1 TABLET ORAL ONCE AS NEEDED
Status: DISCONTINUED | OUTPATIENT
Start: 2022-09-21 | End: 2022-09-21 | Stop reason: HOSPADM

## 2022-09-21 RX ORDER — SODIUM CHLORIDE, SODIUM LACTATE, POTASSIUM CHLORIDE, CALCIUM CHLORIDE 600; 310; 30; 20 MG/100ML; MG/100ML; MG/100ML; MG/100ML
100 INJECTION, SOLUTION INTRAVENOUS CONTINUOUS
Status: DISCONTINUED | OUTPATIENT
Start: 2022-09-21 | End: 2022-09-21 | Stop reason: HOSPADM

## 2022-09-21 RX ORDER — MAGNESIUM HYDROXIDE 1200 MG/15ML
LIQUID ORAL AS NEEDED
Status: DISCONTINUED | OUTPATIENT
Start: 2022-09-21 | End: 2022-09-21 | Stop reason: HOSPADM

## 2022-09-21 RX ORDER — SODIUM CHLORIDE 0.9 % (FLUSH) 0.9 %
3 SYRINGE (ML) INJECTION EVERY 12 HOURS SCHEDULED
Status: DISCONTINUED | OUTPATIENT
Start: 2022-09-21 | End: 2022-09-21 | Stop reason: HOSPADM

## 2022-09-21 RX ORDER — LIDOCAINE HYDROCHLORIDE 20 MG/ML
INJECTION, SOLUTION EPIDURAL; INFILTRATION; INTRACAUDAL; PERINEURAL AS NEEDED
Status: DISCONTINUED | OUTPATIENT
Start: 2022-09-21 | End: 2022-09-21 | Stop reason: SURG

## 2022-09-21 RX ORDER — KETOROLAC TROMETHAMINE 30 MG/ML
INJECTION, SOLUTION INTRAMUSCULAR; INTRAVENOUS AS NEEDED
Status: DISCONTINUED | OUTPATIENT
Start: 2022-09-21 | End: 2022-09-21 | Stop reason: SURG

## 2022-09-21 RX ORDER — SODIUM CHLORIDE 0.9 % (FLUSH) 0.9 %
3-10 SYRINGE (ML) INJECTION AS NEEDED
Status: DISCONTINUED | OUTPATIENT
Start: 2022-09-21 | End: 2022-09-21 | Stop reason: HOSPADM

## 2022-09-21 RX ORDER — BUPIVACAINE HCL/0.9 % NACL/PF 0.1 %
2 PLASTIC BAG, INJECTION (ML) EPIDURAL ONCE
Status: COMPLETED | OUTPATIENT
Start: 2022-09-21 | End: 2022-09-21

## 2022-09-21 RX ORDER — SODIUM CHLORIDE 0.9 % (FLUSH) 0.9 %
3 SYRINGE (ML) INJECTION AS NEEDED
Status: DISCONTINUED | OUTPATIENT
Start: 2022-09-21 | End: 2022-09-21 | Stop reason: HOSPADM

## 2022-09-21 RX ORDER — DEXAMETHASONE SODIUM PHOSPHATE 4 MG/ML
INJECTION, SOLUTION INTRA-ARTICULAR; INTRALESIONAL; INTRAMUSCULAR; INTRAVENOUS; SOFT TISSUE AS NEEDED
Status: DISCONTINUED | OUTPATIENT
Start: 2022-09-21 | End: 2022-09-21 | Stop reason: SURG

## 2022-09-21 RX ORDER — OXYCODONE AND ACETAMINOPHEN 7.5; 325 MG/1; MG/1
1 TABLET ORAL EVERY 6 HOURS PRN
Qty: 28 TABLET | Refills: 0 | Status: SHIPPED | OUTPATIENT
Start: 2022-09-21 | End: 2022-09-28 | Stop reason: SDUPTHER

## 2022-09-21 RX ORDER — ASPIRIN 325 MG
325 TABLET ORAL DAILY
Qty: 30 TABLET | Refills: 1 | Status: CANCELLED | OUTPATIENT
Start: 2022-09-21 | End: 2022-10-21

## 2022-09-21 RX ORDER — ONDANSETRON 2 MG/ML
INJECTION INTRAMUSCULAR; INTRAVENOUS AS NEEDED
Status: DISCONTINUED | OUTPATIENT
Start: 2022-09-21 | End: 2022-09-21 | Stop reason: SURG

## 2022-09-21 RX ADMIN — PROPOFOL 200 MG: 10 INJECTION, EMULSION INTRAVENOUS at 07:07

## 2022-09-21 RX ADMIN — DEXAMETHASONE SODIUM PHOSPHATE 4 MG: 4 INJECTION, SOLUTION INTRAMUSCULAR; INTRAVENOUS at 06:49

## 2022-09-21 RX ADMIN — DROPERIDOL 0.62 MG: 2.5 INJECTION, SOLUTION INTRAMUSCULAR; INTRAVENOUS at 10:41

## 2022-09-21 RX ADMIN — KETOROLAC TROMETHAMINE 30 MG: 30 INJECTION, SOLUTION INTRAMUSCULAR; INTRAVENOUS at 10:05

## 2022-09-21 RX ADMIN — SODIUM CHLORIDE, POTASSIUM CHLORIDE, SODIUM LACTATE AND CALCIUM CHLORIDE 1000 ML: 600; 310; 30; 20 INJECTION, SOLUTION INTRAVENOUS at 06:12

## 2022-09-21 RX ADMIN — ACETAMINOPHEN 1000 MG: 500 TABLET ORAL at 06:40

## 2022-09-21 RX ADMIN — ROPIVACAINE HYDROCHLORIDE 20 ML: 5 INJECTION, SOLUTION EPIDURAL; INFILTRATION; PERINEURAL at 06:53

## 2022-09-21 RX ADMIN — DEXAMETHASONE SODIUM PHOSPHATE 8 MG: 4 INJECTION, SOLUTION INTRA-ARTICULAR; INTRALESIONAL; INTRAMUSCULAR; INTRAVENOUS; SOFT TISSUE at 08:05

## 2022-09-21 RX ADMIN — ROCURONIUM BROMIDE 50 MG: 50 INJECTION INTRAVENOUS at 07:07

## 2022-09-21 RX ADMIN — FENTANYL CITRATE 50 MCG: 50 INJECTION, SOLUTION INTRAMUSCULAR; INTRAVENOUS at 06:47

## 2022-09-21 RX ADMIN — Medication 2 G: at 07:15

## 2022-09-21 RX ADMIN — ROPIVACAINE HYDROCHLORIDE 20 ML: 5 INJECTION, SOLUTION EPIDURAL; INFILTRATION; PERINEURAL at 06:51

## 2022-09-21 RX ADMIN — SODIUM CHLORIDE, POTASSIUM CHLORIDE, SODIUM LACTATE AND CALCIUM CHLORIDE: 600; 310; 30; 20 INJECTION, SOLUTION INTRAVENOUS at 07:39

## 2022-09-21 RX ADMIN — MIDAZOLAM 2 MG: 1 INJECTION INTRAMUSCULAR; INTRAVENOUS at 06:47

## 2022-09-21 RX ADMIN — ONDANSETRON 4 MG: 2 INJECTION INTRAMUSCULAR; INTRAVENOUS at 09:47

## 2022-09-21 RX ADMIN — Medication 100 MG: at 07:07

## 2022-09-21 NOTE — ANESTHESIA PREPROCEDURE EVALUATION
Anesthesia Evaluation     Patient summary reviewed   no history of anesthetic complications (anxiety with emergence):  NPO Solid Status: > 8 hours             Airway   Mallampati: I  TM distance: >3 FB  No difficulty expected  Dental - normal exam     Pulmonary    (+) a smoker Former,   (-) asthma, sleep apnea  Cardiovascular   Exercise tolerance: excellent (>7 METS)    ECG reviewed    (-) hypertension, hyperlipidemia      Neuro/Psych  (+) weakness (left foot drop), numbness (left foot),    (-) seizures, TIA, CVA  GI/Hepatic/Renal/Endo    (+) morbid obesity,    (-) liver disease, no renal disease, diabetes    Musculoskeletal     Abdominal    Substance History      OB/GYN          Other                      Anesthesia Plan    ASA 3     general with block     intravenous induction     Anesthetic plan, risks, benefits, and alternatives have been provided, discussed and informed consent has been obtained with: patient.        CODE STATUS:

## 2022-09-21 NOTE — OP NOTE
POSTOPERATIVE NOTE  Patient: Nicole AGUILAR  MRN: [id number]    YOB: 1985  Age: 37 y.o.  Sex: female  Unit:  PAD OR Room/Bed: PAD OR/MAIN OR Location: [facility]    Date of Operation: 9/21/2022     Preoperative Diagnosis:  Posterior tibial tendon dysfunction (PTTD) of right lower extremity [M76.821]  Pes planus of both feet [M21.41, M21.42]  Bunion, right foot [M21.611]     Postoperative Diagnosis:  1. Same as pre-operative    Operative Procedures:  1. Tibial Bone Graft and Bone Marrow Aspirate Harvesting - Right  2. Gastrocnemius Recession - Right  3. Schmidt Calcaneal Osteotomy - Right Foot  4. Cotton Osteotomy - Right Foot  5. Emil Bunionectomy - Right Foot  6. Posterior Tibial Tendon Repair - Right Foot    Surgeon: Surgeon(s) and Role:     * Jerry Adrian DPM - Primary    Assistant: GALLO Mi was responsible for performing the following activities: Retraction, Suction, Irrigation and Placing Dressing and their skilled assistance was necessary for the success of this case.     Anesthesia: General with Block     Hemostasis: Anatomic Dissection, ThighTourniquet, Epinephrine, Electric cauterization    Estimated Blood Loss: minimal    Pathology:   None    Materials:   Implant Name Type Inv. Item Serial No.  Lot No. LRB No. Used Action   PLT MARA MENDEZ 20MM RT - [id number] Implant PLT MARA MENDEZ 20MM RT  [employer] 28  Right 1 Implanted   SCRW PLT RECON LK 2.7X28MM - [id number] Implant SCRW PLT RECON LK 2.7X28MM  [employer] 28  Right 3 Implanted   PLT LK FT/ANKL 28PLATE 2HL 15MM - [id number] Implant PLT LK FT/ANKL 28PLATE 2HL 15MM  [employer] 28  Right 1 Implanted   SCRW PLT R3CON LK 2.7X16MM - [id number] Implant SCRW PLT R3CON LK 2.7X16MM  [employer] 28  Right 2 Implanted   SCRW JEANNA MINI MONSTER SHT THRD 2.5X12MM - [id number] Implant SCRW JEANNA MINI MONSTER SHT THRD 2.5X12MM  [employer] 28  Right 1 Implanted   SCRW JEANNA MINI MONSTER SHT THRD 2.5X14MM - [id number] Implant SCRW JEANNA MINI  MONSTER SHT THRD 2.5X14MM  [employer] 28  Right 1 Implanted   ALLOGRVINCENZO LOMELI FT PRESERVE/PIERRE CLMN/LAT/NANCY 10MM - [id number] Implant ALLOGRFT CANC FT PRESERVE/PIERRE CLMN/LAT/NANCY 10MM  [employer]ON 28 [id number] Right 1 Implanted   CECELIA LOMELI FT PRESERVE/COTN OSTEO 5MM - [id number] Implant ALLOGRVINCENZO LOMELI FT PRESERVE/COTN OSTEO 5MM  [employer] 28 [id number] Right 1 Implanted   SUT FW 2/0 TPR NDL 18MM [id number] - [id number] Implant SUT FW 2/0 TPR NDL 18MM [id number]  [id number] Right 1 Implanted       Injectables: 20mL 0.5% Marcaine with Epinephrine    Fluids: See anesthesia log.    Drains: None    Complications: None    Postoperative Condition: Stable. Patient tolerated procedure and anesthesia well. Patient left the operating room with vital signs stable and vascular status intact.     Operative Findings: Consistent with preoperative diagnosis.    Indications for Procedure: This 37 y.o. patient presents with painful deformities of the right foot.  Patient states that they have failed conservative therapy and opts for surgical correction at this time. The patient has been NPO for greater than 8 hours. The patient is ready for surgical intervention.    DESCRIPTION OF PROCEDURE  While in preoperative holding, patient was given a popliteal block by anesthesia.  Under mild sedation, patient was brought into the operating room and placed on the operating room table in supine position. Preoperative antibiotic was given. A pneumatic thigh tourniquet was placed about the patient's right thigh. The patient was placed under General anesthesia, then local block was performed at the surgical site using the above mentioned local anesthesia. The foot and ankle were then scrubbed, prepped and draped in the usual aseptic manner. Using an Esmarch, the foot and ankle were exsanguinated and tourniquet was inflated.    Attention was then directed to the proximal medial aspect of the right tibia where a 2 cm linear longitudinal incision was made.  The  incision was deepened through the subcutaneous tissue sharp blunt dissection.  Care was taken to identify and retract all vital neural and vascular structures.  All bleeders were ligated and cauterized as necessary.  Once down to bone, periosteum was freed from its bony attachment.    Utilizing a Jamshidi needle, access into the medullary canal was performed.  A syringe was then attached to the bone marrow aspirate device and roughly 10 cc of BMA were acquired.  Next a guide hole was placed within the bone.  Utilizing the [employer] 28 bone graft harvester, multiple passes were utilized to obtain close to 5 cc of cancellous bone.  Bone and BMA were placed in containers on the back table.  Wound was then flushed with copious nonsterile saline.  Deep and subcutaneous tissues were reapproximated utilizing 3-0 Vicryl.  Skin was reapproximated coapted utilizing 3-0 nylon in simple suturing technique.     Attention was directed to the right posterior calf where a 4 cm linear longitudinal incision was made.  The incision was deepened to the subcutaneous tissues using sharp and blunt dissection.  Care was taken to identify and retract all vital neural and vascular structures.  All bleeders were ligated and cauterized necessary.  The dissection was continued deep down to the level of the deep fascia was linearly incised thus exposing the myotendinous junction of the gastrocnemius muscle.  Next utilizing a #15 blade and the foot in a loaded position, a release of the gastrocnemius tendon was performed.  Decreased contracture of the lower extremity was noted upon completion.  The plantaris tendon was also identified and released.  Wound was then flushed with copious muscle sterile saline.  Peritenon and deep tissue was reapproximated utilizing 4-0 Vicryl.  Subcutaneous tissues reapproximated utilizing 4-0 Vicryl.  Skin was reapproximated coapted utilizing 3-0 nylon in a horizontal mattress suturing technique.    Using an Esmarch,  the foot and ankle were exsanguinated and tourniquet was inflated.    Attention was then directed to the lateral foot where the distal calcaneus was identified utilizing fluoroscopy.  A 4 cm linear incision was made over the distal lateral calcaneus.  The incision was deepened through the subcutaneous tissues using sharp and blunt dissection.  Care was taken to identify and retract all vital neural and vascular structures.  All bleeders were ligated and cauterized as necessary.  The peroneal tendons were identified within the wound and retracted plantarly.  The sural nerve was not visualized during this procedure.  The dorsal and plantar aspects of the calcaneus were identified.  Utilizing fluoroscopy and visualization, a marker roughly 1.5 cm proximal to the CC joint was made.  Next a dorsal to plantar horizontal osteotomy was performed but keeping the medial cortex intact to be utilized as a hinge.  Upon completion of the osteotomy, osteotomes were placed within the osteotomy site for distraction.  At this point Schmidt bone graft sizers were placed within the osteotomy and proper sizing achieved.  The osteotomy site was fenestrated. The Schmidt graft was then placed within the osteotomy site after being soaked in BMA and tamped into place.  A portion of the acquired bone graft was packed into the site to fill voids.  Good positioning was confirmed with fluoroscopy.  Next a [place] 28 3-hole plate was placed across the graft to act as a buttress.  The wound was flushed with copious muscle sterile normal saline.  Remaining BMA was injected into the wound.  Deep and subcutaneous tissues were reapproximated utilizing 3-0 Vicryl.  Skin was reapproximated coapted utilizing 3-0 nylon in horizontal mattress and simple suturing techniques.      Attention was then directed to the dorsal aspect of the medial cuneiform at which point a 4 cm linear longitudinal incision was made.  Incision was deepened through the subcutaneous  tissues using sharp and blunt dissection.  Care was taken to identify and retract all vital neural and vascular structures.  All bleeders were ligated and cauterized as necessary.  The extensor hallucis longus tendon was identified and retracted laterally.  A periosteal incision was made overlying the medial cuneiform.  Periosteum was reflected medially laterally thus exposing the dorsal aspect of the medial cuneiform.  Next utilizing a bone saw, an osteotomy was performed from dorsal to plantar keeping the plantar cortex intact.  Completion of the osteotomy, osteotomes were placed within the osteotomy site for distraction.  At this point Cotton bone graft sizers were placed within the osteotomy and proper sizing achieved. The osteotomy site was fenestrated. The Cotton graft was then placed within the osteotomy site after being soaked in BMA and tamped into place.  A portion of the acquired bone graft was packed into the site to fill voids. Good positioning was confirmed with fluoroscopy.  Next a [place] 28,  2 hole plate was placed on the dorsal aspect of the medial cuneiform for fixation.  The wound was flushed with copious amounts of sterile normal saline.  Remaining BMA was injected into the wound.  Deep and subcutaneous tissues were reapproximated utilizing 3-0 Vicryl.  Skin was reapproximated coapted utilizing 3-0 nylon in horizontal mattress and simple suturing techniques.      Correction of the deformities was assessed at this time utilizing fluoroscopy and manipulation.  Significant correction of the flatfoot was achieved.    Attention was then directed to the dorsal aspect of the 1st metatarsal head of the right foot where a roughly 6cm linear longitudinal incision was made medial and parallel to the tendon of extensor hallucis longus and involve the contour of the deformity. The incision was deepened through the subcutaneous tissues using sharp and blunt dissection. Care was taken to identify and retract  all vital neural and vascular structures. All bleeders were ligated and cauterized as necessary. The dissection of continued down to the level of capsular tissue.  Of note, there was a small ganglion cyst at the proximal medial portion of the first metatarsal head.  The cyst was punctured and dissection but completely drained.     Attention was then directed to the 1st interspace via the original skin incision where the tendon of the extensor hallucis brevis was initially identified and a tenotomy performed. The dissection was continued deep using blunt dissection down to the level of the fibular sesamoid which was freed of its soft tissue attachments proximally, laterally and distally. The conjoined tendon of the adducted hallucis muscle was then identified and transected at its attachment to the base of the proximal phalanx of the hallux. At this time the lateral contracture present on the hallux was noted to be reduced and the sesamoid apparatus was noted to float into a more corrected medial position.      At this time a longitudinal linear capsulotomy was performed over the dorsal aspect of the first MTPJ. Periosteal and capsular structures were then carefully dissected free of their osseous attachments and reflected medial and lateral, thus exposing the head of the first metatarsal and base of the proximal phalanx. Next utilizing a bone saw, the prominent medial eminence was resected and passed from the operative field. A 0.045 K-wire was the inserted medial to lateral through the metatarsal head to be used as a guide pin. Then a through and through V-type osteotomy was created at the metaphyseal region of the bone utilizing a bone saw. The apex of the osteotomy was pointed distally with the arms pointing proximal plantar and proximal dorsal. The dorsal arm was made longer to accommodate internal fixation. Upon completion of the osteotomy, the guide wire was removed and the capital fragment was distracted and  shifted laterally into a more corrected position and impacted into the metatarsal shaft. Next, 2 [place] 28, cannulated screws were positioned across the osteotomy site in dorsal distal to proximal plantar orientation. Good stable fixation was achieved. The remaining medial eminence was then resected and all rough edges were smoothed using a bone bur. The wound was flushed with copious amounts of sterile normal saline. Correction of the deformity was assessed at this time and noted to greatly improved. Hemostasis was obtained. Redundant medial capsular tissue was resected.  Periosteal and capsular structures were reapproximated using 2-0 and 3-0 Vicryl. Subcutaneous tissue was reapproximated using 4-0 Vicryl. Skin was reapproximated using 4-0 nylon in horizontal mattress suturing technique.    Attention was then directed to the right medial ankle overlying the posterior tibial tendon where a 5 cm linear longitudinal incision was made.  Incision was deepened through the subcutaneous tissues using sharp and blunt dissection.  Care was taken to identify and retract all vital neurovascular structures.  All bleeders were ligated and cauterized necessary.  The flexor tendon sheath was identified and linearly incised along same course of the skin incision.  At this time the posterior tibial tendon was identified.  There was a 2 cm linear tear roughly at the curvature around the medial malleolus.  The linear tear was then resected and the tendon was re-tubularized using 2-0 FiberWire.  The wound was then flushed with copious amounts of sterile saline.  The flexor tendon sheath was reapproximated utilizing 3-0 Vicryl.  Subcutaneous tissues reapproximated utilizing 3-0 Vicryl.  Skin was reapproximated and coapted utilizing 3-0 nylon in horizontal mattress suturing technique.     The tourniquet was deflated.  Hemostasis had been obtained.  Capillary fill was noted to all toes.     A bandage consisting of Adaptic, 4 x 4's,  Kerlix, and Coban was applied.  Additional cast padding and fiberglass posterior splint were placed.     The patient tolerated procedures and anesthesia well.  She was transferred to the recovery room with vital signs stable and vascular status intact to the right lower extremity.  After period of postoperative monitoring, the patient will be discharged to home with oral and written postoperative instructions.  She will follow-up in office within 1 week.  She is to be nonweightbearing to the surgical foot.  She will be given DVT prophylaxis.

## 2022-09-21 NOTE — H&P
[facility] - PODIATRY    Today's Date: 09/21/22    Patient Name: Nicole AGUILAR  MRN: [id number]  CSN: [id number]  PCP: Anthony Lawler DO  Referring Provider: Jerry Adrian DPM    SUBJECTIVE     No chief complaint on file.    HPI: Nicole AGUILAR, a 37 y.o.female, comes to clinic as a(n) established patient for follow-up treatment of foot and ankle pain. Patient has h/o ADHD, foot drop, herniation of intervertebral disc, IBS, radiculopathy, sciatica. Patient presents for follow-up of right foot and ankle pain. Has been wearing [place] brace since last appointment and states that she initially had some improvement but feels like symptoms are worsening again. Feels like brace helps with balance but is not helping with pain. Had spinal fusion in December and was hoping to be farther from spinal surgery prior to consideration of foot surgery but she states that at this point she feels like symptoms warrant correction.  Relates that due to the chronic pain in her foot, she is now interested in having surgical correction of deformities to improve pain and ambulation.  Relates that she continues to have issues with foot drop of the left foot and is wearing an AFO.  Admits pain at 8/10 level and described as aching, throbbing, numbness and tingling. Relates previous treatment(s) including CAM boot, brace, anti-inflammatories. Denies any constitutional symptoms. No other pedal complaints at this time.    Past Medical History:   Diagnosis Date   • ADHD (attention deficit hyperactivity disorder)     pt states as a child   • DDD (degenerative disc disease), lumbar    • Foot drop, left foot    • Herniation of intervertebral disc between L5 and S1    • History of back surgery     a lift and fusion   • Irritable bowel syndrome    • Lumbosacral radiculopathy 10/16/2020   • Sciatic nerve pain, left      Past Surgical History:   Procedure Laterality Date   • APPENDECTOMY     • COLONOSCOPY  07/14/2016     Hemorrhoids otherwise normal exam repeat in 10 years   • ENDOSCOPY  2016    LA Grade A reflux esophagitis   • LUMBAR DISCECTOMY Left 10/16/2020    Procedure: LEFT L5-S1 MICRODISCECTOMY;  Surgeon: RUBIA Min MD;  Location: [facility];  Service: Orthopedic Spine;  Laterality: Left;   • LUMBAR FUSION       Family History   Problem Relation Age of Onset   • No Known Problems Mother    • COPD Father    • Heart disease Father    • Diabetes Father    • Heart failure Father    • Atrial fibrillation Father    • Colon cancer Neg Hx    • Colon polyps Neg Hx      Social History     Socioeconomic History   • Marital status:    Tobacco Use   • Smoking status: Former Smoker     Packs/day: 0.50     Years: 22.00     Pack years: 11.00     Types: Cigarettes     Quit date: 10/1/2020     Years since quittin.9   • Smokeless tobacco: Never Used   Vaping Use   • Vaping Use: Former   • Substances: Nicotine   • Devices: Disposable, Pre-filled or refillable cartridge   • Passive vaping exposure: Yes   Substance and Sexual Activity   • Alcohol use: Yes     Comment: occ 3 to 4 glasses of wine a week   • Drug use: No   • Sexual activity: Yes     Partners: Male     Allergies   Allergen Reactions   • Doxycycline Anaphylaxis     ALL CYCLINES    • Latex Rash     Current Facility-Administered Medications   Medication Dose Route Frequency Provider Last Rate Last Admin   • ceFAZolin in 0.9% normal saline (ANCEF) IVPB solution 2 g  2 g Intravenous Once Jerry Adrian DPM       • lactated ringers infusion 1,000 mL  1,000 mL Intravenous Continuous Jerry Adrian DPM 25 mL/hr at 22 0612 1,000 mL at 22 0612   • lactated ringers infusion  100 mL/hr Intravenous Continuous Coral Zazueta MD       • lidocaine PF 1% (XYLOCAINE) injection 0.5 mL  0.5 mL Intradermal Once PRN Jerry Adrian DPM       • lidocaine PF 1% (XYLOCAINE) injection 0.5 mL  0.5 mL Injection Once PRN Coral Zazueta MD       •  midazolam (VERSED) injection 1 mg  1 mg Intravenous Q10 Min PRN Coral Zazueta MD       • sodium chloride 0.9 % flush 3 mL  3 mL Intravenous PRN Jerry Adrian DPM       • sodium chloride 0.9 % flush 3 mL  3 mL Intravenous Q12H Coral Zazueta MD       • sodium chloride 0.9 % flush 3-10 mL  3-10 mL Intravenous PRN Coral Zazueta MD         Review of Systems   Constitutional: Negative for chills and fever.   HENT: Negative for congestion.    Respiratory: Negative for shortness of breath.    Cardiovascular: Negative for chest pain and leg swelling.   Gastrointestinal: Negative for constipation, diarrhea, nausea and vomiting.   Musculoskeletal: Positive for back pain and gait problem.        Foot pain   Skin: Negative for wound.   Neurological: Positive for weakness and numbness.       OBJECTIVE     Vitals:    09/21/22 0650   BP: 98/75   Pulse: 107   Resp: 16   Temp:    SpO2: 97%       PHYSICAL EXAM  GEN:   Accompanied by none.     Physical Exam  Vitals reviewed.   Constitutional:       Appearance: Normal appearance. She is well-developed. She is obese.   HENT:      Head: Normocephalic and atraumatic.      Right Ear: Tympanic membrane normal.      Left Ear: Tympanic membrane normal.      Nose: Nose normal.      Mouth/Throat:      Pharynx: Oropharynx is clear.   Eyes:      Extraocular Movements: Extraocular movements intact.      Pupils: Pupils are equal, round, and reactive to light.   Cardiovascular:      Rate and Rhythm: Normal rate and regular rhythm.      Pulses: Normal pulses.           Dorsalis pedis pulses are 2+ on the right side and 2+ on the left side.        Posterior tibial pulses are 2+ on the right side and 2+ on the left side.      Heart sounds: Normal heart sounds.   Pulmonary:      Effort: Pulmonary effort is normal.      Breath sounds: Normal breath sounds.   Abdominal:      General: Bowel sounds are normal.      Palpations: Abdomen is soft.   Musculoskeletal:       Cervical back: Normal range of motion and neck supple.      Right ankle:  over the medial malleolus.      Left ankle:  over the lateral malleolus.      Right foot: Bunion (Mild medial eminence with slight abduction of the hallux.  Able to reduce.  No crepitus.  Small cystic type lesion along the proximal medial aspect.) present.   Feet:      Right foot:      Protective Sensation: 10 sites sensed.      Skin integrity: Warmth present.      Toenail Condition: Right toenails are normal.      Left foot:      Protective Sensation: 7 sites sensed.      Skin integrity: Warmth present.      Toenail Condition: Left toenails are normal.   Neurological:      General: No focal deficit present.      Mental Status: She is alert and oriented to person, place, and time. Mental status is at baseline.   Psychiatric:         Mood and Affect: Mood normal.         Behavior: Behavior normal.         Thought Content: Thought content normal.         Judgment: Judgment normal.         Foot/Ankle Exam:       General:   Appearance: appears stated age and healthy and obesity    Orientation: AAOx3    Affect: appropriate    Gait: antalgic    Assistance: cane    Shoe Gear:  Casual shoes (Left AFO)    VASCULAR      Right Foot Vascularity   Dorsalis pedis:  2+  Posterior tibial:  2+  Skin Temperature: warm    Edema Grading:  None  CFT:  3  Pedal Hair Growth:  Present  Varicosities: none       Left Foot Vascularity   Dorsalis pedis:  2+  Posterior tibial:  2+  Skin Temperature: warm    Edema Grading:  None  CFT:  3  Pedal Hair Growth:  Present  Varicosities: none        NEUROLOGIC     Right Foot Neurologic   Normal sensation    Light touch sensation:  Normal  Vibratory sensation:  Normal  Hot/Cold sensation: normal    Protective Sensation using [place]-Zaina Monofilament:  10     Left Foot Neurologic   Light touch sensation:  Diminished  Vibratory sensation:  Diminished  Hot/cold sensation: diminished    Protective Sensation using [place]-Zaina  Monofilament:  7     MUSCULOSKELETAL      Right Foot Musculoskeletal   Ecchymosis:  None  Tenderness: medial malleolus and posterior tibial tendon    Arch:  Pes planus (Severe decrease in arch height with pronation upon weightbearing.  Mild eversion of the heel and lateral mid foot deviation.  Able to mostly reduce the deformity.  No significant crepitus.)  Hallux valgus: Yes (Mild medial eminence with slight abduction of the hallux.  Able to reduce.  No crepitus.  Small cystic type lesion along the proximal medial aspect.)       Left Foot Musculoskeletal   Ecchymosis:  None  Tenderness: lateral malleolus and peroneal tendon    Tenderness comment:  ATFL  Arch:  Pes planus     MUSCLE STRENGTH     Right Foot Muscle Strength   Foot dorsiflexion:  5  Foot plantar flexion:  5  Foot inversion:  5  Foot eversion:  5     Left Foot Muscle Strength   Foot dorsiflexion:  4-  Foot plantar flexion:  5  Foot inversion:  4-  Foot eversion:  4-     RANGE OF MOTION      Right Foot Range of Motion   Ankle dorsiflexion:  5  ankle dorsiflexion pain    plantar flexion pain    foot eversion pain    foot inversion pain       Left Foot Range of Motion   Foot and ankle ROM within normal limits    active eversion pain    active inversion pain       DERMATOLOGIC     Right Foot Dermatologic   Skin: skin intact    Nails: normal       Left Foot Dermatologic   Skin: skin intact    Nails: normal        RADIOLOGY/NUCLEAR:  XR Chest 2 View    Result Date: 9/19/2022  Narrative: EXAM/TECHNIQUE: XR CHEST 2 VW-  INDICATION: Pre op; M76.821-Posterior tibial tendinitis, right leg; M21.41-Flat foot (pes planus) (acquired), right foot; M21.42-Flat foot (pes planus) (acquired), left foot; M21.611-Bunion of right foot  COMPARISON: 6/23/2021  FINDINGS:  Cardiomediastinal silhouette is within normal limits. No pleural effusion or pneumothorax. No focal consolidation. No acute osseous findings.      Impression:  No acute findings. This report was finalized on  09/19/2022 10:20 by Dr. Peter Jacobs MD.    Peripheral Block    Result Date: 9/21/2022  Narrative: Dimitri Roy MD     9/21/2022  6:56 AM Peripheral Block Pre-sedation assessment completed: 9/21/2022 6:47 AM Patient reassessed immediately prior to procedure Patient location during procedure: holding area Stop time: 9/21/2022 6:53 AM Reason for block: procedure for pain, at surgeon's request, post-op pain management and Requested by Dr. Adrian Performed by Anesthesiologist: Dimitri Roy MD Preanesthetic Checklist Completed: patient identified, IV checked, site marked, risks and benefits discussed, surgical consent, monitors and equipment checked, pre-op evaluation and timeout performed Prep: Pt Position: supine Sterile barriers:mask, gloves, cap and washed/disinfected hands Prep: ChloraPrep Patient monitoring: blood pressure monitoring, continuous pulse oximetry and EKG Procedure Sedation: yes Guidance:ultrasound guided and femoral artery identified in adductor canal and local anesthetic seen surrounding artery ULTRASOUND INTERPRETATION.  Using ultrasound guidance a 20 G gauge needle was placed in close proximity to the femoral nerve, at which point, under ultrasound guidance anesthetic was injected in the area of the nerve and spread of the anesthesia was seen on ultrasound in close proximity thereto.  There were no abnormalities seen on ultrasound; a digital image was taken; and the patient tolerated the procedure with no complications. Images:still images obtained (picture printed and placed in patients chart) Laterality:right Block Type:adductor canal block Injection Technique:single-shot Needle Type:echogenic Resistance on Injection: none Medications Used: ropivacaine (NAROPIN) injection 0.5 %, 20 mL Med administered at 9/21/2022 6:53 AM Post Assessment Injection Assessment: negative aspiration for heme, no paresthesia on injection and incremental injection Patient Tolerance:comfortable throughout block  Complications:no     Peripheral Block    Result Date: 9/21/2022  Narrative: Dimitri Roy MD     9/21/2022  6:55 AM Peripheral Block Pre-sedation assessment completed: 9/21/2022 6:47 AM Patient reassessed immediately prior to procedure Patient location during procedure: holding area Stop time: 9/21/2022 6:51 AM Reason for block: procedure for pain, at surgeon's request, post-op pain management and Requested by Dr. Adrian Performed by Anesthesiologist: Dimitri Roy MD Preanesthetic Checklist Completed: patient identified, IV checked, site marked, risks and benefits discussed, surgical consent, monitors and equipment checked, pre-op evaluation and timeout performed Prep: Pt Position: supine Sterile barriers:mask, gloves, cap and washed/disinfected hands Prep: ChloraPrep Patient monitoring: blood pressure monitoring, continuous pulse oximetry and EKG Procedure Sedation: yes Performed under: MAC Guidance:ultrasound guided and Sciatic nerve identified and local anesthetic seen surrounding nerve ULTRASOUND INTERPRETATION.  Using ultrasound guidance a 20 G gauge needle was placed in close proximity to the sciatic nerve, at which point, under ultrasound guidance anesthetic was injected in the area of the nerve and spread of the anesthesia was seen on ultrasound in close proximity thereto.  There were no abnormalities seen on ultrasound; a digital image was taken; and the patient tolerated the procedure with no complications. Images:still images obtained (picture printed and placed in patients chart) Laterality:right Block Type:sciatic and popliteal Injection Technique:single-shot Needle Type:echogenic Resistance on Injection: none Medications Used: ropivacaine (NAROPIN) injection 0.5 %, 20 mL Med administered at 9/21/2022 6:51 AM Post Assessment Injection Assessment: negative aspiration for heme, no paresthesia on injection and incremental injection Patient Tolerance:comfortable throughout block Complications:no        LABORATORY/CULTURE RESULTS:      PATHOLOGY RESULTS:       ASSESSMENT/PLAN     Diagnoses and all orders for this visit:    1. Bunion, right foot  -     ceFAZolin in 0.9% normal saline (ANCEF) IVPB solution 2 g  -     Pregnancy, Urine - Urine, Clean Catch; Standing  -     Pregnancy, Urine - Urine, Clean Catch    2. Pes planus of both feet  -     ceFAZolin in 0.9% normal saline (ANCEF) IVPB solution 2 g  -     Pregnancy, Urine - Urine, Clean Catch; Standing  -     Pregnancy, Urine - Urine, Clean Catch    3. Posterior tibial tendon dysfunction (PTTD) of right lower extremity  -     ceFAZolin in 0.9% normal saline (ANCEF) IVPB solution 2 g  -     Pregnancy, Urine - Urine, Clean Catch; Standing  -     Pregnancy, Urine - Urine, Clean Catch    Other orders  -     Follow Anesthesia Guidelines / Protocol; Standing  -     Nerve Block; Standing  -     Verify NPO Status; Standing  -     Instructions on coughing, deep breathing, and incentive spirometry.; Standing  -     Notify Physician - Standard; Standing  -     Obtain Informed Consent (If Not Done Inpatient or PAT); Standing  -     Follow Anesthesia Guidelines / Protocol  -     Nerve Block  -     Verify NPO Status  -     Instructions on coughing, deep breathing, and incentive spirometry.  -     Instructions on coughing, deep breathing, and incentive spirometry.  -     Instructions on coughing, deep breathing, and incentive spirometry.  -     Instructions on coughing, deep breathing, and incentive spirometry.  -     Instructions on coughing, deep breathing, and incentive spirometry.  -     Notify Physician - Standard  -     Obtain Informed Consent (If Not Done Inpatient or PAT)  -     Initiate Anesthesia Protocol; Standing  -     Pregnancy, Urine - Urine, Clean Catch; Standing  -     Insert Peripheral IV; Standing  -     lidocaine PF 1% (XYLOCAINE) injection 0.5 mL  -     Cancel: Maintain IV Access; Standing  -     sodium chloride 0.9 % flush 3 mL  -     lactated ringers  infusion 1,000 mL  -     Initiate Anesthesia Protocol  -     Pregnancy, Urine - Urine, Clean Catch  -     Insert Peripheral IV  -     Cancel: Maintain IV Access  -     Instructions on coughing, deep breathing, and incentive spirometry.  -     Vital Signs - Per Anesthesia Protocol; Standing  -     Oxygen Therapy- Nasal Cannula; Titrate for SPO2: equal to or greater than, 90%; Standing  -     Pulse Oximetry, Continuous; Standing  -     Insert Peripheral IV; Standing  -     Saline Lock & Maintain IV Access; Standing  -     sodium chloride 0.9 % flush 3 mL  -     sodium chloride 0.9 % flush 3-10 mL  -     lidocaine PF 1% (XYLOCAINE) injection 0.5 mL  -     lactated ringers infusion  -     acetaminophen (TYLENOL) tablet 1,000 mg  -     midazolam (VERSED) injection 1 mg  -     dexamethasone (DECADRON) injection 4 mg  -     midazolam (VERSED) injection 2 mg  -     fentaNYL citrate (PF) (SUBLIMAZE) injection 50 mcg  -     Oxygen Therapy- Nasal Cannula; Titrate for SPO2: equal to or greater than, 90%  -     Pulse Oximetry, Continuous  -     Insert Peripheral IV  -     Saline Lock & Maintain IV Access      Comprehensive lower extremity examination and evaluation was performed.  Discussed findings and treatment plan including risks, benefits, and treatment options with patient in detail. Patient agreed with treatment plan.  Again reviewed x-rays with patient.   MRI ordered for further evaluation.  Patient wishes to forego additional conservative therapy and proceed with surgical intervention.  Patient wishes to proceed with joint sparing procedures if possible.   Patient will be initially scheduled for cataract anemias recession versus Achilles tendon lengthening, Schmidt calcaneal osteotomy, cotton osteotomy, Emil bunionectomy, possible tibial bone graft harvesting, and possible posterior tibial tendon repair/augmentation.  All options, benefits, and risks associate with surgery were discussed with the patient including but  not limited to: Standard risk of anesthesia, pain, bleeding, infection, nonhealing/dehiscence, nonunion, deformity, loss of limb or life.  Pre and postoperative courses were discussed in detail including minimum 6 to 8 weeks nonweightbearing status postoperatively.  No guarantees were inferred.  Patient will tentatively be scheduled for postoperative admission for pain control.  An After Visit Summary was printed and given to the patient at discharge, including (if requested) any available informative/educational handouts regarding diagnosis, treatment, or medications. All questions were answered to patient/family satisfaction. Should symptoms fail to improve or worsen they agree to call or return to clinic or to go to the Emergency Department. Discussed the importance of following up with any needed screening tests/labs/specialist appointments and any requested follow-up recommended by me today. Importance of maintaining follow-up discussed and patient accepts that missed appointments can delay diagnosis and potentially lead to worsening of conditions.  No follow-ups on file., or sooner if acute issues arise.        This document has been electronically signed by Jerry Adrian DPM on September 21, 2022 07:05 [facility] - PODIATRY    Today's Date: 09/21/22    Patient Name: Nicole AGUILAR  MRN: [id number]  CSN: [id number]  PCP: Anthony Lawler DO  Referring Provider: Jerry Adrian DPM    SUBJECTIVE     No chief complaint on file.    HPI: Nicole AGUILAR, a 37 y.o.female, comes to clinic as a(n) established patient for follow-up treatment of foot and ankle pain. Patient has h/o ADHD, foot drop, herniation of intervertebral disc, IBS, radiculopathy, sciatica. Patient presents for follow-up of right foot and ankle pain. Has been wearing [place] brace since last appointment and states that she initially had some improvement but feels like symptoms are worsening again. Feels like brace  helps with balance but is not helping with pain. Had spinal fusion in December and was hoping to be farther from spinal surgery prior to consideration of foot surgery but she states that at this point she feels like symptoms warrant correction.  Relates that due to the chronic pain in her foot, she is now interested in having surgical correction of deformities to improve pain and ambulation.  Relates that she continues to have issues with foot drop of the left foot and is wearing an AFO.  Admits pain at 8/10 level and described as aching, throbbing, numbness and tingling. Relates previous treatment(s) including CAM boot, brace, anti-inflammatories. Denies any constitutional symptoms. No other pedal complaints at this time.    Past Medical History:   Diagnosis Date   • ADHD (attention deficit hyperactivity disorder)     pt states as a child   • DDD (degenerative disc disease), lumbar    • Foot drop, left foot    • Herniation of intervertebral disc between L5 and S1    • History of back surgery     a lift and fusion   • Irritable bowel syndrome    • Lumbosacral radiculopathy 10/16/2020   • Sciatic nerve pain, left      Past Surgical History:   Procedure Laterality Date   • APPENDECTOMY     • COLONOSCOPY  07/14/2016    Hemorrhoids otherwise normal exam repeat in 10 years   • ENDOSCOPY  07/14/2016    LA Grade A reflux esophagitis   • LUMBAR DISCECTOMY Left 10/16/2020    Procedure: LEFT L5-S1 MICRODISCECTOMY;  Surgeon: RUBIA Min MD;  Location: [facility];  Service: Orthopedic Spine;  Laterality: Left;   • LUMBAR FUSION       Family History   Problem Relation Age of Onset   • No Known Problems Mother    • COPD Father    • Heart disease Father    • Diabetes Father    • Heart failure Father    • Atrial fibrillation Father    • Colon cancer Neg Hx    • Colon polyps Neg Hx      Social History     Socioeconomic History   • Marital status:    Tobacco Use   • Smoking status: Former Smoker     Packs/day: 0.50      Years: 22.00     Pack years: 11.00     Types: Cigarettes     Quit date: 10/1/2020     Years since quittin.9   • Smokeless tobacco: Never Used   Vaping Use   • Vaping Use: Former   • Substances: Nicotine   • Devices: Disposable, Pre-filled or refillable cartridge   • Passive vaping exposure: Yes   Substance and Sexual Activity   • Alcohol use: Yes     Comment: occ 3 to 4 glasses of wine a week   • Drug use: No   • Sexual activity: Yes     Partners: Male     Allergies   Allergen Reactions   • Doxycycline Anaphylaxis     ALL CYCLINES    • Latex Rash     Current Facility-Administered Medications   Medication Dose Route Frequency Provider Last Rate Last Admin   • ceFAZolin in 0.9% normal saline (ANCEF) IVPB solution 2 g  2 g Intravenous Once Jerry Adrian DPM       • lactated ringers infusion 1,000 mL  1,000 mL Intravenous Continuous Jerry Adrian DPM 25 mL/hr at 22 0612 1,000 mL at 22 0612   • lactated ringers infusion  100 mL/hr Intravenous Continuous Coral Zazueta MD       • lidocaine PF 1% (XYLOCAINE) injection 0.5 mL  0.5 mL Intradermal Once PRN Jerry Adrian DPM       • lidocaine PF 1% (XYLOCAINE) injection 0.5 mL  0.5 mL Injection Once PRN Coral Zazueta MD       • midazolam (VERSED) injection 1 mg  1 mg Intravenous Q10 Min PRN Coral Zazueta MD       • sodium chloride 0.9 % flush 3 mL  3 mL Intravenous PRN Jerry Adrian DPM       • sodium chloride 0.9 % flush 3 mL  3 mL Intravenous Q12H Coral Zazueta MD       • sodium chloride 0.9 % flush 3-10 mL  3-10 mL Intravenous PRN Coral Zazueta MD         Review of Systems   Constitutional: Negative for chills and fever.   HENT: Negative for congestion.    Respiratory: Negative for shortness of breath.    Cardiovascular: Negative for chest pain and leg swelling.   Gastrointestinal: Negative for constipation, diarrhea, nausea and vomiting.   Musculoskeletal: Positive for back pain and gait  problem.        Foot pain   Skin: Negative for wound.   Neurological: Positive for weakness and numbness.       OBJECTIVE     Vitals:    09/21/22 0650   BP: 98/75   Pulse: 107   Resp: 16   Temp:    SpO2: 97%       PHYSICAL EXAM  GEN:   Accompanied by none.     Physical Exam  Vitals reviewed.   Constitutional:       Appearance: Normal appearance. She is well-developed. She is obese.   HENT:      Head: Normocephalic and atraumatic.      Right Ear: Tympanic membrane normal.      Left Ear: Tympanic membrane normal.      Nose: Nose normal.      Mouth/Throat:      Pharynx: Oropharynx is clear.   Eyes:      Extraocular Movements: Extraocular movements intact.      Pupils: Pupils are equal, round, and reactive to light.   Cardiovascular:      Rate and Rhythm: Normal rate and regular rhythm.      Pulses: Normal pulses.           Dorsalis pedis pulses are 2+ on the right side and 2+ on the left side.        Posterior tibial pulses are 2+ on the right side and 2+ on the left side.      Heart sounds: Normal heart sounds.   Pulmonary:      Effort: Pulmonary effort is normal.      Breath sounds: Normal breath sounds.   Abdominal:      General: Bowel sounds are normal.      Palpations: Abdomen is soft.   Musculoskeletal:      Cervical back: Normal range of motion and neck supple.      Right ankle:  over the medial malleolus.      Left ankle:  over the lateral malleolus.      Right foot: Bunion (Mild medial eminence with slight abduction of the hallux.  Able to reduce.  No crepitus.  Small cystic type lesion along the proximal medial aspect.) present.   Feet:      Right foot:      Protective Sensation: 10 sites sensed.      Skin integrity: Warmth present.      Toenail Condition: Right toenails are normal.      Left foot:      Protective Sensation: 7 sites sensed.      Skin integrity: Warmth present.      Toenail Condition: Left toenails are normal.   Neurological:      General: No focal deficit present.      Mental Status: She is  alert and oriented to person, place, and time. Mental status is at baseline.   Psychiatric:         Mood and Affect: Mood normal.         Behavior: Behavior normal.         Thought Content: Thought content normal.         Judgment: Judgment normal.         Foot/Ankle Exam:       General:   Appearance: appears stated age and healthy and obesity    Orientation: AAOx3    Affect: appropriate    Gait: antalgic    Assistance: cane    Shoe Gear:  Casual shoes (Left AFO)    VASCULAR      Right Foot Vascularity   Dorsalis pedis:  2+  Posterior tibial:  2+  Skin Temperature: warm    Edema Grading:  None  CFT:  3  Pedal Hair Growth:  Present  Varicosities: none       Left Foot Vascularity   Dorsalis pedis:  2+  Posterior tibial:  2+  Skin Temperature: warm    Edema Grading:  None  CFT:  3  Pedal Hair Growth:  Present  Varicosities: none        NEUROLOGIC     Right Foot Neurologic   Normal sensation    Light touch sensation:  Normal  Vibratory sensation:  Normal  Hot/Cold sensation: normal    Protective Sensation using [place]-Zaina Monofilament:  10     Left Foot Neurologic   Light touch sensation:  Diminished  Vibratory sensation:  Diminished  Hot/cold sensation: diminished    Protective Sensation using Mendon-Zaina Monofilament:  7     MUSCULOSKELETAL      Right Foot Musculoskeletal   Ecchymosis:  None  Tenderness: great toe metatarsophalangeal joint, medial malleolus, subtalar joint and posterior tibial tendon    Arch:  Pes planus (Severe decrease in arch height with pronation upon weightbearing.  Mild eversion of the heel and lateral mid foot deviation.  Able to mostly reduce the deformity.  No significant crepitus.)  Hallux valgus: Yes (Mild medial eminence with slight abduction of the hallux.  Able to reduce.  No crepitus.  Small cystic type lesion along the proximal medial aspect.)       Left Foot Musculoskeletal   Ecchymosis:  None  Tenderness: lateral malleolus and peroneal tendon    Tenderness comment:   ATFL  Arch:  Pes planus     MUSCLE STRENGTH     Right Foot Muscle Strength   Foot dorsiflexion:  5  Foot plantar flexion:  5  Foot inversion:  5  Foot eversion:  5     Left Foot Muscle Strength   Foot dorsiflexion:  4-  Foot plantar flexion:  5  Foot inversion:  4-  Foot eversion:  4-     RANGE OF MOTION      Right Foot Range of Motion   Ankle dorsiflexion:  5  ankle dorsiflexion pain    plantar flexion pain    foot eversion pain    foot inversion pain       Left Foot Range of Motion   Foot and ankle ROM within normal limits    active eversion pain    active inversion pain       DERMATOLOGIC     Right Foot Dermatologic   Skin: skin intact    Nails: normal       Left Foot Dermatologic   Skin: skin intact    Nails: normal        RADIOLOGY/NUCLEAR:  XR Chest 2 View    Result Date: 9/19/2022  Narrative: EXAM/TECHNIQUE: XR CHEST 2 VW-  INDICATION: Pre op; M76.821-Posterior tibial tendinitis, right leg; M21.41-Flat foot (pes planus) (acquired), right foot; M21.42-Flat foot (pes planus) (acquired), left foot; M21.611-Bunion of right foot  COMPARISON: 6/23/2021  FINDINGS:  Cardiomediastinal silhouette is within normal limits. No pleural effusion or pneumothorax. No focal consolidation. No acute osseous findings.      Impression:  No acute findings. This report was finalized on 09/19/2022 10:20 by Dr. Peter Jacobs MD.    Peripheral Block    Result Date: 9/21/2022  Narrative: Dimitri Roy MD     9/21/2022  6:56 AM Peripheral Block Pre-sedation assessment completed: 9/21/2022 6:47 AM Patient reassessed immediately prior to procedure Patient location during procedure: holding area Stop time: 9/21/2022 6:53 AM Reason for block: procedure for pain, at surgeon's request, post-op pain management and Requested by Dr. Adrian Performed by Anesthesiologist: Dimitri Roy MD Preanesthetic Checklist Completed: patient identified, IV checked, site marked, risks and benefits discussed, surgical consent, monitors and equipment  checked, pre-op evaluation and timeout performed Prep: Pt Position: supine Sterile barriers:mask, gloves, cap and washed/disinfected hands Prep: ChloraPrep Patient monitoring: blood pressure monitoring, continuous pulse oximetry and EKG Procedure Sedation: yes Guidance:ultrasound guided and femoral artery identified in adductor canal and local anesthetic seen surrounding artery ULTRASOUND INTERPRETATION.  Using ultrasound guidance a 20 G gauge needle was placed in close proximity to the femoral nerve, at which point, under ultrasound guidance anesthetic was injected in the area of the nerve and spread of the anesthesia was seen on ultrasound in close proximity thereto.  There were no abnormalities seen on ultrasound; a digital image was taken; and the patient tolerated the procedure with no complications. Images:still images obtained (picture printed and placed in patients chart) Laterality:right Block Type:adductor canal block Injection Technique:single-shot Needle Type:echogenic Resistance on Injection: none Medications Used: ropivacaine (NAROPIN) injection 0.5 %, 20 mL Med administered at 9/21/2022 6:53 AM Post Assessment Injection Assessment: negative aspiration for heme, no paresthesia on injection and incremental injection Patient Tolerance:comfortable throughout block Complications:no     Peripheral Block    Result Date: 9/21/2022  Narrative: Dimitri Roy MD     9/21/2022  6:55 AM Peripheral Block Pre-sedation assessment completed: 9/21/2022 6:47 AM Patient reassessed immediately prior to procedure Patient location during procedure: holding area Stop time: 9/21/2022 6:51 AM Reason for block: procedure for pain, at surgeon's request, post-op pain management and Requested by Dr. Adrain Performed by Anesthesiologist: Dimitri Roy MD Preanesthetic Checklist Completed: patient identified, IV checked, site marked, risks and benefits discussed, surgical consent, monitors and equipment checked, pre-op evaluation  and timeout performed Prep: Pt Position: supine Sterile barriers:mask, gloves, cap and washed/disinfected hands Prep: ChloraPrep Patient monitoring: blood pressure monitoring, continuous pulse oximetry and EKG Procedure Sedation: yes Performed under: MAC Guidance:ultrasound guided and Sciatic nerve identified and local anesthetic seen surrounding nerve ULTRASOUND INTERPRETATION.  Using ultrasound guidance a 20 G gauge needle was placed in close proximity to the sciatic nerve, at which point, under ultrasound guidance anesthetic was injected in the area of the nerve and spread of the anesthesia was seen on ultrasound in close proximity thereto.  There were no abnormalities seen on ultrasound; a digital image was taken; and the patient tolerated the procedure with no complications. Images:still images obtained (picture printed and placed in patients chart) Laterality:right Block Type:sciatic and popliteal Injection Technique:single-shot Needle Type:echogenic Resistance on Injection: none Medications Used: ropivacaine (NAROPIN) injection 0.5 %, 20 mL Med administered at 9/21/2022 6:51 AM Post Assessment Injection Assessment: negative aspiration for heme, no paresthesia on injection and incremental injection Patient Tolerance:comfortable throughout block Complications:no       LABORATORY/CULTURE RESULTS:      PATHOLOGY RESULTS:       ASSESSMENT/PLAN     Diagnoses and all orders for this visit:    1. Bunion, right foot  -     ceFAZolin in 0.9% normal saline (ANCEF) IVPB solution 2 g  -     Pregnancy, Urine - Urine, Clean Catch; Standing  -     Pregnancy, Urine - Urine, Clean Catch    2. Pes planus of both feet  -     ceFAZolin in 0.9% normal saline (ANCEF) IVPB solution 2 g  -     Pregnancy, Urine - Urine, Clean Catch; Standing  -     Pregnancy, Urine - Urine, Clean Catch    3. Posterior tibial tendon dysfunction (PTTD) of right lower extremity  -     ceFAZolin in 0.9% normal saline (ANCEF) IVPB solution 2 g  -      Pregnancy, Urine - Urine, Clean Catch; Standing  -     Pregnancy, Urine - Urine, Clean Catch    Other orders  -     Follow Anesthesia Guidelines / Protocol; Standing  -     Nerve Block; Standing  -     Verify NPO Status; Standing  -     Instructions on coughing, deep breathing, and incentive spirometry.; Standing  -     Notify Physician - Standard; Standing  -     Obtain Informed Consent (If Not Done Inpatient or PAT); Standing  -     Follow Anesthesia Guidelines / Protocol  -     Nerve Block  -     Verify NPO Status  -     Instructions on coughing, deep breathing, and incentive spirometry.  -     Instructions on coughing, deep breathing, and incentive spirometry.  -     Instructions on coughing, deep breathing, and incentive spirometry.  -     Instructions on coughing, deep breathing, and incentive spirometry.  -     Instructions on coughing, deep breathing, and incentive spirometry.  -     Notify Physician - Standard  -     Obtain Informed Consent (If Not Done Inpatient or PAT)  -     Initiate Anesthesia Protocol; Standing  -     Pregnancy, Urine - Urine, Clean Catch; Standing  -     Insert Peripheral IV; Standing  -     lidocaine PF 1% (XYLOCAINE) injection 0.5 mL  -     Cancel: Maintain IV Access; Standing  -     sodium chloride 0.9 % flush 3 mL  -     lactated ringers infusion 1,000 mL  -     Initiate Anesthesia Protocol  -     Pregnancy, Urine - Urine, Clean Catch  -     Insert Peripheral IV  -     Cancel: Maintain IV Access  -     Instructions on coughing, deep breathing, and incentive spirometry.  -     Vital Signs - Per Anesthesia Protocol; Standing  -     Oxygen Therapy- Nasal Cannula; Titrate for SPO2: equal to or greater than, 90%; Standing  -     Pulse Oximetry, Continuous; Standing  -     Insert Peripheral IV; Standing  -     Saline Lock & Maintain IV Access; Standing  -     sodium chloride 0.9 % flush 3 mL  -     sodium chloride 0.9 % flush 3-10 mL  -     lidocaine PF 1% (XYLOCAINE) injection 0.5  mL  -     lactated ringers infusion  -     acetaminophen (TYLENOL) tablet 1,000 mg  -     midazolam (VERSED) injection 1 mg  -     dexamethasone (DECADRON) injection 4 mg  -     midazolam (VERSED) injection 2 mg  -     fentaNYL citrate (PF) (SUBLIMAZE) injection 50 mcg  -     Oxygen Therapy- Nasal Cannula; Titrate for SPO2: equal to or greater than, 90%  -     Pulse Oximetry, Continuous  -     Insert Peripheral IV  -     Saline Lock & Maintain IV Access      Comprehensive lower extremity examination and evaluation was performed.  Discussed findings and treatment plan including risks, benefits, and treatment options with patient in detail. Patient agreed with treatment plan.  Again reviewed x-rays with patient.   Patient wishes to forego additional conservative therapy and proceed with surgical intervention.  Patient wishes to proceed with joint sparing procedures if possible.   Patient will be initially scheduled for gastrocnemius recession versus Achilles tendon lengthening, Schmidt calcaneal osteotomy, cotton osteotomy, Emil bunionectomy, possible tibial bone graft harvesting, and possible posterior tibial tendon repair/augmentation.  All options, benefits, and risks associate with surgery were discussed with the patient including but not limited to: Standard risk of anesthesia, pain, bleeding, infection, nonhealing/dehiscence, nonunion, deformity, loss of limb or life.  Pre and postoperative courses were discussed in detail including minimum 6 to 8 weeks nonweightbearing status postoperatively.  No guarantees were inferred.  Patient will tentatively be scheduled for postoperative admission for pain control.  After discussion with patient and having popliteal block, leaning towards d/c home after procedure.  An After Visit Summary was printed and given to the patient at discharge, including (if requested) any available informative/educational handouts regarding diagnosis, treatment, or medications. All questions  were answered to patient/family satisfaction. Should symptoms fail to improve or worsen they agree to call or return to clinic or to go to the Emergency Department. Discussed the importance of following up with any needed screening tests/labs/specialist appointments and any requested follow-up recommended by me today. Importance of maintaining follow-up discussed and patient accepts that missed appointments can delay diagnosis and potentially lead to worsening of conditions.  No follow-ups on file., or sooner if acute issues arise.        This document has been electronically signed by Jerry Adrian DPM on September 21, 2022 07:05 CDT

## 2022-09-21 NOTE — ANESTHESIA PROCEDURE NOTES
Peripheral Block    Pre-sedation assessment completed: 9/21/2022 6:47 AM    Patient reassessed immediately prior to procedure    Patient location during procedure: holding area  Stop time: 9/21/2022 6:51 AM  Reason for block: procedure for pain, at surgeon's request, post-op pain management and Requested by Dr. Adrian   Performed by  Anesthesiologist: Dimitri Roy MD  Preanesthetic Checklist  Completed: patient identified, IV checked, site marked, risks and benefits discussed, surgical consent, monitors and equipment checked, pre-op evaluation and timeout performed  Prep:  Pt Position: supine  Sterile barriers:mask, gloves, cap and washed/disinfected hands  Prep: ChloraPrep  Patient monitoring: blood pressure monitoring, continuous pulse oximetry and EKG  Procedure    Sedation: yes  Performed under: MAC  Guidance:ultrasound guided and Sciatic nerve identified and local anesthetic seen surrounding nerve    ULTRASOUND INTERPRETATION.  Using ultrasound guidance a 20 G gauge needle was placed in close proximity to the sciatic nerve, at which point, under ultrasound guidance anesthetic was injected in the area of the nerve and spread of the anesthesia was seen on ultrasound in close proximity thereto.  There were no abnormalities seen on ultrasound; a digital image was taken; and the patient tolerated the procedure with no complications. Images:still images obtained (picture printed and placed in patients chart)    Laterality:right  Block Type:sciatic and popliteal  Injection Technique:single-shot  Needle Type:echogenic  Resistance on Injection: none    Medications Used: ropivacaine (NAROPIN) injection 0.5 %, 20 mL  Med administered at 9/21/2022 6:51 AM      Post Assessment  Injection Assessment: negative aspiration for heme, no paresthesia on injection and incremental injection  Patient Tolerance:comfortable throughout block  Complications:no

## 2022-09-21 NOTE — TELEPHONE ENCOUNTER
Caller: PATIENT     Relationship to patient: SELF     Best call back number: [phone]    Patient is needing: PATIENT IS CALLING [facility] TO CHECK ON PRIOR AUTHORIZATION FOR PAIN MEDICATION. SHE HAD SURGERY WITH DR SIMMONS THIS MORNING. HER PHARMACY [facility] CLOSES AT 6:00PM SO SHE WOULD LIKE A CALL BACK BY 5:30. ATTEMPTED TO WT BUT THERE WAS NO ANSWER.

## 2022-09-21 NOTE — TELEPHONE ENCOUNTER
Reviewed guideline with caller, advises she call surgeon. States she has called the office and no one with his service is in the office.Called Dr. Killian's office they state Brittanie is working on PA.  Caller advised office is working on PA.     Reason for Disposition  • [1] Caller has URGENT question AND [2] triager unable to answer question    Additional Information  • Negative: [1] Major abdominal surgical incision AND [2] wound gaping open AND [3] visible internal organs  • Negative: Sounds like a life-threatening emergency to the triager  • Negative: Patient has a Negative Pressure Wound Therapy device  • Negative: Patient is followed by a wound clinic or wound specialist for this wound  • Negative: [1] Bleeding from incision AND [2] won't stop after 10 minutes of direct pressure  • Negative: [1] Widespread rash AND [2] bright red, sunburn-like  • Negative: Severe pain in the incision  • Negative: [1] Incision gaping open AND [2] < 48 hours since wound re-opened  • Negative: [1] Incision gaping open AND [2] length of opening > 2 inches (5 cm)  • Negative: Patient sounds very sick or weak to the triager  • Negative: Sounds like a serious complication to the triager  • Negative: Fever > 100.4 F (38.0 C)  • Negative: [1] Incision looks infected (spreading redness, pain) AND [2] fever > 99.5 F (37.5 C)  • Negative: [1] Incision looks infected (spreading redness, pain) AND [2] large red area (> 2 in. or 5 cm)  • Negative: [1] Incision looks infected (spreading redness, pain) AND [2] face wound  • Negative: [1] Red streak runs from the incision AND [2] longer than 1 inch (2.5 cm)  • Negative: [1] Pus or bad-smelling fluid draining from incision AND [2] no fever  • Negative: [1] Post-op pain AND [2] not controlled with pain medications  • Negative: Dressing soaked with blood or body fluid (e.g., drainage)  • Negative: [1] Raised bruise and [2] size > 2 inches (5 cm) and expanding    Answer Assessment - Initial Assessment  "Questions  1. SYMPTOM: \"What's the main symptom you're concerned about?\" (e.g., redness, pain, drainage)      Bleeding over where bone was taken  2. ONSET: \"When did bleeding  start?\"      As soon as she got home and used the knee scooter  3. SURGERY: \"What surgery was performed?\"      Right foot and ankle surgery   4. DATE of SURGERY: \"When was surgery performed?\"       This morning  5. INCISION SITE: \"Where is the incision located?\"       Right knee area just below knee   6. REDNESS: \"Is there any redness at the incision site?\" If yes, ask: \"How wide across is the redness?\" (Inches, centimeters)       no  7. PAIN: \"Is there any pain?\" If Yes, ask: \"How bad is it?\"  (Scale 1-10; or mild, moderate, severe)      No pain at present  8. BLEEDING: \"Is there any bleeding?\" If Yes, ask: \"How much?\" and \"Where?\"      Yes, seeped thru the layers but not dripping   9. DRAINAGE: \"Is there any drainage from the incision site?\" If yes, ask: \"What color and how much?\" (e.g., red, cloudy, pus; drops, teaspoon)      no  10. FEVER: \"Do you have a fever?\" If Yes, ask: \"What is your temperature, how was it measured, and when did it start?\"        no  11. OTHER SYMPTOMS: \"Do you have any other symptoms?\" (e.g., shaking chills, weakness, rash elsewhere on body)        no    Protocols used: POST-OP INCISION SYMPTOMS AND QUESTIONS-ADULT-AH      "

## 2022-09-21 NOTE — ANESTHESIA PROCEDURE NOTES
Airway  Urgency: elective    Date/Time: 9/21/2022 7:09 AM  Airway not difficult    General Information and Staff    Patient location during procedure: OR  CRNA/CAA: Soledad Leonard CRNA    Indications and Patient Condition  Indications for airway management: airway protection    Preoxygenated: yes  Mask difficulty assessment: 1 - vent by mask    Final Airway Details  Final airway type: endotracheal airway      Successful airway: ETT  Cuffed: yes   Successful intubation technique: direct laryngoscopy  Facilitating devices/methods: intubating stylet  Endotracheal tube insertion site: oral  Blade: Stevens  Blade size: 2  ETT size (mm): 7.0  Cormack-Lehane Classification: grade IIa - partial view of glottis  Placement verified by: chest auscultation and capnometry   Cuff volume (mL): 8  Measured from: lips  ETT/EBT  to lips (cm): 22  Number of attempts at approach: 1  Assessment: lips, teeth, and gum same as pre-op and atraumatic intubation

## 2022-09-21 NOTE — ANESTHESIA POSTPROCEDURE EVALUATION
Patient: Nicole AGUILAR    Procedure Summary     Date: 09/21/22 Room / Location: [facility] OR [address] [place] [address] PAD OR    Anesthesia Start: 0703 Anesthesia Stop: 1017    Procedures:       Gastrocnemius vs achilles tendon lengthening, Schmidt osteotomy, cotton osteotomy, Emil bunionectomy, possible tibial bone graft harvesting, and possible posterior tibial tendon advancement and repair- RIGHT (Right Ankle)      Gastrocnemius (Right Ankle)      Emil bunionectomy (Right Foot) Diagnosis:       Posterior tibial tendon dysfunction (PTTD) of right lower extremity      Pes planus of both feet      Bunion, right foot      (Posterior tibial tendon dysfunction (PTTD) of right lower extremity [M76.821])      (Pes planus of both feet [M21.41, M21.42])      (Bunion, right foot [M21.611])    Surgeons: Jerry Adrian DPM Provider: Soledad Leonard CRNA    Anesthesia Type: general with block ASA Status: 3          Anesthesia Type: general with block    Vitals  Vitals Value Taken Time   /63 09/21/22 1116   Temp 97.8 °F (36.6 °C) 09/21/22 1050   Pulse 106 09/21/22 1117   Resp 16 09/21/22 1100   SpO2 93 % 09/21/22 1117   Vitals shown include unvalidated device data.        Post Anesthesia Care and Evaluation    Patient location during evaluation: PACU  Patient participation: complete - patient participated  Level of consciousness: awake and alert  Pain management: adequate    Airway patency: patent  Anesthetic complications: No anesthetic complications    Cardiovascular status: acceptable  Respiratory status: acceptable  Hydration status: acceptable    Comments: Blood pressure 117/74, pulse 108, temperature 97.8 °F (36.6 °C), temperature source Temporal, resp. rate 16, weight 114 kg (250 lb 10.6 oz), SpO2 94 %, not currently breastfeeding.    Pt discharged from PACU based on nas score >8

## 2022-09-22 ENCOUNTER — TELEPHONE (OUTPATIENT)
Dept: PODIATRY | Facility: CLINIC | Age: 37
End: 2022-09-22

## 2022-09-22 NOTE — TELEPHONE ENCOUNTER
Spoke with pt, doing ok, bleeding up by knee over night but stopped now. Staying off it, keeping elevated, icing it, pain is tolerable

## 2022-09-27 NOTE — PROGRESS NOTES
[facility] - PODIATRY    Today's Date: 09/28/22    Patient Name: Nicole AGUILAR  MRN: [id number]  CSN: [id number]  PCP: Anthony Lawler DO  Referring Provider: No ref. provider found    SUBJECTIVE     Chief Complaint   Patient presents with   • Follow-up     Anthony Lawler DO pcp05/14/2021 1 WK POST OP - pt states doing good, pain is tolerable with meds. - pt pain 7/10 at worst last 3 days- pt presents in wheel chair, wrapped right foot post op, swelling     HPI: Nicole AGUILAR, a 37 y.o.female, comes to clinic as a(n) established patient for post-op appt 1 weeks s/p flatfoot reconstruction - right. Patient has h/o ADHD, foot drop, herniation of intervertebral disc, IBS, radiculopathy, sciatica. Relates that she has kept the bandage c/d/i. She has remained NWB. Notes moderate pain after surgery but has been improving.  Admits pain at 7/10 level and described as aching and throbbing. Relates previous treatment(s) including CAM boot, brace, anti-inflammatories. Denies any constitutional symptoms. No other pedal complaints at this time.    Past Medical History:   Diagnosis Date   • ADHD (attention deficit hyperactivity disorder)     pt states as a child   • DDD (degenerative disc disease), lumbar    • Foot drop, left foot    • Herniation of intervertebral disc between L5 and S1    • History of back surgery     a lift and fusion   • Irritable bowel syndrome    • Lumbosacral radiculopathy 10/16/2020   • Sciatic nerve pain, left      Past Surgical History:   Procedure Laterality Date   • ACHILLES TENDON SURGERY Right 9/21/2022    Procedure: Gastrocnemius vs achilles tendon lengthening, Schmidt osteotomy, cotton osteotomy, Emil bunionectomy, possible tibial bone graft harvesting, and possible posterior tibial tendon advancement and repair- RIGHT;  Surgeon: Jerry Adrian DPM;  Location: [facility];  Service: Podiatry;  Laterality: Right;   • APPENDECTOMY     • BUNIONECTOMY Right 9/21/2022     Procedure: Emil bunionectomy;  Surgeon: Jerry Adrian DPM;  Location:  [facility];  Service: Podiatry;  Laterality: Right;   • COLONOSCOPY  2016    Hemorrhoids otherwise normal exam repeat in 10 years   • ENDOSCOPY  2016    LA Grade A reflux esophagitis   • LUMBAR DISCECTOMY Left 10/16/2020    Procedure: LEFT L5-S1 MICRODISCECTOMY;  Surgeon: RUBIA Min MD;  Location:  [facility];  Service: Orthopedic Spine;  Laterality: Left;   • LUMBAR FUSION     • RECESSION GASTROCNEMIUS Right 2022    Procedure: Gastrocnemius;  Surgeon: Jerry Adrian DPM;  Location:  [facility];  Service: Podiatry;  Laterality: Right;     Family History   Problem Relation Age of Onset   • No Known Problems Mother    • COPD Father    • Heart disease Father    • Diabetes Father    • Heart failure Father    • Atrial fibrillation Father    • Colon cancer Neg Hx    • Colon polyps Neg Hx      Social History     Socioeconomic History   • Marital status:    Tobacco Use   • Smoking status: Former Smoker     Packs/day: 0.50     Years: 22.00     Pack years: 11.00     Types: Cigarettes     Quit date: 10/1/2020     Years since quittin.9   • Smokeless tobacco: Never Used   Vaping Use   • Vaping Use: Former   • Substances: Nicotine   • Devices: Disposable, Pre-filled or refillable cartridge   • Passive vaping exposure: Yes   Substance and Sexual Activity   • Alcohol use: Yes     Comment: occ 3 to 4 glasses of wine a week   • Drug use: No   • Sexual activity: Yes     Partners: Male     Allergies   Allergen Reactions   • Doxycycline Anaphylaxis     ALL CYCLINES    • Latex Rash     Current Outpatient Medications   Medication Sig Dispense Refill   • amitriptyline (ELAVIL) 100 MG tablet Take 100 mg by mouth Every Night.     • apixaban (ELIQUIS) 2.5 MG tablet tablet Take 1 tablet by mouth 2 (Two) Times a Day for 30 days. 60 tablet 0   • buPROPion (WELLBUTRIN) 100 MG tablet Take 100 mg by mouth 2 (Two) Times a Day.     •  Cariprazine HCl (VRAYLAR) 3 MG capsule capsule Take  by mouth Daily.     • Cholecalciferol (vitamin D3) 125 MCG (5000 UT) capsule capsule Take 5,000 Units by mouth Daily.     • cyclobenzaprine (FLEXERIL) 10 MG tablet Take 10 mg by mouth 3 (Three) Times a Day.     • DICLOFENAC PO Take 75 mg by mouth 2 (Two) Times a Day.     • docusate sodium (COLACE) 100 MG capsule Take 100 mg by mouth Every Night.     • docusate sodium (COLACE) 100 MG capsule Take 1 capsule by mouth Daily for 7 days. 7 capsule 0   • Ergocalciferol (VITAMIN D2 PO) Take 50,000 Units by mouth 1 (One) Time Per Week.     • gabapentin (NEURONTIN) 600 MG tablet Take 600 mg by mouth 3 (Three) Times a Day.     • HYDROcodone-acetaminophen (NORCO) 5-325 MG per tablet Take 1 tablet by mouth Every 8 (Eight) Hours As Needed for Moderate Pain.     • nystatin (MYCOSTATIN) 618182 UNIT/GM cream Apply 1 application topically to the appropriate area as directed 2 (Two) Times a Day.     • nystatin (MYCOSTATIN) 960824 UNIT/GM powder Apply 1 application topically to the appropriate area as directed 2 (Two) Times a Day.     • oxyCODONE-acetaminophen (PERCOCET) 7.5-325 MG per tablet Take 1 tablet by mouth Every 6 (Six) Hours As Needed (Pain). 28 tablet 0   • promethazine (PHENERGAN) 12.5 MG tablet Take 1 tablet by mouth Every 8 (Eight) Hours As Needed for Nausea or Vomiting for up to 7 days. 21 tablet 0   • sertraline (ZOLOFT) 100 MG tablet Take 100 mg by mouth Daily.     • triamcinolone (KENALOG) 0.1 % cream Apply 1 application topically to the appropriate area as directed 2 (Two) Times a Day.       No current facility-administered medications for this visit.     Review of Systems   Constitutional: Negative for chills and fever.   HENT: Negative for congestion.    Respiratory: Negative for shortness of breath.    Cardiovascular: Negative for chest pain and leg swelling.   Gastrointestinal: Negative for constipation, diarrhea, nausea and vomiting.   Musculoskeletal:  Positive for back pain and gait problem.        Foot pain   Skin: Positive for wound.   Neurological: Positive for weakness and numbness.       OBJECTIVE     Vitals:    22 0958   BP: 122/78   Pulse: 78   SpO2: 98%       PHYSICAL EXAM  GEN:   Accompanied by none.    Foot/Ankle Exam:       General:   Appearance: appears stated age and healthy and obesity    Orientation: AAOx3    Affect: appropriate    Assistance: wheelchair    Shoes: right posterior splint.    VASCULAR      Right Foot Vascularity   Dorsalis pedis:  2+  Posterior tibial:  2+  Skin Temperature: warm    Edema Gradin+ and non-pitting (forefoot)  CFT:  3  Pedal Hair Growth:  Present  Varicosities: none       Left Foot Vascularity   Dorsalis pedis:  2+  Posterior tibial:  2+  Skin Temperature: warm    Edema Grading:  None  CFT:  3  Pedal Hair Growth:  Present  Varicosities: none        NEUROLOGIC     Right Foot Neurologic   Normal sensation    Light touch sensation:  Normal  Vibratory sensation:  Normal  Hot/Cold sensation: normal    Protective Sensation using [place]a Monofilament:  10     Left Foot Neurologic   Light touch sensation:  Diminished  Vibratory sensation:  Diminished  Hot/cold sensation: diminished    Protective Sensation using [place]-Zaina Monofilament:  7     MUSCULOSKELETAL      Right Foot Musculoskeletal   Right foot ecchymosis: posterior calf.  Tenderness comment:  Moderately to surgical sites  Arch:  Normal  Hallux valgus: No       Left Foot Musculoskeletal   Ecchymosis:  None  Tenderness: lateral malleolus and peroneal tendon    Tenderness comment:  ATFL  Arch:  Pes planus     MUSCLE STRENGTH     Right Foot Muscle Strength   Foot dorsiflexion:  5  Foot plantar flexion:  4-  Foot inversion:  5  Foot eversion:  5     Left Foot Muscle Strength   Foot dorsiflexion:  4-  Foot plantar flexion:  5  Foot inversion:  4-  Foot eversion:  4-     RANGE OF MOTION      Left Foot Range of Motion   Foot and ankle ROM within  normal limits    active eversion pain    active inversion pain       DERMATOLOGIC     Right Foot Dermatologic   Nails: normal       Left Foot Dermatologic   Skin: skin intact    Nails: normal        Right Foot Additional Comments Incisions to the right lower extremity with sutures intact. Overall healing well with no SOI or dehiscence.       RADIOLOGY/NUCLEAR:  XR Chest 2 View    Result Date: 9/19/2022  Narrative: EXAM/TECHNIQUE: XR CHEST 2 VW-  INDICATION: Pre op; M76.821-Posterior tibial tendinitis, right leg; M21.41-Flat foot (pes planus) (acquired), right foot; M21.42-Flat foot (pes planus) (acquired), left foot; M21.611-Bunion of right foot  COMPARISON: 6/23/2021  FINDINGS:  Cardiomediastinal silhouette is within normal limits. No pleural effusion or pneumothorax. No focal consolidation. No acute osseous findings.      Impression:  No acute findings. This report was finalized on 09/19/2022 10:20 by Dr. Peter Jacobs MD.    XR Foot 2 View Right    Result Date: 9/21/2022  Narrative: EXAMINATION: XR FOOT 2 VW RIGHT- 9/21/2022 1:33 PM CDT  HISTORY: post op; M21.611-Bunion of right foot; M21.41-Flat foot (pes planus) (acquired), right foot; M21.42-Flat foot (pes planus) (acquired), left foot; M76.821-Posterior tibial tendinitis, right leg.  REPORT: AP and lateral views of the right foot were obtained.  COMPARISON: Right foot x-rays 08/06/2021.  There are new postoperative changes, including distal first metatarsal osteotomy with 2 compression screws, osteotomy and fusion at the level of the medial cuneiform and distal calcaneal osteotomy with fusion hardware. There is partially 3 mm of offset at the distal first metatarsal osteotomy site. There appears to be interval correction of pes planus deformity. This report was finalized on 09/21/2022 13:36 by Dr. Chuy Espinal MD.    Peripheral Block    Result Date: 9/21/2022  Narrative: Dimitri Roy MD     9/21/2022  6:56 AM Peripheral Block Pre-sedation assessment  completed: 9/21/2022 6:47 AM Patient reassessed immediately prior to procedure Patient location during procedure: holding area Stop time: 9/21/2022 6:53 AM Reason for block: procedure for pain, at surgeon's request, post-op pain management and Requested by Dr. Adrian Performed by Anesthesiologist: Dimitri Roy MD Preanesthetic Checklist Completed: patient identified, IV checked, site marked, risks and benefits discussed, surgical consent, monitors and equipment checked, pre-op evaluation and timeout performed Prep: Pt Position: supine Sterile barriers:mask, gloves, cap and washed/disinfected hands Prep: ChloraPrep Patient monitoring: blood pressure monitoring, continuous pulse oximetry and EKG Procedure Sedation: yes Guidance:ultrasound guided and femoral artery identified in adductor canal and local anesthetic seen surrounding artery ULTRASOUND INTERPRETATION.  Using ultrasound guidance a 20 G gauge needle was placed in close proximity to the femoral nerve, at which point, under ultrasound guidance anesthetic was injected in the area of the nerve and spread of the anesthesia was seen on ultrasound in close proximity thereto.  There were no abnormalities seen on ultrasound; a digital image was taken; and the patient tolerated the procedure with no complications. Images:still images obtained (picture printed and placed in patients chart) Laterality:right Block Type:adductor canal block Injection Technique:single-shot Needle Type:echogenic Resistance on Injection: none Medications Used: ropivacaine (NAROPIN) injection 0.5 %, 20 mL Med administered at 9/21/2022 6:53 AM Post Assessment Injection Assessment: negative aspiration for heme, no paresthesia on injection and incremental injection Patient Tolerance:comfortable throughout block Complications:no     Peripheral Block    Result Date: 9/21/2022  Narrative: Dimitri Roy MD     9/21/2022  6:55 AM Peripheral Block Pre-sedation assessment completed: 9/21/2022 6:47  AM Patient reassessed immediately prior to procedure Patient location during procedure: holding area Stop time: 9/21/2022 6:51 AM Reason for block: procedure for pain, at surgeon's request, post-op pain management and Requested by Dr. Adrian Performed by Anesthesiologist: Dimitri Roy MD Preanesthetic Checklist Completed: patient identified, IV checked, site marked, risks and benefits discussed, surgical consent, monitors and equipment checked, pre-op evaluation and timeout performed Prep: Pt Position: supine Sterile barriers:mask, gloves, cap and washed/disinfected hands Prep: ChloraPrep Patient monitoring: blood pressure monitoring, continuous pulse oximetry and EKG Procedure Sedation: yes Performed under: MAC Guidance:ultrasound guided and Sciatic nerve identified and local anesthetic seen surrounding nerve ULTRASOUND INTERPRETATION.  Using ultrasound guidance a 20 G gauge needle was placed in close proximity to the sciatic nerve, at which point, under ultrasound guidance anesthetic was injected in the area of the nerve and spread of the anesthesia was seen on ultrasound in close proximity thereto.  There were no abnormalities seen on ultrasound; a digital image was taken; and the patient tolerated the procedure with no complications. Images:still images obtained (picture printed and placed in patients chart) Laterality:right Block Type:sciatic and popliteal Injection Technique:single-shot Needle Type:echogenic Resistance on Injection: none Medications Used: ropivacaine (NAROPIN) injection 0.5 %, 20 mL Med administered at 9/21/2022 6:51 AM Post Assessment Injection Assessment: negative aspiration for heme, no paresthesia on injection and incremental injection Patient Tolerance:comfortable throughout block Complications:no       LABORATORY/CULTURE RESULTS:      PATHOLOGY RESULTS:       ASSESSMENT/PLAN     Diagnoses and all orders for this visit:    1. S/P foot surgery (Primary)  -     oxyCODONE-acetaminophen  (PERCOCET) 7.5-325 MG per tablet; Take 1 tablet by mouth Every 6 (Six) Hours As Needed (Pain).  Dispense: 28 tablet; Refill: 0    2. Post-op pain      Comprehensive lower extremity examination and evaluation was performed.  Discussed findings and treatment plan including risks, benefits, and treatment options with patient in detail. Patient agreed with treatment plan.  Reviewed post-op xrays with patient.   Bandage removed and surgical sites evaluated. Healing well.   Reapplied similar bandage including splint.   Continue current post-op instructions including NWB status.   Additional pain medication given.   An After Visit Summary was printed and given to the patient at discharge, including (if requested) any available informative/educational handouts regarding diagnosis, treatment, or medications. All questions were answered to patient/family satisfaction. Should symptoms fail to improve or worsen they agree to call or return to clinic or to go to the Emergency Department. Discussed the importance of following up with any needed screening tests/labs/specialist appointments and any requested follow-up recommended by me today. Importance of maintaining follow-up discussed and patient accepts that missed appointments can delay diagnosis and potentially lead to worsening of conditions.  Return in about 2 weeks (around 10/12/2022) for Post-Op appointment., or sooner if acute issues arise.        This document has been electronically signed by Jerry Adrian DPM on September 28, 2022 13:48 CDT

## 2022-09-28 ENCOUNTER — OFFICE VISIT (OUTPATIENT)
Dept: PODIATRY | Facility: CLINIC | Age: 37
End: 2022-09-28

## 2022-09-28 VITALS
WEIGHT: 250 LBS | SYSTOLIC BLOOD PRESSURE: 122 MMHG | HEART RATE: 78 BPM | DIASTOLIC BLOOD PRESSURE: 78 MMHG | BODY MASS INDEX: 41.65 KG/M2 | OXYGEN SATURATION: 98 % | HEIGHT: 65 IN

## 2022-09-28 DIAGNOSIS — Z98.890 S/P FOOT SURGERY: Primary | ICD-10-CM

## 2022-09-28 DIAGNOSIS — G89.18 POST-OP PAIN: ICD-10-CM

## 2022-09-28 PROCEDURE — 99024 POSTOP FOLLOW-UP VISIT: CPT | Performed by: PODIATRIST

## 2022-09-28 RX ORDER — OXYCODONE AND ACETAMINOPHEN 7.5; 325 MG/1; MG/1
1 TABLET ORAL EVERY 6 HOURS PRN
Qty: 28 TABLET | Refills: 0 | Status: SHIPPED | OUTPATIENT
Start: 2022-09-28

## 2022-10-04 ENCOUNTER — TELEPHONE (OUTPATIENT)
Dept: PODIATRY | Facility: CLINIC | Age: 37
End: 2022-10-04

## 2022-10-13 NOTE — PROGRESS NOTES
[facility] - PODIATRY    Today's Date: 10/14/22    Patient Name: Nicole AGUILAR  MRN: [id number]  CSN: [id number]  PCP: Anthony Lawler DO  Referring Provider: No ref. provider found    SUBJECTIVE     Chief Complaint   Patient presents with   • Follow-up     Anthony Lawler DO pcp05/14/2021 2 f/u 3 weeks for Post-Op appointment- pt states pt doing a lot better, pain almost gone other than the outside of right ankle and inside of right knee has hurt some- pt pain 6/10 at worst left ankle outside- pt presents in wheel chair, wrapped /splinted right foot, surgical area top/outside/inside of right foot and below right knee     HPI: Nicole AGUILAR, a 37 y.o.female, comes to clinic as a(n) established patient for post-op appt 3 weeks s/p flatfoot reconstruction - right. Patient has h/o ADHD, foot drop, herniation of intervertebral disc, IBS, radiculopathy, sciatica. Relates that she has kept the bandage c/d/i. She has remained NWB. Notes that pain has continued to improve with exception of an area on the posterior lateral ankle and some pain in the right knee.  Admits pain at 6/10 level and described as aching, nagging and throbbing. Relates previous treatment(s) including CAM boot, brace, anti-inflammatories. Denies any constitutional symptoms. No other pedal complaints at this time.    Past Medical History:   Diagnosis Date   • ADHD (attention deficit hyperactivity disorder)     pt states as a child   • DDD (degenerative disc disease), lumbar    • Foot drop, left foot    • Herniation of intervertebral disc between L5 and S1    • History of back surgery     a lift and fusion   • Irritable bowel syndrome    • Lumbosacral radiculopathy 10/16/2020   • Sciatic nerve pain, left      Past Surgical History:   Procedure Laterality Date   • ACHILLES TENDON SURGERY Right 9/21/2022    Procedure: Gastrocnemius vs achilles tendon lengthening, Schmidt osteotomy, cotton osteotomy, Emil bunionectomy, possible  tibial bone graft harvesting, and possible posterior tibial tendon advancement and repair- RIGHT;  Surgeon: Jerry Adrian DPM;  Location:  [facility];  Service: Podiatry;  Laterality: Right;   • APPENDECTOMY     • BUNIONECTOMY Right 2022    Procedure: Emil bunionectomy;  Surgeon: Jerry Adrian DPM;  Location:  [facility];  Service: Podiatry;  Laterality: Right;   • COLONOSCOPY  2016    Hemorrhoids otherwise normal exam repeat in 10 years   • ENDOSCOPY  2016    LA Grade A reflux esophagitis   • LUMBAR DISCECTOMY Left 10/16/2020    Procedure: LEFT L5-S1 MICRODISCECTOMY;  Surgeon: RUBIA Min MD;  Location:  [facility];  Service: Orthopedic Spine;  Laterality: Left;   • LUMBAR FUSION     • RECESSION GASTROCNEMIUS Right 2022    Procedure: Gastrocnemius;  Surgeon: Jerry Adrian DPM;  Location:  [facility];  Service: Podiatry;  Laterality: Right;     Family History   Problem Relation Age of Onset   • No Known Problems Mother    • COPD Father    • Heart disease Father    • Diabetes Father    • Heart failure Father    • Atrial fibrillation Father    • Colon cancer Neg Hx    • Colon polyps Neg Hx      Social History     Socioeconomic History   • Marital status:    Tobacco Use   • Smoking status: Former     Packs/day: 0.50     Years: 22.00     Pack years: 11.00     Types: Cigarettes     Quit date: 10/1/2020     Years since quittin.0   • Smokeless tobacco: Never   Vaping Use   • Vaping Use: Former   • Substances: Nicotine   • Devices: Disposable, Pre-filled or refillable cartridge   • Passive vaping exposure: Yes   Substance and Sexual Activity   • Alcohol use: Yes     Comment: occ 3 to 4 glasses of wine a week   • Drug use: No   • Sexual activity: Yes     Partners: Male     Allergies   Allergen Reactions   • Doxycycline Anaphylaxis     ALL CYCLINES    • Latex Rash     Current Outpatient Medications   Medication Sig Dispense Refill   • amitriptyline (ELAVIL) 100 MG tablet Take  100 mg by mouth Every Night.     • apixaban (ELIQUIS) 2.5 MG tablet tablet Take 1 tablet by mouth 2 (Two) Times a Day for 30 days. 60 tablet 0   • buPROPion (WELLBUTRIN) 100 MG tablet Take 100 mg by mouth 2 (Two) Times a Day.     • Cariprazine HCl (VRAYLAR) 3 MG capsule capsule Take  by mouth Daily.     • Cholecalciferol (vitamin D3) 125 MCG (5000 UT) capsule capsule Take 5,000 Units by mouth Daily.     • cyclobenzaprine (FLEXERIL) 10 MG tablet Take 10 mg by mouth 3 (Three) Times a Day.     • DICLOFENAC PO Take 75 mg by mouth 2 (Two) Times a Day.     • docusate sodium (COLACE) 100 MG capsule Take 100 mg by mouth Every Night.     • Ergocalciferol (VITAMIN D2 PO) Take 50,000 Units by mouth 1 (One) Time Per Week.     • gabapentin (NEURONTIN) 600 MG tablet Take 600 mg by mouth 3 (Three) Times a Day.     • HYDROcodone-acetaminophen (NORCO) 5-325 MG per tablet Take 1 tablet by mouth Every 8 (Eight) Hours As Needed for Moderate Pain.     • nystatin (MYCOSTATIN) 047860 UNIT/GM cream Apply 1 application topically to the appropriate area as directed 2 (Two) Times a Day.     • nystatin (MYCOSTATIN) 175439 UNIT/GM powder Apply 1 application topically to the appropriate area as directed 2 (Two) Times a Day.     • sertraline (ZOLOFT) 100 MG tablet Take 100 mg by mouth Daily.     • triamcinolone (KENALOG) 0.1 % cream Apply 1 application topically to the appropriate area as directed 2 (Two) Times a Day.     • oxyCODONE-acetaminophen (PERCOCET) 7.5-325 MG per tablet Take 1 tablet by mouth Every 6 (Six) Hours As Needed (Pain). 28 tablet 0     No current facility-administered medications for this visit.     Review of Systems   Constitutional: Negative for chills and fever.   HENT: Negative for congestion.    Respiratory: Negative for shortness of breath.    Cardiovascular: Negative for chest pain and leg swelling.   Gastrointestinal: Negative for constipation, diarrhea, nausea and vomiting.   Musculoskeletal: Positive for back pain  and gait problem.        Foot pain   Skin: Positive for wound.   Neurological: Positive for weakness and numbness.       OBJECTIVE     Vitals:    10/14/22 1443   Pulse: 106   SpO2: 96%       PHYSICAL EXAM  GEN:   Accompanied by none.    Foot/Ankle Exam:       General:   Appearance: appears stated age and healthy and obesity    Orientation: AAOx3    Affect: appropriate    Assistance: wheelchair    Shoe Gear:  Casual shoes (right posterior splint)    VASCULAR      Right Foot Vascularity   Dorsalis pedis:  2+  Posterior tibial:  2+  Skin Temperature: warm    Edema Gradin+ and non-pitting (focal to surgical sites)  CFT:  3  Pedal Hair Growth:  Present  Varicosities: none       Left Foot Vascularity   Dorsalis pedis:  2+  Posterior tibial:  2+  Skin Temperature: warm    Edema Grading:  None  CFT:  3  Pedal Hair Growth:  Present  Varicosities: none        NEUROLOGIC     Right Foot Neurologic   Normal sensation    Light touch sensation:  Normal  Vibratory sensation:  Normal  Hot/Cold sensation: normal    Protective Sensation using [place]a Monofilament:  10     Left Foot Neurologic   Light touch sensation:  Diminished  Vibratory sensation:  Diminished  Hot/cold sensation: diminished    Protective Sensation using [place]a Monofilament:  7     MUSCULOSKELETAL      Right Foot Musculoskeletal   Right foot ecchymosis: posterior calf.  Tenderness comment:  Mildly to surgical sites  Arch:  Normal  Hallux valgus: No       Left Foot Musculoskeletal   Ecchymosis:  None  Tenderness: lateral malleolus and peroneal tendon    Tenderness comment:  ATFL  Arch:  Pes planus     MUSCLE STRENGTH     Right Foot Muscle Strength   Foot dorsiflexion:  5  Foot plantar flexion:  4-  Foot inversion:  5  Foot eversion:  5     Left Foot Muscle Strength   Foot dorsiflexion:  4-  Foot plantar flexion:  5  Foot inversion:  4-  Foot eversion:  4-     RANGE OF MOTION      Left Foot Range of Motion   Foot and ankle ROM within normal  limits    active eversion pain    active inversion pain       DERMATOLOGIC     Right Foot Dermatologic   Skin: right foot skin not intact    Nails: normal       Left Foot Dermatologic   Skin: skin intact    Nails: normal        Right Foot Additional Comments Incisions to the right lower extremity with sutures intact. Upon removal of sutures, wounds are fully coapted without complication.      RADIOLOGY/NUCLEAR:  XR Chest 2 View    Result Date: 9/19/2022  Narrative: EXAM/TECHNIQUE: XR CHEST 2 VW-  INDICATION: Pre op; M76.821-Posterior tibial tendinitis, right leg; M21.41-Flat foot (pes planus) (acquired), right foot; M21.42-Flat foot (pes planus) (acquired), left foot; M21.611-Bunion of right foot  COMPARISON: 6/23/2021  FINDINGS:  Cardiomediastinal silhouette is within normal limits. No pleural effusion or pneumothorax. No focal consolidation. No acute osseous findings.      Impression:  No acute findings. This report was finalized on 09/19/2022 10:20 by Dr. Peter Jacobs MD.    XR Foot 2 View Right    Result Date: 9/21/2022  Narrative: EXAMINATION: XR FOOT 2 VW RIGHT- 9/21/2022 1:33 PM CDT  HISTORY: post op; M21.611-Bunion of right foot; M21.41-Flat foot (pes planus) (acquired), right foot; M21.42-Flat foot (pes planus) (acquired), left foot; M76.821-Posterior tibial tendinitis, right leg.  REPORT: AP and lateral views of the right foot were obtained.  COMPARISON: Right foot x-rays 08/06/2021.  There are new postoperative changes, including distal first metatarsal osteotomy with 2 compression screws, osteotomy and fusion at the level of the medial cuneiform and distal calcaneal osteotomy with fusion hardware. There is partially 3 mm of offset at the distal first metatarsal osteotomy site. There appears to be interval correction of pes planus deformity. This report was finalized on 09/21/2022 13:36 by Dr. Chuy Espinal MD.    Peripheral Block    Result Date: 9/21/2022  Narrative: Dimitri Roy MD     9/21/2022   6:56 AM Peripheral Block Pre-sedation assessment completed: 9/21/2022 6:47 AM Patient reassessed immediately prior to procedure Patient location during procedure: holding area Stop time: 9/21/2022 6:53 AM Reason for block: procedure for pain, at surgeon's request, post-op pain management and Requested by Dr. Adrian Performed by Anesthesiologist: Dimitri Roy MD Preanesthetic Checklist Completed: patient identified, IV checked, site marked, risks and benefits discussed, surgical consent, monitors and equipment checked, pre-op evaluation and timeout performed Prep: Pt Position: supine Sterile barriers:mask, gloves, cap and washed/disinfected hands Prep: ChloraPrep Patient monitoring: blood pressure monitoring, continuous pulse oximetry and EKG Procedure Sedation: yes Guidance:ultrasound guided and femoral artery identified in adductor canal and local anesthetic seen surrounding artery ULTRASOUND INTERPRETATION.  Using ultrasound guidance a 20 G gauge needle was placed in close proximity to the femoral nerve, at which point, under ultrasound guidance anesthetic was injected in the area of the nerve and spread of the anesthesia was seen on ultrasound in close proximity thereto.  There were no abnormalities seen on ultrasound; a digital image was taken; and the patient tolerated the procedure with no complications. Images:still images obtained (picture printed and placed in patients chart) Laterality:right Block Type:adductor canal block Injection Technique:single-shot Needle Type:echogenic Resistance on Injection: none Medications Used: ropivacaine (NAROPIN) injection 0.5 %, 20 mL Med administered at 9/21/2022 6:53 AM Post Assessment Injection Assessment: negative aspiration for heme, no paresthesia on injection and incremental injection Patient Tolerance:comfortable throughout block Complications:no     Peripheral Block    Result Date: 9/21/2022  Narrative: Dimitri Roy MD     9/21/2022  6:55 AM Peripheral Block  Pre-sedation assessment completed: 9/21/2022 6:47 AM Patient reassessed immediately prior to procedure Patient location during procedure: holding area Stop time: 9/21/2022 6:51 AM Reason for block: procedure for pain, at surgeon's request, post-op pain management and Requested by Dr. Adrian Performed by Anesthesiologist: Dimitri Roy MD Preanesthetic Checklist Completed: patient identified, IV checked, site marked, risks and benefits discussed, surgical consent, monitors and equipment checked, pre-op evaluation and timeout performed Prep: Pt Position: supine Sterile barriers:mask, gloves, cap and washed/disinfected hands Prep: ChloraPrep Patient monitoring: blood pressure monitoring, continuous pulse oximetry and EKG Procedure Sedation: yes Performed under: MAC Guidance:ultrasound guided and Sciatic nerve identified and local anesthetic seen surrounding nerve ULTRASOUND INTERPRETATION.  Using ultrasound guidance a 20 G gauge needle was placed in close proximity to the sciatic nerve, at which point, under ultrasound guidance anesthetic was injected in the area of the nerve and spread of the anesthesia was seen on ultrasound in close proximity thereto.  There were no abnormalities seen on ultrasound; a digital image was taken; and the patient tolerated the procedure with no complications. Images:still images obtained (picture printed and placed in patients chart) Laterality:right Block Type:sciatic and popliteal Injection Technique:single-shot Needle Type:echogenic Resistance on Injection: none Medications Used: ropivacaine (NAROPIN) injection 0.5 %, 20 mL Med administered at 9/21/2022 6:51 AM Post Assessment Injection Assessment: negative aspiration for heme, no paresthesia on injection and incremental injection Patient Tolerance:comfortable throughout block Complications:no       LABORATORY/CULTURE RESULTS:      PATHOLOGY RESULTS:       ASSESSMENT/PLAN     Diagnoses and all orders for this visit:    1. S/P foot  surgery (Primary)  -     XR Foot 3+ View Right; Future      Comprehensive lower extremity examination and evaluation was performed.  Discussed findings and treatment plan including risks, benefits, and treatment options with patient in detail. Patient agreed with treatment plan.  Bandage removed and site evaluated. Sutures removed without difficulty.   Short leg fiberglass cast applied for protection.   Plan to leave in cast for 5 weeks. Patient to return for cast removal and x-rays prior to next appointment.   X-rays of right foot prior to next appointment.    An After Visit Summary was printed and given to the patient at discharge, including (if requested) any available informative/educational handouts regarding diagnosis, treatment, or medications. All questions were answered to patient/family satisfaction. Should symptoms fail to improve or worsen they agree to call or return to clinic or to go to the Emergency Department. Discussed the importance of following up with any needed screening tests/labs/specialist appointments and any requested follow-up recommended by me today. Importance of maintaining follow-up discussed and patient accepts that missed appointments can delay diagnosis and potentially lead to worsening of conditions.  Return in about 5 weeks (around 11/18/2022) for Post-Op appointment., or sooner if acute issues arise.        This document has been electronically signed by Jerry Adrian DPM on October 14, 2022 15:05 CDT

## 2022-10-14 ENCOUNTER — OFFICE VISIT (OUTPATIENT)
Dept: PODIATRY | Facility: CLINIC | Age: 37
End: 2022-10-14

## 2022-10-14 ENCOUNTER — TELEMEDICINE (OUTPATIENT)
Dept: BARIATRICS/WEIGHT MGMT | Facility: CLINIC | Age: 37
End: 2022-10-14

## 2022-10-14 VITALS — OXYGEN SATURATION: 96 % | WEIGHT: 250 LBS | BODY MASS INDEX: 41.65 KG/M2 | HEART RATE: 106 BPM | HEIGHT: 65 IN

## 2022-10-14 DIAGNOSIS — F17.200 SMOKER: ICD-10-CM

## 2022-10-14 DIAGNOSIS — E66.01 CLASS 3 SEVERE OBESITY DUE TO EXCESS CALORIES WITH SERIOUS COMORBIDITY AND BODY MASS INDEX (BMI) OF 45.0 TO 49.9 IN ADULT: Primary | ICD-10-CM

## 2022-10-14 DIAGNOSIS — Z98.890 S/P FOOT SURGERY: Primary | ICD-10-CM

## 2022-10-14 PROCEDURE — 99213 OFFICE O/P EST LOW 20 MIN: CPT | Performed by: NURSE PRACTITIONER

## 2022-10-14 PROCEDURE — 99024 POSTOP FOLLOW-UP VISIT: CPT | Performed by: PODIATRIST

## 2022-10-14 NOTE — PROGRESS NOTES
Patient Care Team:  Anthony Lawler DO as PCP - General (Internal Medicine)  Janes Darby MD as Consulting Physician (Gastroenterology)    Reason for Visit:  Surgical Weight loss Vs Medical Weight Loss    You have chosen to receive care through a telehealth visit.  Do you consent to use a video/audio connection for your medical care today? Yes    Type of Visit: Video Visit  Location of Patient: Home  Location of Provider: [facility]   Nicole AGUILAR is a 37 y.o. female.     Nicole is here for follow-up and continued medical management of her morbid obesity.  She is currently on a prescription diet.  Nicole previously was to apply dietary changes such as following the meal plan as directed.  She admits to eating 3 meals per day.  As a result she is unsure if she has lost weight since her last visit.    She states she had foot/ankle surgery 3 weeks ago and has struggled following the meal plan during her recovery phase. She does use an Ensure max protein drink as a drink supplement.     Review Of Systems:  Review of Systems   Constitutional: Negative.    Respiratory: Negative.    Cardiovascular: Negative.    Gastrointestinal: Negative.    Endocrine: Negative.    Musculoskeletal: Positive for arthralgias and joint swelling.        Some pain from recent surgery    Psychiatric/Behavioral: Negative.          The following portions of the patient's history were reviewed and updated as appropriate: allergies, current medications, past family history, past medical history, past social history, past surgical history and problem list.    Objective   There were no vitals taken for this visit.  There were no vitals filed for this visit.    Physical Exam  Vitals reviewed.   Constitutional:       Appearance: She is obese.   Cardiovascular:      Rate and Rhythm: Normal rate and regular rhythm.   Pulmonary:      Effort: Pulmonary effort is normal.   Abdominal:      General: Bowel sounds are normal.       Palpations: Abdomen is soft.   Skin:     General: Skin is warm and dry.   Neurological:      Mental Status: She is alert and oriented to person, place, and time.   Psychiatric:         Mood and Affect: Mood normal.         Behavior: Behavior normal.         Class 3 Severe Obesity (BMI >=40). Obesity-related health conditions include the following: none. Obesity is unchanged. BMI is is above average; BMI management plan is completed. We discussed portion control and increasing exercise.    Unable to weigh due to being at home and having a recent surgery.    Assessment & Plan     Encounter Diagnoses   Name Primary?   • Class 3 severe obesity due to excess calories with serious comorbidity and body mass index (BMI) of 45.0 to 49.9 in adult (HCC) Yes   • Smoker        Nicole AGUILAR was seen today for follow-up, obesity, nutrition counseling and weight loss.  She has lost some weight since her last visit.  Today we discussed healthy changes in lifestyle, diet, and exercise. Dietician consultation obtained.  Nicole AGUILAR had received handouts to her explaining the recommendation on portion sizes/appetite control/reading nutrition labels.   Intensive behavioral therapy for obesity was done today as well.     Goals for this month are:   1. Increase vegetable intake.  I have encouraged patient to utilize frozen vegetables and steamer vegetables as needed.  2.  Encouraged to log meals and send food journal prior to next appointment.  Patient will see dietitian in 1 month.  Patient is traveling soon and has already began making plans to have meals that are compliant on the meal plan.  Encouraged to continue planning ahead.    Follow up in one month for a weight recheck. She currently wants to wait on proceeding with surgery and would like to remain in the medical weight loss program.

## 2022-10-14 NOTE — ASSESSMENT & PLAN NOTE
Patient's (There is no height or weight on file to calculate BMI.) indicates that they are morbidly obese (BMI > 40 or > 35 with obesity - related health condition) with health conditions that include none . Weight is improving with treatment. BMI is is above average; BMI management plan is completed. We discussed portion control and increasing exercise.

## 2022-11-10 NOTE — PROGRESS NOTES
[facility] - PODIATRY    Today's Date: 11/18/22    Patient Name: Nicole AGUILAR  MRN: [id number]  CSN: [id number]  PCP: Anhtony Lawler DO  Referring Provider: No ref. provider found    SUBJECTIVE     Chief Complaint   Patient presents with   • Follow-up     Anthony Lawler DO -10/14/2022-8 [facility] POST OP-pt states she is here for cast removal and a post op check-pt denies pain-pt presents with several pink surgical incisions, dryness to the area, one incision on the top has some scabbing     HPI: Nicole AGUILAR, a 37 y.o.female, comes to clinic as a(n) established patient for post-op appt 8 weeks s/p flatfoot reconstruction - right. Patient has h/o ADHD, foot drop, herniation of intervertebral disc, IBS, radiculopathy, sciatica. She has kept her bandage/cast c/d/i. She has remained NWB. Notes continued improvement without complications.  Denies pain. She has taken medications as prescribed. Denies any constitutional symptoms. No other pedal complaints at this time.    Past Medical History:   Diagnosis Date   • ADHD (attention deficit hyperactivity disorder)     pt states as a child   • Bunion    • Callus    • DDD (degenerative disc disease), lumbar    • Difficulty walking    • Fallen arches    • Foot drop, left foot    • Hammer toe    • Herniation of intervertebral disc between L5 and S1    • History of back surgery     a lift and fusion   • Irritable bowel syndrome    • Lumbosacral radiculopathy 10/16/2020   • Plantar fasciitis    • Sciatic nerve pain, left      Past Surgical History:   Procedure Laterality Date   • ACHILLES TENDON SURGERY Right 09/21/2022    Procedure: Gastrocnemius vs achilles tendon lengthening, Schmidt osteotomy, cotton osteotomy, Emil bunionectomy, possible tibial bone graft harvesting, and possible posterior tibial tendon advancement and repair- RIGHT;  Surgeon: Jerry Adrian DPM;  Location: [facility];  Service: Podiatry;  Laterality: Right;   • APPENDECTOMY     •  BUNIONECTOMY Right 2022    Procedure: Emil bunionectomy;  Surgeon: Jerry Adrian DPM;  Location:  [facility];  Service: Podiatry;  Laterality: Right;   • COLONOSCOPY  2016    Hemorrhoids otherwise normal exam repeat in 10 years   • ENDOSCOPY  2016    LA Grade A reflux esophagitis   • LUMBAR DISCECTOMY Left 10/16/2020    Procedure: LEFT L5-S1 MICRODISCECTOMY;  Surgeon: RUBIA Min MD;  Location:  [facility];  Service: Orthopedic Spine;  Laterality: Left;   • LUMBAR FUSION     • RECESSION GASTROCNEMIUS Right 2022    Procedure: Gastrocnemius;  Surgeon: Jerry Adrian DPM;  Location:  [facility];  Service: Podiatry;  Laterality: Right;     Family History   Problem Relation Age of Onset   • No Known Problems Mother    • COPD Father    • Heart disease Father         Late developing   • Diabetes Father         Late developing   • Heart failure Father    • Atrial fibrillation Father    • Hypertension Father    • Cancer Paternal Grandfather    • Colon cancer Neg Hx    • Colon polyps Neg Hx      Social History     Socioeconomic History   • Marital status:    Tobacco Use   • Smoking status: Former     Packs/day: 1.00     Years: 22.00     Pack years: 22.00     Types: Cigarettes, Electronic Cigarette     Quit date: 10/1/2020     Years since quittin.1     Passive exposure: Past   • Smokeless tobacco: Never   Vaping Use   • Vaping Use: Former   • Substances: Nicotine   • Devices: Disposable, Pre-filled or refillable cartridge   • Passive vaping exposure: Yes   Substance and Sexual Activity   • Alcohol use: Yes     Alcohol/week: 5.0 standard drinks     Types: 2 Shots of liquor, 3 Drinks containing 0.5 oz of alcohol per week     Comment: occ 3 to 4 glasses of wine a week   • Drug use: No   • Sexual activity: Yes     Partners: Male     Birth control/protection: I.U.D.     Allergies   Allergen Reactions   • Doxycycline Anaphylaxis     ALL CYCLINES    • Latex Rash     Current Outpatient  Medications   Medication Sig Dispense Refill   • amitriptyline (ELAVIL) 100 MG tablet Take 100 mg by mouth Every Night.     • buPROPion (WELLBUTRIN) 100 MG tablet Take 100 mg by mouth 2 (Two) Times a Day.     • Cariprazine HCl (VRAYLAR) 3 MG capsule capsule Take  by mouth Daily.     • cyclobenzaprine (FLEXERIL) 10 MG tablet Take 10 mg by mouth 3 (Three) Times a Day.     • docusate sodium (COLACE) 100 MG capsule Take 100 mg by mouth Every Night.     • Ergocalciferol (VITAMIN D2 PO) Take 50,000 Units by mouth 1 (One) Time Per Week.     • gabapentin (NEURONTIN) 600 MG tablet Take 600 mg by mouth 3 (Three) Times a Day.     • HYDROcodone-acetaminophen (NORCO) 5-325 MG per tablet Take 1 tablet by mouth Every 8 (Eight) Hours As Needed for Moderate Pain.     • nystatin (MYCOSTATIN) 326625 UNIT/GM cream Apply 1 application topically to the appropriate area as directed 2 (Two) Times a Day.     • nystatin (MYCOSTATIN) 331398 UNIT/GM powder Apply 1 application topically to the appropriate area as directed 2 (Two) Times a Day.     • sertraline (ZOLOFT) 100 MG tablet Take 100 mg by mouth Daily.     • triamcinolone (KENALOG) 0.1 % cream Apply 1 application topically to the appropriate area as directed 2 (Two) Times a Day.     • Cholecalciferol (vitamin D3) 125 MCG (5000 UT) capsule capsule Take 5,000 Units by mouth Daily.     • DICLOFENAC PO Take 75 mg by mouth 2 (Two) Times a Day.     • oxyCODONE-acetaminophen (PERCOCET) 7.5-325 MG per tablet Take 1 tablet by mouth Every 6 (Six) Hours As Needed (Pain). 28 tablet 0     No current facility-administered medications for this visit.     Review of Systems   Constitutional: Negative for chills and fever.   HENT: Negative for congestion.    Respiratory: Negative for shortness of breath.    Cardiovascular: Negative for chest pain and leg swelling.   Gastrointestinal: Negative for constipation, diarrhea, nausea and vomiting.   Musculoskeletal: Positive for back pain and gait problem.         Foot pain   Skin: Positive for wound.   Neurological: Positive for weakness and numbness.       OBJECTIVE     Vitals:    22 1433   BP: 122/78   Pulse: 113   SpO2: 99%       PHYSICAL EXAM  GEN:   Accompanied by none.    Foot/Ankle Exam:       General:   Appearance: appears stated age and healthy and obesity    Orientation: AAOx3    Affect: appropriate    Assistance: knee scooter    Shoes: right BK cast.    VASCULAR      Right Foot Vascularity   Dorsalis pedis:  2+  Posterior tibial:  2+  Skin Temperature: warm    Edema Gradin+ and non-pitting (focal to surgical sites)  CFT:  3  Pedal Hair Growth:  Present  Varicosities: none       Left Foot Vascularity   Dorsalis pedis:  2+  Posterior tibial:  2+  Skin Temperature: warm    Edema Grading:  None  CFT:  3  Pedal Hair Growth:  Present  Varicosities: none        NEUROLOGIC     Right Foot Neurologic   Normal sensation    Light touch sensation:  Normal  Vibratory sensation:  Normal  Hot/Cold sensation: normal    Protective Sensation using [place]-Zaina Monofilament:  10     Left Foot Neurologic   Light touch sensation:  Diminished  Vibratory sensation:  Diminished  Hot/cold sensation: diminished    Protective Sensation using [place]-Zaina Monofilament:  7     MUSCULOSKELETAL      Right Foot Musculoskeletal   Ecchymosis:  None  Tenderness comment:  Minimal to surgical sites  Arch:  Normal  Hallux valgus: No       Left Foot Musculoskeletal   Ecchymosis:  None  Tenderness: lateral malleolus and peroneal tendon    Tenderness comment:  ATFL  Arch:  Pes planus     MUSCLE STRENGTH     Right Foot Muscle Strength   Foot dorsiflexion:  5  Foot plantar flexion:  4+  Foot inversion:  5  Foot eversion:  5     Left Foot Muscle Strength   Foot dorsiflexion:  4-  Foot plantar flexion:  5  Foot inversion:  4-  Foot eversion:  4-     RANGE OF MOTION      Left Foot Range of Motion   Foot and ankle ROM within normal limits    active eversion pain    active inversion pain        DERMATOLOGIC     Right Foot Dermatologic   Skin: skin intact    Nails: normal       Left Foot Dermatologic   Skin: skin intact    Nails: normal        Right Foot Additional Comments Incisions to the right lower extremity fully coapted.       RADIOLOGY/NUCLEAR:  XR Foot 3+ View Right    Result Date: 11/18/2022  Narrative: HISTORY: Postop assessment right foot  Right foot: 3 views of the right foot are obtained.  Osteotomies and fusion involving the distal first metatarsal, medial cuneiform, and anterior calcaneus again noted. There is bony fusion along the medial cuneiform and distal first metatarsal sites. Partial bony fusion along the anterior calcaneus. No acute osseous pathology.      Impression: 1. Prior osteotomies and regional hardware fusion with bony fusion suspected at the first metatarsal and medial cuneiform sites. Partial bony fusion considered of the anterior calcaneus. No hardware complication identified This report was finalized on 11/18/2022 14:49 by Dr. Karime Quiñonez MD.      LABORATORY/CULTURE RESULTS:      PATHOLOGY RESULTS:       ASSESSMENT/PLAN     Diagnoses and all orders for this visit:    1. S/P foot surgery (Primary)  -     Ambulatory Referral to Physical Therapy Evaluate and treat, Ortho; Strengthening (Gradually increase WB from heel touch to FWB in boot over 6 weeks), ROM, Stretching  -     XR Foot 3+ View Right; Future      Comprehensive lower extremity examination and evaluation was performed.  Discussed findings and treatment plan including risks, benefits, and treatment options with patient in detail. Patient agreed with treatment plan.  Cast removed and patient sent for xrays.    Reviewed xrays with patient - osseus healing noted.   Applied CAM and compressigrip  Referral to PT for strengthening and transition to WB over the next 6 weeks.  WB X-rays of right foot prior to next appointment.    An After Visit Summary was printed and given to the patient at discharge, including  (if requested) any available informative/educational handouts regarding diagnosis, treatment, or medications. All questions were answered to patient/family satisfaction. Should symptoms fail to improve or worsen they agree to call or return to clinic or to go to the Emergency Department. Discussed the importance of following up with any needed screening tests/labs/specialist appointments and any requested follow-up recommended by me today. Importance of maintaining follow-up discussed and patient accepts that missed appointments can delay diagnosis and potentially lead to worsening of conditions.  Return in about 6 weeks (around 12/30/2022) for Post-Op appointment, Follow-up with Dr. Adrian., or sooner if acute issues arise.        This document has been electronically signed by Jerry Adrian DPM on November 18, 2022 15:07 CST

## 2022-11-17 ENCOUNTER — TELEPHONE (OUTPATIENT)
Dept: PODIATRY | Facility: CLINIC | Age: 37
End: 2022-11-17

## 2022-11-17 NOTE — TELEPHONE ENCOUNTER
Called patient regarding appt on 11/18/2022. Left message for patient to return call if any questions or concerns arise.

## 2022-11-18 ENCOUNTER — HOSPITAL ENCOUNTER (OUTPATIENT)
Dept: GENERAL RADIOLOGY | Facility: HOSPITAL | Age: 37
Discharge: HOME | End: 2022-11-18
Admitting: PODIATRIST

## 2022-11-18 ENCOUNTER — OFFICE VISIT (OUTPATIENT)
Dept: PODIATRY | Facility: CLINIC | Age: 37
End: 2022-11-18

## 2022-11-18 VITALS
WEIGHT: 250 LBS | OXYGEN SATURATION: 99 % | SYSTOLIC BLOOD PRESSURE: 122 MMHG | HEART RATE: 113 BPM | BODY MASS INDEX: 41.65 KG/M2 | HEIGHT: 65 IN | DIASTOLIC BLOOD PRESSURE: 78 MMHG

## 2022-11-18 DIAGNOSIS — Z98.890 S/P FOOT SURGERY: ICD-10-CM

## 2022-11-18 DIAGNOSIS — Z98.890 S/P FOOT SURGERY: Primary | ICD-10-CM

## 2022-11-18 PROCEDURE — 99024 POSTOP FOLLOW-UP VISIT: CPT | Performed by: PODIATRIST

## 2022-11-18 PROCEDURE — 73630 X-RAY EXAM OF FOOT: CPT

## 2022-12-19 NOTE — PROGRESS NOTES
[facility] - PODIATRY    Today's Date: 12/30/22    Patient Name: Nicole AGUILAR  MRN: [id number]  CSN: [id number]  PCP: Anthony Lawler DO  Referring Provider: No ref. provider found    SUBJECTIVE     Chief Complaint   Patient presents with   • Follow-up     Anthony Lawler DO-10/14/2022-  PO from 09/21/2022- pt states doing really well, still having some pain in the heel area and still having a lot of swelling. Doing massages at PT for the swelling- pt pain 5/10 at worst, achy tenderness     HPI: Nicole AGUILAR, a 37 y.o.female, comes to clinic as a(n) established patient for post-op appt 14 weeks s/p flatfoot reconstruction - right. Patient has h/o ADHD, foot drop, herniation of intervertebral disc, IBS, radiculopathy, sciatica. Patient states that she continues to progress well post operatively. Has been going to PT and feels like therapy is improving some of the minor discomforts she still has. States that PT has started massage. Notes that symptoms are improved compared to prior to surgery. Has continued wearing CAM and states she has not tried normal shoe gear. Has not gotten updated xrays yet.  Admits pain at 5/10 level and described as aching. She has taken medications as prescribed. Denies any constitutional symptoms. No other pedal complaints at this time.    Past Medical History:   Diagnosis Date   • ADHD (attention deficit hyperactivity disorder)     pt states as a child   • Bunion    • Callus    • DDD (degenerative disc disease), lumbar    • Difficulty walking    • Fallen arches    • Foot drop, left foot    • Hammer toe    • Herniation of intervertebral disc between L5 and S1    • History of back surgery     a lift and fusion   • Irritable bowel syndrome    • Lumbosacral radiculopathy 10/16/2020   • Plantar fasciitis    • Sciatic nerve pain, left      Past Surgical History:   Procedure Laterality Date   • ACHILLES TENDON SURGERY Right 09/21/2022    Procedure: Gastrocnemius vs  achilles tendon lengthening, Schmidt osteotomy, cotton osteotomy, Emil bunionectomy, possible tibial bone graft harvesting, and possible posterior tibial tendon advancement and repair- RIGHT;  Surgeon: Jerry Adrian DPM;  Location:  [facility];  Service: Podiatry;  Laterality: Right;   • APPENDECTOMY     • BUNIONECTOMY Right 2022    Procedure: Emil bunionectomy;  Surgeon: Jerry Adrian DPM;  Location:  [facility];  Service: Podiatry;  Laterality: Right;   • COLONOSCOPY  2016    Hemorrhoids otherwise normal exam repeat in 10 years   • ENDOSCOPY  2016    LA Grade A reflux esophagitis   • LUMBAR DISCECTOMY Left 10/16/2020    Procedure: LEFT L5-S1 MICRODISCECTOMY;  Surgeon: RUBIA Min MD;  Location:  [facility];  Service: Orthopedic Spine;  Laterality: Left;   • LUMBAR FUSION     • RECESSION GASTROCNEMIUS Right 2022    Procedure: Gastrocnemius;  Surgeon: Jerry Adrian DPM;  Location:  [facility];  Service: Podiatry;  Laterality: Right;     Family History   Problem Relation Age of Onset   • No Known Problems Mother    • COPD Father    • Heart disease Father         Late developing   • Diabetes Father         Late developing   • Heart failure Father    • Atrial fibrillation Father    • Hypertension Father    • Cancer Paternal Grandfather    • Colon cancer Neg Hx    • Colon polyps Neg Hx      Social History     Socioeconomic History   • Marital status:    Tobacco Use   • Smoking status: Former     Packs/day: 1.00     Years: 22.00     Pack years: 22.00     Types: Cigarettes, Electronic Cigarette     Quit date: 10/1/2020     Years since quittin.2     Passive exposure: Past   • Smokeless tobacco: Never   Vaping Use   • Vaping Use: Former   • Substances: Nicotine   • Devices: Disposable, Pre-filled or refillable cartridge   • Passive vaping exposure: Yes   Substance and Sexual Activity   • Alcohol use: Yes     Alcohol/week: 5.0 standard drinks     Types: 2 Shots of liquor, 3  Drinks containing 0.5 oz of alcohol per week     Comment: occ 3 to 4 glasses of wine a week   • Drug use: No   • Sexual activity: Yes     Partners: Male     Birth control/protection: I.U.D.     Allergies   Allergen Reactions   • Doxycycline Anaphylaxis     ALL CYCLINES    • Latex Rash     Current Outpatient Medications   Medication Sig Dispense Refill   • amitriptyline (ELAVIL) 100 MG tablet Take 100 mg by mouth Every Night.     • ARIPiprazole (ABILIFY) 5 MG tablet Take 5 mg by mouth Daily.     • cyclobenzaprine (FLEXERIL) 10 MG tablet Take 10 mg by mouth 3 (Three) Times a Day.     • docusate sodium (COLACE) 100 MG capsule Take 100 mg by mouth Every Night.     • Ergocalciferol (VITAMIN D2 PO) Take 50,000 Units by mouth 1 (One) Time Per Week.     • gabapentin (NEURONTIN) 600 MG tablet Take 600 mg by mouth 3 (Three) Times a Day.     • HYDROcodone-acetaminophen (NORCO) 5-325 MG per tablet Take 1 tablet by mouth Every 8 (Eight) Hours As Needed for Moderate Pain.     • lamoTRIgine (LaMICtal) 25 MG tablet Take 25 mg by mouth Daily.     • nystatin (MYCOSTATIN) 217650 UNIT/GM cream Apply 1 application topically to the appropriate area as directed 2 (Two) Times a Day.     • nystatin (MYCOSTATIN) 611922 UNIT/GM powder Apply 1 application topically to the appropriate area as directed 2 (Two) Times a Day.     • sertraline (ZOLOFT) 100 MG tablet Take 100 mg by mouth Daily.     • triamcinolone (KENALOG) 0.1 % cream Apply 1 application topically to the appropriate area as directed 2 (Two) Times a Day.     • buPROPion (WELLBUTRIN) 100 MG tablet Take 100 mg by mouth 2 (Two) Times a Day.     • Cariprazine HCl (VRAYLAR) 3 MG capsule capsule Take  by mouth Daily.     • Cholecalciferol (vitamin D3) 125 MCG (5000 UT) capsule capsule Take 5,000 Units by mouth Daily.     • DICLOFENAC PO Take 75 mg by mouth 2 (Two) Times a Day.     • oxyCODONE-acetaminophen (PERCOCET) 7.5-325 MG per tablet Take 1 tablet by mouth Every 6 (Six) Hours As  Needed (Pain). 28 tablet 0     No current facility-administered medications for this visit.     Review of Systems   Constitutional: Negative for chills and fever.   HENT: Negative for congestion.    Respiratory: Negative for shortness of breath.    Cardiovascular: Negative for chest pain and leg swelling.   Gastrointestinal: Negative for constipation, diarrhea, nausea and vomiting.   Musculoskeletal: Positive for back pain and gait problem.        Foot pain   Skin: Negative for wound.   Neurological: Positive for weakness and numbness.       OBJECTIVE     Vitals:    22 1332   BP: 142/82   Pulse: 118   SpO2: 97%       PHYSICAL EXAM  GEN:   Accompanied by none.    Foot/Ankle Exam:       General:   Appearance: appears stated age and healthy and obesity    Orientation: AAOx3    Affect: appropriate    Assistance: cane    Shoe Gear:  CAM boot    VASCULAR      Right Foot Vascularity   Dorsalis pedis:  2+  Posterior tibial:  2+  Skin Temperature: warm    Edema Gradin+ and non-pitting (focal to surgical sites)  CFT:  3  Pedal Hair Growth:  Present  Varicosities: none       Left Foot Vascularity   Dorsalis pedis:  2+  Posterior tibial:  2+  Skin Temperature: warm    Edema Grading:  None  CFT:  3  Pedal Hair Growth:  Present  Varicosities: none        NEUROLOGIC     Right Foot Neurologic   Normal sensation    Light touch sensation:  Normal  Vibratory sensation:  Normal  Hot/Cold sensation: normal    Protective Sensation using [place]-Zaina Monofilament:  10     Left Foot Neurologic   Light touch sensation:  Diminished  Vibratory sensation:  Diminished  Hot/cold sensation: diminished    Protective Sensation using [place]-Zaina Monofilament:  7     MUSCULOSKELETAL      Right Foot Musculoskeletal   Ecchymosis:  None  Tenderness comment:  Minimal to surgical sites  Arch:  Normal  Hallux valgus: No       Left Foot Musculoskeletal   Ecchymosis:  None  Tenderness: lateral malleolus and peroneal tendon    Tenderness  comment:  ATFL  Arch:  Pes planus     MUSCLE STRENGTH     Right Foot Muscle Strength   Foot dorsiflexion:  5  Foot plantar flexion:  4+  Foot inversion:  5  Foot eversion:  5     Left Foot Muscle Strength   Foot dorsiflexion:  4-  Foot plantar flexion:  5  Foot inversion:  4-  Foot eversion:  4-     RANGE OF MOTION      Left Foot Range of Motion   Foot and ankle ROM within normal limits    active eversion pain    active inversion pain       DERMATOLOGIC     Right Foot Dermatologic   Skin: skin intact    Nails: normal       Left Foot Dermatologic   Skin: skin intact    Nails: normal        Right Foot Additional Comments Incisions to the right lower extremity remain fully coapted.       RADIOLOGY/NUCLEAR:  No results found.    LABORATORY/CULTURE RESULTS:      PATHOLOGY RESULTS:       ASSESSMENT/PLAN     Diagnoses and all orders for this visit:    1. S/P foot surgery (Primary)      Comprehensive lower extremity examination and evaluation was performed.  Discussed findings and treatment plan including risks, benefits, and treatment options with patient in detail. Patient agreed with treatment plan.  Overall patient continues to improve post operatively.    Advised to get updated x-rays prior to leaving site today.  May return to normal shoe gear to tolerance over the next 2-3 weeks.   Continue PT - no restrictions during PT in regard to modalities and methods used.   An After Visit Summary was printed and given to the patient at discharge, including (if requested) any available informative/educational handouts regarding diagnosis, treatment, or medications. All questions were answered to patient/family satisfaction. Should symptoms fail to improve or worsen they agree to call or return to clinic or to go to the Emergency Department. Discussed the importance of following up with any needed screening tests/labs/specialist appointments and any requested follow-up recommended by me today. Importance of maintaining follow-up  discussed and patient accepts that missed appointments can delay diagnosis and potentially lead to worsening of conditions.  Return in about 2 months (around 2/28/2023) for Follow-up with Dr. Adrian., or sooner if acute issues arise.        This document has been electronically signed by Jerry Adrian DPM on December 30, 2022 14:01 CST

## 2022-12-29 ENCOUNTER — TELEPHONE (OUTPATIENT)
Dept: PODIATRY | Facility: CLINIC | Age: 37
End: 2022-12-29

## 2022-12-29 NOTE — TELEPHONE ENCOUNTER
Called patient regarding appt on 12/30/2022. Left message for patient to return call if any questions or concerns arise.

## 2022-12-30 ENCOUNTER — HOSPITAL ENCOUNTER (OUTPATIENT)
Dept: GENERAL RADIOLOGY | Facility: HOSPITAL | Age: 37
Discharge: HOME | End: 2022-12-30
Admitting: PODIATRIST

## 2022-12-30 ENCOUNTER — OFFICE VISIT (OUTPATIENT)
Dept: PODIATRY | Facility: CLINIC | Age: 37
End: 2022-12-30

## 2022-12-30 VITALS
HEART RATE: 118 BPM | OXYGEN SATURATION: 97 % | SYSTOLIC BLOOD PRESSURE: 142 MMHG | DIASTOLIC BLOOD PRESSURE: 82 MMHG | BODY MASS INDEX: 42.82 KG/M2 | HEIGHT: 65 IN | WEIGHT: 257 LBS

## 2022-12-30 DIAGNOSIS — Z98.890 S/P FOOT SURGERY: ICD-10-CM

## 2022-12-30 DIAGNOSIS — Z98.890 S/P FOOT SURGERY: Primary | ICD-10-CM

## 2022-12-30 PROCEDURE — 73630 X-RAY EXAM OF FOOT: CPT

## 2022-12-30 PROCEDURE — 99212 OFFICE O/P EST SF 10 MIN: CPT | Performed by: PODIATRIST

## 2022-12-30 RX ORDER — LAMOTRIGINE 25 MG/1
25 TABLET ORAL DAILY
COMMUNITY

## 2022-12-30 RX ORDER — ARIPIPRAZOLE 5 MG/1
5 TABLET ORAL DAILY
COMMUNITY

## 2023-02-22 NOTE — PROGRESS NOTES
[facility] - PODIATRY    Today's Date: 02/28/23    Patient Name: Nicole AGUILAR  MRN: [id number]  CSN: [id number]  PCP: Anthony Lawler DO  Referring Provider: No ref. provider found    SUBJECTIVE     Chief Complaint   Patient presents with   • Follow-up     Anthony Lawler DO-10/14/2022-2 month fu with Kaylah DICK 09/21/2022- pt states foot doing good, improving every day. Has developed a callus on right heel- pt pain 2/10  at worst     HPI: Nicole AGUILAR, a 37 y.o.female, comes to clinic as a(n) established patient 6 months s/p right flatfoot reconstruction. Patient has h/o ADHD, foot drop, herniation of intervertebral disc, IBS, radiculopathy, sciatica. Relates that she has continued with PT and continues to improve each week. She does have some residual swelling and weakness but again is improving. Notes continued issues with her LLE dropfoot. She has returned to regular shoe gear.  Admits pain at 2/10 level and described as aching and dull. She has taken medications as prescribed. Denies any constitutional symptoms. No other pedal complaints at this time.    Past Medical History:   Diagnosis Date   • ADHD (attention deficit hyperactivity disorder)     pt states as a child   • Bunion    • Callus    • DDD (degenerative disc disease), lumbar    • Difficulty walking    • Fallen arches    • Foot drop, left foot    • Hammer toe    • Herniation of intervertebral disc between L5 and S1    • History of back surgery     a lift and fusion   • Irritable bowel syndrome    • Lumbosacral radiculopathy 10/16/2020   • Plantar fasciitis    • Sciatic nerve pain, left      Past Surgical History:   Procedure Laterality Date   • ACHILLES TENDON SURGERY Right 09/21/2022    Procedure: Gastrocnemius vs achilles tendon lengthening, Schmidt osteotomy, cotton osteotomy, Emil bunionectomy, possible tibial bone graft harvesting, and possible posterior tibial tendon advancement and repair- RIGHT;  Surgeon: Kaylah  Jerry TIWARI DPM;  Location:  [facility];  Service: Podiatry;  Laterality: Right;   • APPENDECTOMY     • BUNIONECTOMY Right 2022    Procedure: Emil bunionectomy;  Surgeon: Jerry Adrian DPM;  Location:  [facility];  Service: Podiatry;  Laterality: Right;   • COLONOSCOPY  2016    Hemorrhoids otherwise normal exam repeat in 10 years   • ENDOSCOPY  2016    LA Grade A reflux esophagitis   • LUMBAR DISCECTOMY Left 10/16/2020    Procedure: LEFT L5-S1 MICRODISCECTOMY;  Surgeon: RUBIA Min MD;  Location:  [facility];  Service: Orthopedic Spine;  Laterality: Left;   • LUMBAR FUSION     • RECESSION GASTROCNEMIUS Right 2022    Procedure: Gastrocnemius;  Surgeon: Jerry Adrian DPM;  Location:  [facility];  Service: Podiatry;  Laterality: Right;     Family History   Problem Relation Age of Onset   • No Known Problems Mother    • COPD Father    • Heart disease Father         Late developing   • Diabetes Father         Late developing   • Heart failure Father    • Atrial fibrillation Father    • Hypertension Father    • Cancer Paternal Grandfather    • Colon cancer Neg Hx    • Colon polyps Neg Hx      Social History     Socioeconomic History   • Marital status:    Tobacco Use   • Smoking status: Former     Packs/day: 1.00     Years: 22.00     Pack years: 22.00     Types: Cigarettes, Electronic Cigarette     Quit date: 10/1/2020     Years since quittin.4     Passive exposure: Past   • Smokeless tobacco: Never   Vaping Use   • Vaping Use: Every day   • Substances: Nicotine   • Devices: Disposable, Pre-filled or refillable cartridge   • Passive vaping exposure: Yes   Substance and Sexual Activity   • Alcohol use: Yes     Alcohol/week: 7.0 standard drinks     Types: 2 Glasses of wine, 2 Shots of liquor, 3 Drinks containing 0.5 oz of alcohol per week     Comment: occ 3 to 4 glasses of wine a week   • Drug use: No   • Sexual activity: Yes     Partners: Male     Birth control/protection:  I.U.D.     Allergies   Allergen Reactions   • Doxycycline Anaphylaxis     ALL CYCLINES    • Latex Rash     Current Outpatient Medications   Medication Sig Dispense Refill   • amitriptyline (ELAVIL) 100 MG tablet Take 1 tablet by mouth Every Night.     • ARIPiprazole (ABILIFY) 5 MG tablet Take 1 tablet by mouth Daily.     • buPROPion (WELLBUTRIN) 100 MG tablet Take 1 tablet by mouth 2 (Two) Times a Day.     • cyclobenzaprine (FLEXERIL) 10 MG tablet Take 1 tablet by mouth 3 (Three) Times a Day.     • diclofenac-miSOPROStol (ARTHROTEC 75) 75-0.2 MG EC tablet Take 1 tablet by mouth 2 (Two) Times a Day.     • docusate sodium (COLACE) 100 MG capsule Take 1 capsule by mouth Every Night.     • Ergocalciferol (VITAMIN D2 PO) Take 50,000 Units by mouth 1 (One) Time Per Week.     • gabapentin (NEURONTIN) 600 MG tablet Take 1 tablet by mouth 3 (Three) Times a Day.     • HYDROcodone-acetaminophen (NORCO) 5-325 MG per tablet Take 1 tablet by mouth Every 8 (Eight) Hours As Needed for Moderate Pain.     • lamoTRIgine (LaMICtal) 25 MG tablet Take 1 tablet by mouth Daily.     • nystatin (MYCOSTATIN) 132765 UNIT/GM cream Apply 1 application topically to the appropriate area as directed 2 (Two) Times a Day.     • nystatin (MYCOSTATIN) 395878 UNIT/GM powder Apply 1 application topically to the appropriate area as directed 2 (Two) Times a Day.     • sertraline (ZOLOFT) 100 MG tablet Take 1 tablet by mouth Daily.     • triamcinolone (KENALOG) 0.1 % cream Apply 1 application topically to the appropriate area as directed 2 (Two) Times a Day.     • Cariprazine HCl (VRAYLAR) 3 MG capsule capsule Take  by mouth Daily.     • Cholecalciferol (vitamin D3) 125 MCG (5000 UT) capsule capsule Take 5,000 Units by mouth Daily.     • DICLOFENAC PO Take 75 mg by mouth 2 (Two) Times a Day.     • oxyCODONE-acetaminophen (PERCOCET) 7.5-325 MG per tablet Take 1 tablet by mouth Every 6 (Six) Hours As Needed (Pain). 28 tablet 0     No current  facility-administered medications for this visit.     Review of Systems   Constitutional: Negative for chills and fever.   HENT: Negative for congestion.    Respiratory: Negative for shortness of breath.    Cardiovascular: Negative for chest pain and leg swelling.   Gastrointestinal: Negative for constipation, diarrhea, nausea and vomiting.   Musculoskeletal: Positive for back pain and gait problem.        Foot pain   Skin: Negative for wound.   Neurological: Positive for weakness and numbness.       OBJECTIVE     Vitals:    02/28/23 1335   BP: 132/78   Pulse: 110   SpO2: 99%       PHYSICAL EXAM  GEN:   Accompanied by none.    Foot/Ankle Exam:       General:   Appearance: appears stated age and healthy and obesity    Orientation: AAOx3    Affect: appropriate    Assistance: cane    Shoe Gear:  Casual shoes (left AFO)    VASCULAR      Right Foot Vascularity   Dorsalis pedis:  2+  Posterior tibial:  2+  Skin Temperature: warm    Edema Grading:  Trace (focal to surgical sites)  CFT:  3  Pedal Hair Growth:  Present  Varicosities: none       Left Foot Vascularity   Dorsalis pedis:  2+  Posterior tibial:  2+  Skin Temperature: warm    Edema Grading:  None  CFT:  3  Pedal Hair Growth:  Present  Varicosities: none        NEUROLOGIC     Right Foot Neurologic   Normal sensation    Light touch sensation:  Normal  Vibratory sensation:  Normal  Hot/Cold sensation: normal    Protective Sensation using Allen-Zaina Monofilament:  10     Left Foot Neurologic   Light touch sensation:  Diminished  Vibratory sensation:  Diminished  Hot/cold sensation: diminished    Protective Sensation using Allen-Zaina Monofilament:  7     MUSCULOSKELETAL      Right Foot Musculoskeletal   Ecchymosis:  None  Tenderness: none    Arch:  Normal  Hallux valgus: No       Left Foot Musculoskeletal   Ecchymosis:  None  Tenderness: lateral malleolus and peroneal tendon    Tenderness comment:  ATFL  Arch:  Pes planus     MUSCLE STRENGTH     Right Foot  Muscle Strength   Foot dorsiflexion:  5  Foot plantar flexion:  4+  Foot inversion:  5  Foot eversion:  5     Left Foot Muscle Strength   Foot dorsiflexion:  4-  Foot plantar flexion:  4+  Foot inversion:  4-  Foot eversion:  3     RANGE OF MOTION      Left Foot Range of Motion   Foot and ankle ROM within normal limits    active eversion pain    active inversion pain       DERMATOLOGIC     Right Foot Dermatologic   Skin: skin intact    Nails: normal       Left Foot Dermatologic   Skin: skin intact    Nails: normal        Right Foot Additional Comments Incisions to the right lower extremity remain fully coapted.       RADIOLOGY/NUCLEAR:  No results found.    LABORATORY/CULTURE RESULTS:      PATHOLOGY RESULTS:       ASSESSMENT/PLAN     Diagnoses and all orders for this visit:    1. S/P foot surgery (Primary)    2. Foot drop, left    3. Ankle instability, left    4. Herniation of intervertebral disc between L5 and S1      Comprehensive lower extremity examination and evaluation was performed.  Discussed findings and treatment plan including risks, benefits, and treatment options with patient in detail. Patient agreed with treatment plan.  Overall patient continues to improve post operatively.    May continue PT PRN.  Return to regular shoes and activities to tolerance.   Discussed potential options for LLE including AT transfer or hindfoot fusion.  This will be an ongoing discussion with no immediate plans for intervention.   An After Visit Summary was printed and given to the patient at discharge, including (if requested) any available informative/educational handouts regarding diagnosis, treatment, or medications. All questions were answered to patient/family satisfaction. Should symptoms fail to improve or worsen they agree to call or return to clinic or to go to the Emergency Department. Discussed the importance of following up with any needed screening tests/labs/specialist appointments and any requested follow-up  recommended by me today. Importance of maintaining follow-up discussed and patient accepts that missed appointments can delay diagnosis and potentially lead to worsening of conditions.  Return in about 6 months (around 8/28/2023) for Recheck, Follow-up with Dr. Adrian., or sooner if acute issues arise.        This document has been electronically signed by Jerry Adrian DPM on February 28, 2023 14:13 CST

## 2023-02-27 ENCOUNTER — TELEPHONE (OUTPATIENT)
Dept: PODIATRY | Facility: CLINIC | Age: 38
End: 2023-02-27
Payer: MEDICARE

## 2023-02-28 ENCOUNTER — OFFICE VISIT (OUTPATIENT)
Dept: PODIATRY | Facility: CLINIC | Age: 38
End: 2023-02-28
Payer: MEDICARE

## 2023-02-28 VITALS
HEIGHT: 65 IN | HEART RATE: 110 BPM | OXYGEN SATURATION: 99 % | DIASTOLIC BLOOD PRESSURE: 78 MMHG | BODY MASS INDEX: 42.49 KG/M2 | WEIGHT: 255 LBS | SYSTOLIC BLOOD PRESSURE: 132 MMHG

## 2023-02-28 DIAGNOSIS — M51.27 HERNIATION OF INTERVERTEBRAL DISC BETWEEN L5 AND S1: ICD-10-CM

## 2023-02-28 DIAGNOSIS — M25.372 ANKLE INSTABILITY, LEFT: ICD-10-CM

## 2023-02-28 DIAGNOSIS — Z98.890 S/P FOOT SURGERY: Primary | ICD-10-CM

## 2023-02-28 DIAGNOSIS — M21.372 FOOT DROP, LEFT: ICD-10-CM

## 2023-02-28 PROCEDURE — 99213 OFFICE O/P EST LOW 20 MIN: CPT | Performed by: PODIATRIST

## 2023-02-28 RX ORDER — DICLOFENAC SODIUM AND MISOPROSTOL 75; 200 MG/1; UG/1
1 TABLET, DELAYED RELEASE ORAL 2 TIMES DAILY
COMMUNITY

## 2023-03-17 ENCOUNTER — TELEPHONE (OUTPATIENT)
Dept: GASTROENTEROLOGY | Facility: CLINIC | Age: 38
End: 2023-03-17
Payer: MEDICARE

## 2023-03-17 NOTE — TELEPHONE ENCOUNTER
Patient called stating it will be several months before she is able to schedule colonoscopy she states she's received several letters you may want to put her in recall for around September. Thanks

## 2023-04-06 ENCOUNTER — TELEPHONE (OUTPATIENT)
Dept: PODIATRY | Facility: CLINIC | Age: 38
End: 2023-04-06
Payer: MEDICARE

## 2023-04-06 NOTE — TELEPHONE ENCOUNTER
Patient called wanting to know if a letter and note could be sent to [facility] for a replacement brace for her drop foot. I have sent dr mesa and message and will contact the patient when I have a reply.

## 2023-05-23 ENCOUNTER — OFFICE VISIT (OUTPATIENT)
Dept: OBSTETRICS AND GYNECOLOGY | Facility: CLINIC | Age: 38
End: 2023-05-23
Payer: MEDICARE

## 2023-05-23 ENCOUNTER — TRANSCRIBE ORDERS (OUTPATIENT)
Dept: ADMINISTRATIVE | Facility: HOSPITAL | Age: 38
End: 2023-05-23
Payer: MEDICARE

## 2023-05-23 VITALS
DIASTOLIC BLOOD PRESSURE: 84 MMHG | WEIGHT: 247 LBS | HEIGHT: 65 IN | BODY MASS INDEX: 41.15 KG/M2 | SYSTOLIC BLOOD PRESSURE: 130 MMHG

## 2023-05-23 DIAGNOSIS — Z12.31 ENCOUNTER FOR SCREENING MAMMOGRAM FOR MALIGNANT NEOPLASM OF BREAST: Primary | ICD-10-CM

## 2023-05-23 DIAGNOSIS — Z30.09 ENCOUNTER FOR OTHER GENERAL COUNSELING OR ADVICE ON CONTRACEPTION: Primary | ICD-10-CM

## 2023-05-23 RX ORDER — ERGOCALCIFEROL 1.25 MG/1
CAPSULE ORAL
COMMUNITY
Start: 2023-03-28

## 2023-05-23 RX ORDER — LIRAGLUTIDE 6 MG/ML
INJECTION SUBCUTANEOUS
COMMUNITY
Start: 2023-04-11

## 2023-05-23 RX ORDER — BUPROPION HYDROCHLORIDE 150 MG/1
TABLET ORAL
COMMUNITY
Start: 2023-05-08

## 2023-05-23 RX ORDER — LISDEXAMFETAMINE DIMESYLATE 60 MG/1
CAPSULE ORAL
COMMUNITY

## 2023-05-23 NOTE — PROGRESS NOTES
"Subjective   Chief Complaint   Patient presents with    Contraception     Pt here today as new pt to discuss changing IUD. Pt voices that her IUD will be due to change out in September. Pt voices having low libido. Pt voices no other concerns.      Nicole AGUILAR is a 38 y.o. year old No obstetric history on file..  No LMP recorded. Patient has had an implant.  She presents to be seen because she would like her Mirena replaced with a new device.  She is due to have it replaced in September, based on previous recommendations to have Mirena replaced every 5 years.  Patient is not having menses at all, and really likes not having a period.    The following portions of the patient's history were reviewed and updated as appropriate:current medications and allergies    Social History    Tobacco Use      Smoking status: Some Days        Packs/day: 1.00        Years: 22.00        Pack years: 22        Types: Cigarettes, Electronic Cigarette        Last attempt to quit: 10/1/2020        Years since quittin.6        Passive exposure: Past      Smokeless tobacco: Never    Review of Systems   Constitutional:  Negative for activity change and unexpected weight change.   Respiratory:  Negative for shortness of breath.    Cardiovascular:  Negative for chest pain.   Genitourinary:  Positive for vaginal bleeding (Very occasional postcoital spotting). Negative for menstrual problem and pelvic pain.       Objective   /84   Ht 163.8 cm (64.5\")   Wt 112 kg (247 lb)   BMI 41.74 kg/m²     Physical Exam  Vitals and nursing note reviewed.   Constitutional:       General: She is not in acute distress.     Appearance: She is well-developed.   HENT:      Head: Normocephalic and atraumatic.   Neck:      Thyroid: No thyromegaly.   Pulmonary:      Effort: Pulmonary effort is normal.   Musculoskeletal:         General: Normal range of motion.      Cervical back: Normal range of motion.   Skin:     General: Skin is warm and dry. "   Neurological:      Mental Status: She is alert and oriented to person, place, and time.   Psychiatric:         Behavior: Behavior normal.         Judgment: Judgment normal.       Lab Review   No data reviewed    Imaging   No data reviewed     Assessment & Plan    Diagnoses and all orders for this visit:    1. Encounter for other general counseling or advice on contraception (Primary): Patient here for consultation about removal and replacement of her Mirena IUD.  The patient likes her Mirena and is very satisfied with the amenorrhea it has caused.  Patient was advised at the time of placement that the device would need to be replaced in 5 years, and that will be this coming September.    The patient has been advised that since placement, the IUD has been approved for 8 years of use, so her current device does not have to be changed.  She is having occasional postcoital spotting but not any menses.  Nicole is also having discomfort during intercourse which just occurs occasionally, in certain positions.  She was offered the opportunity to remove and replace her IUD now, so that she might could avoid the rare times that there is pain or bleeding associated with sex.  The patient recalls that placement of her IUD was very difficult and she does not want to do that again unless it is necessary; she finds these problems to be no more than an occasional inconvenience.  For now, the patient would like to just leave her current IUD in place; she will turn to the office if her menstrual cycles return or if these problems become more bothersome.  The patient will schedule a routine annual exam as she is leaving the office today; she plans to continue routine GYN care here now that she is establish care.       This note was electronically signed.    Faith Herr MD  May 23, 2023  13:21 CDT    Total time spent today with Nicole  was 30-44 minutes (level 3).  Greater than 50% of the time was spent coordinating care,  answering her questions and counseling regarding pathophysiology of her presenting problem along with plans for any diagnositc work-up and treatment.

## 2023-05-26 ENCOUNTER — PATIENT ROUNDING (BHMG ONLY) (OUTPATIENT)
Dept: OBSTETRICS AND GYNECOLOGY | Facility: CLINIC | Age: 38
End: 2023-05-26
Payer: MEDICARE

## 2023-05-26 NOTE — PROGRESS NOTES
"May 26, 2023    Hello, may I speak with Nicole AGUILAR?    My name is Alessandra      I am  with [facility]  [address], [address]  [place] [address] [phone].    Before we get started may I verify your date of birth? 1985    I am calling to officially welcome you to our practice and ask about your recent visit. Is this a good time to talk? yes    Tell me about your visit with us. What things went well?  \"Everything went great, it was a great experience!\"       We're always looking for ways to make our patients' experiences even better. Do you have recommendations on ways we may improve?  no    Overall were you satisfied with your first visit to our practice? yes       I appreciate you taking the time to speak with me today. Is there anything else I can do for you? no      Thank you, and have a great day.      "

## 2023-06-02 ENCOUNTER — HOSPITAL ENCOUNTER (OUTPATIENT)
Dept: MAMMOGRAPHY | Facility: HOSPITAL | Age: 38
Discharge: HOME | End: 2023-06-02
Admitting: INTERNAL MEDICINE

## 2023-06-02 DIAGNOSIS — Z12.31 ENCOUNTER FOR SCREENING MAMMOGRAM FOR MALIGNANT NEOPLASM OF BREAST: ICD-10-CM

## 2023-06-02 PROCEDURE — 77063 BREAST TOMOSYNTHESIS BI: CPT

## 2023-06-02 PROCEDURE — 77067 SCR MAMMO BI INCL CAD: CPT

## 2023-07-20 PROCEDURE — 99283 EMERGENCY DEPT VISIT LOW MDM: CPT

## 2023-07-20 RX ORDER — SODIUM CHLORIDE 0.9 % (FLUSH) 0.9 %
10 SYRINGE (ML) INJECTION AS NEEDED
Status: DISCONTINUED | OUTPATIENT
Start: 2023-07-20 | End: 2023-07-21 | Stop reason: HOSPADM

## 2023-07-21 ENCOUNTER — HOSPITAL ENCOUNTER (EMERGENCY)
Facility: HOSPITAL | Age: 38
Discharge: HOME | End: 2023-07-21
Attending: STUDENT IN AN ORGANIZED HEALTH CARE EDUCATION/TRAINING PROGRAM | Admitting: STUDENT IN AN ORGANIZED HEALTH CARE EDUCATION/TRAINING PROGRAM
Payer: MEDICARE

## 2023-07-21 ENCOUNTER — APPOINTMENT (OUTPATIENT)
Dept: CT IMAGING | Facility: HOSPITAL | Age: 38
End: 2023-07-21
Payer: MEDICARE

## 2023-07-21 VITALS
HEART RATE: 99 BPM | BODY MASS INDEX: 39.27 KG/M2 | OXYGEN SATURATION: 100 % | SYSTOLIC BLOOD PRESSURE: 139 MMHG | RESPIRATION RATE: 22 BRPM | WEIGHT: 230 LBS | DIASTOLIC BLOOD PRESSURE: 79 MMHG | HEIGHT: 64 IN | TEMPERATURE: 98 F

## 2023-07-21 DIAGNOSIS — R10.84 ACUTE GENERALIZED ABDOMINAL PAIN: Primary | ICD-10-CM

## 2023-07-21 DIAGNOSIS — B96.89 BACTERIAL VAGINOSIS: ICD-10-CM

## 2023-07-21 DIAGNOSIS — R11.2 NAUSEA AND VOMITING, UNSPECIFIED VOMITING TYPE: ICD-10-CM

## 2023-07-21 DIAGNOSIS — N76.0 BACTERIAL VAGINOSIS: ICD-10-CM

## 2023-07-21 LAB
ALBUMIN SERPL-MCNC: 4.7 G/DL (ref 3.5–5.2)
ALBUMIN/GLOB SERPL: 2 G/DL
ALP SERPL-CCNC: 79 U/L (ref 39–117)
ALT SERPL W P-5'-P-CCNC: 12 U/L (ref 1–33)
ANION GAP SERPL CALCULATED.3IONS-SCNC: 14 MMOL/L (ref 5–15)
AST SERPL-CCNC: 14 U/L (ref 1–32)
BACTERIA UR QL AUTO: ABNORMAL /HPF
BASOPHILS # BLD AUTO: 0.04 10*3/MM3 (ref 0–0.2)
BASOPHILS NFR BLD AUTO: 0.4 % (ref 0–1.5)
BILIRUB SERPL-MCNC: 0.6 MG/DL (ref 0–1.2)
BILIRUB UR QL STRIP: NEGATIVE
BUN SERPL-MCNC: 8 MG/DL (ref 6–20)
BUN/CREAT SERPL: 15.1 (ref 7–25)
CALCIUM SPEC-SCNC: 9.7 MG/DL (ref 8.6–10.5)
CHLORIDE SERPL-SCNC: 105 MMOL/L (ref 98–107)
CLARITY UR: ABNORMAL
CLUE CELLS SPEC QL WET PREP: ABNORMAL
CO2 SERPL-SCNC: 22 MMOL/L (ref 22–29)
COLOR UR: ABNORMAL
CREAT SERPL-MCNC: 0.53 MG/DL (ref 0.57–1)
D-LACTATE SERPL-SCNC: 1.3 MMOL/L (ref 0.5–2)
DEPRECATED RDW RBC AUTO: 44.9 FL (ref 37–54)
EGFRCR SERPLBLD CKD-EPI 2021: 120.1 ML/MIN/1.73
EOSINOPHIL # BLD AUTO: 0.02 10*3/MM3 (ref 0–0.4)
EOSINOPHIL NFR BLD AUTO: 0.2 % (ref 0.3–6.2)
ERYTHROCYTE [DISTWIDTH] IN BLOOD BY AUTOMATED COUNT: 14.2 % (ref 12.3–15.4)
GLOBULIN UR ELPH-MCNC: 2.3 GM/DL
GLUCOSE SERPL-MCNC: 117 MG/DL (ref 65–99)
GLUCOSE UR STRIP-MCNC: NEGATIVE MG/DL
HCG SERPL QL: NEGATIVE
HCT VFR BLD AUTO: 42.4 % (ref 34–46.6)
HGB BLD-MCNC: 13.9 G/DL (ref 12–15.9)
HGB UR QL STRIP.AUTO: NEGATIVE
HYALINE CASTS UR QL AUTO: ABNORMAL /LPF
HYDATID CYST SPEC WET PREP: ABNORMAL
IMM GRANULOCYTES # BLD AUTO: 0.03 10*3/MM3 (ref 0–0.05)
IMM GRANULOCYTES NFR BLD AUTO: 0.3 % (ref 0–0.5)
KETONES UR QL STRIP: ABNORMAL
LEUKOCYTE ESTERASE UR QL STRIP.AUTO: ABNORMAL
LIPASE SERPL-CCNC: 26 U/L (ref 13–60)
LYMPHOCYTES # BLD AUTO: 1.73 10*3/MM3 (ref 0.7–3.1)
LYMPHOCYTES NFR BLD AUTO: 18 % (ref 19.6–45.3)
MCH RBC QN AUTO: 28.7 PG (ref 26.6–33)
MCHC RBC AUTO-ENTMCNC: 32.8 G/DL (ref 31.5–35.7)
MCV RBC AUTO: 87.4 FL (ref 79–97)
MONOCYTES # BLD AUTO: 0.6 10*3/MM3 (ref 0.1–0.9)
MONOCYTES NFR BLD AUTO: 6.2 % (ref 5–12)
MUCOUS THREADS URNS QL MICRO: ABNORMAL /HPF
NEUTROPHILS NFR BLD AUTO: 7.19 10*3/MM3 (ref 1.7–7)
NEUTROPHILS NFR BLD AUTO: 74.9 % (ref 42.7–76)
NITRITE UR QL STRIP: NEGATIVE
NRBC BLD AUTO-RTO: 0 /100 WBC (ref 0–0.2)
PH UR STRIP.AUTO: 6 [PH] (ref 5–8)
PLATELET # BLD AUTO: 356 10*3/MM3 (ref 140–450)
PMV BLD AUTO: 9.7 FL (ref 6–12)
POTASSIUM SERPL-SCNC: 3.1 MMOL/L (ref 3.5–5.2)
PROT SERPL-MCNC: 7 G/DL (ref 6–8.5)
PROT UR QL STRIP: ABNORMAL
RBC # BLD AUTO: 4.85 10*6/MM3 (ref 3.77–5.28)
RBC # UR STRIP: ABNORMAL /HPF
REF LAB TEST METHOD: ABNORMAL
SODIUM SERPL-SCNC: 141 MMOL/L (ref 136–145)
SP GR UR STRIP: 1.02 (ref 1–1.03)
SQUAMOUS #/AREA URNS HPF: ABNORMAL /HPF
T VAGINALIS SPEC QL WET PREP: ABNORMAL
UROBILINOGEN UR QL STRIP: ABNORMAL
WBC # UR STRIP: ABNORMAL /HPF
WBC NRBC COR # BLD: 9.61 10*3/MM3 (ref 3.4–10.8)
WBC SPEC QL WET PREP: ABNORMAL
YEAST GENITAL QL WET PREP: ABNORMAL

## 2023-07-21 PROCEDURE — 36415 COLL VENOUS BLD VENIPUNCTURE: CPT

## 2023-07-21 PROCEDURE — 87210 SMEAR WET MOUNT SALINE/INK: CPT | Performed by: STUDENT IN AN ORGANIZED HEALTH CARE EDUCATION/TRAINING PROGRAM

## 2023-07-21 PROCEDURE — 74177 CT ABD & PELVIS W/CONTRAST: CPT

## 2023-07-21 PROCEDURE — 85025 COMPLETE CBC W/AUTO DIFF WBC: CPT | Performed by: STUDENT IN AN ORGANIZED HEALTH CARE EDUCATION/TRAINING PROGRAM

## 2023-07-21 PROCEDURE — 25510000001 IOPAMIDOL 61 % SOLUTION: Performed by: STUDENT IN AN ORGANIZED HEALTH CARE EDUCATION/TRAINING PROGRAM

## 2023-07-21 PROCEDURE — 63710000001 ONDANSETRON ODT 4 MG TABLET DISPERSIBLE: Performed by: STUDENT IN AN ORGANIZED HEALTH CARE EDUCATION/TRAINING PROGRAM

## 2023-07-21 PROCEDURE — 84703 CHORIONIC GONADOTROPIN ASSAY: CPT | Performed by: STUDENT IN AN ORGANIZED HEALTH CARE EDUCATION/TRAINING PROGRAM

## 2023-07-21 PROCEDURE — 83605 ASSAY OF LACTIC ACID: CPT | Performed by: STUDENT IN AN ORGANIZED HEALTH CARE EDUCATION/TRAINING PROGRAM

## 2023-07-21 PROCEDURE — 83690 ASSAY OF LIPASE: CPT | Performed by: STUDENT IN AN ORGANIZED HEALTH CARE EDUCATION/TRAINING PROGRAM

## 2023-07-21 PROCEDURE — 96374 THER/PROPH/DIAG INJ IV PUSH: CPT

## 2023-07-21 PROCEDURE — 25010000002 DROPERIDOL PER 5 MG: Performed by: STUDENT IN AN ORGANIZED HEALTH CARE EDUCATION/TRAINING PROGRAM

## 2023-07-21 PROCEDURE — 80053 COMPREHEN METABOLIC PANEL: CPT | Performed by: STUDENT IN AN ORGANIZED HEALTH CARE EDUCATION/TRAINING PROGRAM

## 2023-07-21 PROCEDURE — 81001 URINALYSIS AUTO W/SCOPE: CPT | Performed by: STUDENT IN AN ORGANIZED HEALTH CARE EDUCATION/TRAINING PROGRAM

## 2023-07-21 RX ORDER — DROPERIDOL 2.5 MG/ML
2.5 INJECTION, SOLUTION INTRAMUSCULAR; INTRAVENOUS ONCE
Status: COMPLETED | OUTPATIENT
Start: 2023-07-21 | End: 2023-07-21

## 2023-07-21 RX ORDER — ONDANSETRON 4 MG/1
4 TABLET, ORALLY DISINTEGRATING ORAL ONCE
Status: COMPLETED | OUTPATIENT
Start: 2023-07-21 | End: 2023-07-21

## 2023-07-21 RX ORDER — ONDANSETRON 4 MG/1
4 TABLET, ORALLY DISINTEGRATING ORAL EVERY 6 HOURS PRN
Qty: 28 TABLET | Refills: 0 | Status: SHIPPED | OUTPATIENT
Start: 2023-07-21 | End: 2023-07-21 | Stop reason: SDUPTHER

## 2023-07-21 RX ORDER — DICYCLOMINE HCL 20 MG
20 TABLET ORAL EVERY 6 HOURS PRN
Qty: 28 TABLET | Refills: 0 | Status: SHIPPED | OUTPATIENT
Start: 2023-07-21 | End: 2023-07-28

## 2023-07-21 RX ORDER — METRONIDAZOLE 500 MG/1
500 TABLET ORAL 2 TIMES DAILY
Qty: 14 TABLET | Refills: 0 | Status: SHIPPED | OUTPATIENT
Start: 2023-07-21 | End: 2023-07-21 | Stop reason: SDUPTHER

## 2023-07-21 RX ORDER — DICYCLOMINE HCL 20 MG
20 TABLET ORAL EVERY 6 HOURS PRN
Qty: 28 TABLET | Refills: 0 | Status: SHIPPED | OUTPATIENT
Start: 2023-07-21 | End: 2023-07-21 | Stop reason: SDUPTHER

## 2023-07-21 RX ORDER — ONDANSETRON 4 MG/1
4 TABLET, ORALLY DISINTEGRATING ORAL EVERY 6 HOURS PRN
Qty: 28 TABLET | Refills: 0 | Status: SHIPPED | OUTPATIENT
Start: 2023-07-21 | End: 2023-07-28

## 2023-07-21 RX ORDER — METRONIDAZOLE 500 MG/1
500 TABLET ORAL 2 TIMES DAILY
Qty: 14 TABLET | Refills: 0 | Status: SHIPPED | OUTPATIENT
Start: 2023-07-21 | End: 2023-07-28

## 2023-07-21 RX ORDER — DICYCLOMINE HCL 20 MG
20 TABLET ORAL ONCE
Status: COMPLETED | OUTPATIENT
Start: 2023-07-21 | End: 2023-07-21

## 2023-07-21 RX ADMIN — DICYCLOMINE HYDROCHLORIDE 20 MG: 20 TABLET ORAL at 05:51

## 2023-07-21 RX ADMIN — IOPAMIDOL 100 ML: 612 INJECTION, SOLUTION INTRAVENOUS at 02:37

## 2023-07-21 RX ADMIN — DROPERIDOL 2.5 MG: 2.5 INJECTION, SOLUTION INTRAMUSCULAR; INTRAVENOUS at 02:21

## 2023-07-21 RX ADMIN — ONDANSETRON 4 MG: 4 TABLET, ORALLY DISINTEGRATING ORAL at 01:17

## 2023-07-21 RX ADMIN — SODIUM CHLORIDE, POTASSIUM CHLORIDE, SODIUM LACTATE AND CALCIUM CHLORIDE 1000 ML: 600; 310; 30; 20 INJECTION, SOLUTION INTRAVENOUS at 02:21

## 2023-07-21 NOTE — Clinical Note
[facility] EMERGENCY DEPARTMENT  [address] [place] [address] AVE  [facility] [address]  Phone: [phone]    Nicole AGUILAR was seen and treated in our emergency department on 7/20/2023.  She may return to work on 07/23/2023.         Thank you for choosing [facility].    Dariel Gonzalez MD

## 2023-07-21 NOTE — Clinical Note
[facility] EMERGENCY DEPARTMENT  [address] [place] [address] AVE  [facility] [address]  Phone: [phone]    Nicole AGUILAR was seen and treated in our emergency department on 7/20/2023.  She may return to work on 07/23/2023.         Thank you for choosing [facility].    Dariel Gonzalez MD

## 2023-07-21 NOTE — ED PROVIDER NOTES
"EMERGENCY DEPARTMENT ATTENDING NOTE    Patient Name: Nicole AGUILAR    Chief Complaint   Patient presents with    Abdominal Pain       PATIENT PRESENTATION:  Nicole AGUILAR is a very pleasant 38 y.o. female with a history of irritable bowel syndrome as well as prior appendectomy presenting to the emergency department due to abdominal pain with nausea and vomiting.    Patient states that she has been having some abdominal pain with some associate nausea vomiting for the last 2 to 3 days.  States she is unable to take her medicines in that setting.  When asked where her abdominal pain is states it is somewhat lower she points to her suprapubic area.  Has not noticed any dysuria.  No fevers or chills.  Prior appendectomy in childhood.  No blood in her stool or change in her stool or constipation reported also no diarrhea.  States it feels it worsens with movement.  Denies vaginal discharge or vaginal bleeding.      PHYSICAL EXAM:   VS: /79 (BP Location: Left arm, Patient Position: Sitting)   Pulse 99   Temp 98 °F (36.7 °C) (Oral)   Resp 22   Ht 162.6 cm (64\")   Wt 104 kg (230 lb)   SpO2 100%   BMI 39.48 kg/m²   GENERAL: Well-appearing middle-aged woman sitting up in stretcher in no acute distress; well-nourished, well-developed, awake, alert, no acute distress, nontoxic appearing, comfortable  EYES: PERRL, sclerae anicteric, extraocular movements grossly intact, symmetric lids  EARS, NOSE, MOUTH, THROAT: atraumatic external nose and ears, moist mucous membranes  NECK: symmetric, trachea midline  RESPIRATORY: unlabored respiratory effort, clear to auscultation bilaterally, good air movement  CARDIOVASCULAR: no murmurs, peripheral pulses 2+ and equal in all extremities  GI: soft, moderate suprapubic tenderness, no diffuse patient no signs, nondistended  MUSCULOSKELETAL/EXTREMITIES: extremities without obvious deformity  SKIN: warm and dry with no obvious rashes  NEUROLOGIC: moving all 4 extremities " symmetrically, CN II-XII grossly intact  PSYCHIATRIC: alert, pleasant and cooperative. Appropriate mood and affect.      MEDICAL DECISION MAKING:    Nicole AGUILAR is a 38 y.o. female presents emergency department due to lower abdominal pain with nausea and vomiting.    Differential Diagnosis Considered: Urinary tract infection, pyelonephritis, pelvic infection, colitis, diverticulitis, constipation    Labs Ordered:  Labs Reviewed   WET PREP, GENITAL - Abnormal; Notable for the following components:       Result Value    WBC'S 1+ WBC's seen (*)     Clue Cells, Wet Prep 1+ Clue cells seen (*)     All other components within normal limits   COMPREHENSIVE METABOLIC PANEL - Abnormal; Notable for the following components:    Glucose 117 (*)     Creatinine 0.53 (*)     Potassium 3.1 (*)     All other components within normal limits    Narrative:     GFR Normal >60  Chronic Kidney Disease <60  Kidney Failure <15     URINALYSIS W/ CULTURE IF INDICATED - Abnormal; Notable for the following components:    Color, UA Dark Yellow (*)     Appearance, UA Cloudy (*)     Ketones, UA 80 mg/dL (3+) (*)     Protein, UA Trace (*)     Leuk Esterase, UA Trace (*)     All other components within normal limits    Narrative:     In absence of clinical symptoms, the presence of pyuria, bacteria, and/or nitrites on the urinalysis result does not correlate with infection.   CBC WITH AUTO DIFFERENTIAL - Abnormal; Notable for the following components:    Lymphocyte % 18.0 (*)     Eosinophil % 0.2 (*)     Neutrophils, Absolute 7.19 (*)     All other components within normal limits   URINALYSIS, MICROSCOPIC ONLY - Abnormal; Notable for the following components:    RBC, UA 0-2 (*)     WBC, UA 0-2 (*)     Bacteria, UA Trace (*)     Mucus, UA Small/1+ (*)     All other components within normal limits   LIPASE - Normal   HCG, SERUM, QUALITATIVE - Normal   LACTIC ACID, PLASMA - Normal   CBC AND DIFFERENTIAL    Narrative:     The following orders were  created for panel order CBC & Differential.  Procedure                               Abnormality         Status                     ---------                               -----------         ------                     CBC Auto Differential[[id number]]        Abnormal            Final result                 Please view results for these tests on the individual orders.        Imaging Ordered:   CT Abdomen Pelvis With Contrast   Final Result   1. No acute abnormality of the abdomen or pelvis.       The above study was initially reviewed and reported by [employer]. I do not   find any discrepancies.                       This report was finalized on 07/21/2023 07:23 by Dr. Robert Mederos MD.          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     History of back surgery     a lift and fusion    Irritable bowel syndrome     Lumbosacral radiculopathy 10/16/2020    Plantar fasciitis     Sciatic nerve pain, left        Past Surgical History:   Procedure Laterality Date    ACHILLES TENDON SURGERY Right 09/21/2022    Procedure: Gastrocnemius vs achilles tendon lengthening, Schmidt osteotomy, cotton osteotomy, Emil bunionectomy, possible tibial bone graft harvesting, and possible posterior tibial tendon advancement and repair- RIGHT;  Surgeon: Jerry Adrian DPM;  Location:  [facility];  Service: Podiatry;  Laterality: Right;    APPENDECTOMY      BUNIONECTOMY Right 09/21/2022    Procedure: Emil bunionectomy;  Surgeon: Jerry Adrian DPM;  Location:  [facility];  Service: Podiatry;  Laterality: Right;    COLONOSCOPY  07/14/2016    Hemorrhoids otherwise normal exam repeat in 10 years    ENDOSCOPY   07/14/2016    LA Grade A reflux esophagitis    LUMBAR DISCECTOMY Left 10/16/2020    Procedure: LEFT L5-S1 MICRODISCECTOMY;  Surgeon: RUBIA Min MD;  Location:  [facility];  Service: Orthopedic Spine;  Laterality: Left;    LUMBAR FUSION      RECESSION GASTROCNEMIUS Right 09/21/2022    Procedure: Gastrocnemius;  Surgeon: Jerry Adrian DPM;  Location:  [facility];  Service: Podiatry;  Laterality: Right;       Allergies   Allergen Reactions    Doxycycline Anaphylaxis     ALL CYCLINES     Tetracyclines & Related Anaphylaxis    Latex Rash       No current facility-administered medications for this encounter.    Current Outpatient Medications:     dicyclomine (BENTYL) 20 MG tablet, Take 1 tablet by mouth Every 6 (Six) Hours As Needed for Abdominal Cramping for up to 7 days., Disp: 28 tablet, Rfl: 0    metroNIDAZOLE (FLAGYL) 500 MG tablet, Take 1 tablet by mouth 2 (Two) Times a Day for 7 days., Disp: 14 tablet, Rfl: 0    ondansetron ODT (ZOFRAN-ODT) 4 MG disintegrating tablet, Place 1 tablet on the tongue Every 6 (Six) Hours As Needed for Nausea or Vomiting for up to 7 days., Disp: 28 tablet, Rfl: 0    Victoza 18 MG/3ML solution pen-injector injection, , Disp: , Rfl:     ARIPiprazole (ABILIFY) 5 MG tablet, Take 1 tablet by mouth Daily., Disp: , Rfl:     buPROPion XL (WELLBUTRIN XL) 150 MG 24 hr tablet, , Disp: , Rfl:     cyclobenzaprine (FLEXERIL) 10 MG tablet, Take 1 tablet by mouth 3 (Three) Times a Day., Disp: , Rfl:     diclofenac-miSOPROStol (ARTHROTEC 75) 75-0.2 MG EC tablet, Take 1 tablet by mouth 2 (Two) Times a Day., Disp: , Rfl:     docusate sodium (COLACE) 100 MG capsule, Take 1 capsule by mouth Every Night., Disp: , Rfl:     gabapentin (NEURONTIN) 600 MG tablet, Take 1 tablet by mouth 3 (Three) Times a Day., Disp: , Rfl:     Naltrexone HCl, Pain, 4.5 MG capsule, , Disp: , Rfl:     sertraline (ZOLOFT) 100 MG tablet, Take 1 tablet by mouth Daily., Disp: , Rfl:     vitamin D (ERGOCALCIFEROL) 1.25 MG  (74192 UT) capsule capsule, , Disp: , Rfl:     Vyvanse 60 MG capsule, , Disp: , Rfl:     My lab interpretation: Wet prep positive for clue cells.  Urinalysis with no evidence of infection.  CMP with slight hypokalemia 3.1.  Lactate normal.  Lipase normal.  CBC with normal blood count hemoglobin.  Negative pregnancy test.    My imaging interpretation: CT abdomen pelvis with IV contrast with no acute findings.    ED Course and Re-evaluation: 37yo F presenting to the emergency department due to lower abdominal pain with nausea vomiting.  Patient fairly well-appearing on arrival.  Her vital signs are reassuring.  She is afebrile.  Given her lower abdominal pain we will consider UTI pelvic infection.  He is not having vaginal discharge so I doubt pelvic inflammatory disease or true ovarian abscess.  More central in nature and suprapubic.  Urinalysis shows no significant infection.  Wet prep did show evidence of clue cells so consider bacterial vaginosis.  Otherwise no evidence of colitis or acute normality.  Patient was given fluids as well as IV droperidol and Zofran for nausea.  Given a dose of Bentyl.  Patient reported improvement of symptoms.  Patient was discharged home with prescription for Bentyl as well as metronidazole for treatment of her mitral vaginosis with plan to follow-up with either her PCP or an OB/GYN as an outpatient given return precautions to the emergency department for worsening symptoms.      ED Diagnosis:  Acute generalized abdominal pain; Nausea and vomiting, unspecified vomiting type; Bacterial vaginosis    Disposition: to home  Follow up plan: PCP or OBGYN follow up within 4 days, return to ED immediately if symptoms worsen        Signed:  Dariel Gonzalez MD  Emergency Medicine Physician    Please note that portions of this note were completed with a voice recognition program.      Dariel Gonzalez MD  07/21/23 [id number]

## 2023-07-21 NOTE — DISCHARGE INSTRUCTIONS
You are seen for your symptoms.  All of your work-up is reassuring except for testing positive for bacterial vaginosis which is a common vaginal infection.  Please take the prescribed metronidazole twice a day for 1 week.  This can sometimes cause nausea so I have also prescribed you nausea medicine.  I have also prescribed you a medicine called Bentyl for treatment of your abdominal pain.  I want you to call your primary care provider schedule close follow-up appointment by early next week.  If you would like to follow-up with an OB/GYN alternatively I have also attached number for our on-call OB/GYN you can call schedule appointment instead.  If any of your symptoms worsen prior please return the emergency department immediately.

## 2023-08-18 NOTE — PROGRESS NOTES
[facility] - PODIATRY    Today's Date: 08/29/23    Patient Name: Nicole AGUILAR  MRN: [id number]  CSN: [id number]  PCP: Anthony Lawler DO  Referring Provider: No ref. provider found    SUBJECTIVE     Chief Complaint   Patient presents with    Follow-up     Anthony Lawler DO 06/03/2023 Return in about 6 months-pt states she is here today for a recheck/no new issues everything is going good-pt reports pain level at 2/10      HPI: Nicole AGUILAR, a 38 y.o.female, comes to clinic as a(n) established patient  11 months s/p right flatfoot reconstruction . Patient has h/o ADHD, foot drop, herniation of intervertebral disc, IBS, radiculopathy, sciatica .  Notes that she has been having some pain in the left foot and seems to be favoring the right foot more. Notes that the right foot is only mildly uncomfortable at times. Notes that she continues to have issues with the left foot due to foot drop. Considering intervention for the LLE, however, weighing options. She has remained in regular shoes.  Admits pain at 2/10 level and described as aching and dull.  She has taken medications as prescribed . Denies any constitutional symptoms. No other pedal complaints at this time.    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     History of back surgery     a lift and fusion    Irritable bowel syndrome     Lumbosacral radiculopathy 10/16/2020    Plantar fasciitis     Sciatic nerve pain, left      Past Surgical History:   Procedure Laterality Date    ACHILLES TENDON SURGERY Right 09/21/2022    Procedure: Gastrocnemius vs achilles tendon lengthening, Schmidt osteotomy, cotton osteotomy, Emil bunionectomy, possible  tibial bone graft harvesting, and possible posterior tibial tendon advancement and repair- RIGHT;  Surgeon: Jerry Adrian DPM;  Location:  [facility];  Service: Podiatry;  Laterality: Right;    APPENDECTOMY      BUNIONECTOMY Right 2022    Procedure: Emil bunionectomy;  Surgeon: Jerry Adrian DPM;  Location:  [facility];  Service: Podiatry;  Laterality: Right;    COLONOSCOPY  2016    Hemorrhoids otherwise normal exam repeat in 10 years    ENDOSCOPY  2016    LA Grade A reflux esophagitis    LUMBAR DISCECTOMY Left 10/16/2020    Procedure: LEFT L5-S1 MICRODISCECTOMY;  Surgeon: RUBIA Min MD;  Location:  [facility];  Service: Orthopedic Spine;  Laterality: Left;    LUMBAR FUSION      RECESSION GASTROCNEMIUS Right 2022    Procedure: Gastrocnemius;  Surgeon: Jerry Adrian DPM;  Location:  [facility];  Service: Podiatry;  Laterality: Right;     Family History   Problem Relation Age of Onset    COPD Father     Heart disease Father         Late developing    Diabetes Father         Late developing    Heart failure Father     Atrial fibrillation Father     Hypertension Father     No Known Problems Mother     Cancer Paternal Grandfather     Breast cancer Maternal Grandmother     Colon cancer Neg Hx     Colon polyps Neg Hx      Social History     Socioeconomic History    Marital status:    Tobacco Use    Smoking status: Every Day     Packs/day: 0.25     Years: 22.00     Pack years: 5.50     Types: Cigarettes     Last attempt to quit: 10/1/2020     Years since quittin.9     Passive exposure: Past    Smokeless tobacco: Never    Tobacco comments:     On and off use, 1-2 cigarettes daily   Vaping Use    Vaping Use: Every day    Substances: Nicotine    Devices: Disposable, Pre-filled or refillable cartridge    Passive vaping exposure: Yes   Substance and Sexual Activity    Alcohol use: Not Currently     Alcohol/week: 7.0 standard drinks     Types: 2 Glasses of wine, 2 Shots of  liquor, 3 Drinks containing 0.5 oz of alcohol per week     Comment: occ 3 to 4 glasses of wine a week    Drug use: No    Sexual activity: Yes     Partners: Male     Birth control/protection: I.U.D.     Allergies   Allergen Reactions    Doxycycline Anaphylaxis     ALL CYCLINES     Tetracyclines & Related Anaphylaxis    Latex Rash     Current Outpatient Medications   Medication Sig Dispense Refill    ARIPiprazole (ABILIFY) 5 MG tablet Take 1 tablet by mouth Daily.      baclofen (LIORESAL) 10 MG tablet       buPROPion XL (WELLBUTRIN XL) 150 MG 24 hr tablet       diclofenac-miSOPROStol (ARTHROTEC 75) 75-0.2 MG EC tablet Take 1 tablet by mouth 2 (Two) Times a Day.      docusate sodium (COLACE) 100 MG capsule Take 1 capsule by mouth Every Night.      gabapentin (NEURONTIN) 600 MG tablet Take 1 tablet by mouth 3 (Three) Times a Day.      Naltrexone HCl, Pain, 4.5 MG capsule 6 mg.      sertraline (ZOLOFT) 100 MG tablet Take 1 tablet by mouth Daily.      Victoza 18 MG/3ML solution pen-injector injection       vitamin D (ERGOCALCIFEROL) 1.25 MG (45725 UT) capsule capsule       Vyvanse 60 MG capsule        No current facility-administered medications for this visit.     Review of Systems   Constitutional:  Negative for chills and fever.   HENT:  Negative for congestion.    Respiratory:  Negative for shortness of breath.    Cardiovascular:  Negative for chest pain and leg swelling.   Gastrointestinal:  Negative for constipation, diarrhea, nausea and vomiting.   Musculoskeletal:  Positive for back pain and gait problem.        Foot pain   Skin:  Negative for wound.   Neurological:  Positive for weakness and numbness.     OBJECTIVE     Vitals:    08/29/23 1322   BP: 124/80   Pulse: 91   SpO2: 99%       PHYSICAL EXAM  GEN:   Accompanied by none.    Foot/Ankle Exam    GENERAL  Appearance:  obese  Orientation:  AAOx3  Affect:  appropriate  Assistance:  cane use  Right shoe gear: casual shoe  Left shoe gear: casual  shoe    VASCULAR     Right Foot Vascularity   Dorsalis pedis:  2+  Posterior tibial:  2+  Skin temperature:  warm  Edema grading:  None  CFT:  3  Pedal hair growth:  Present  Varicosities:  none     Left Foot Vascularity   Dorsalis pedis:  2+  Posterior tibial:  2+  Skin temperature:  warm  Edema grading:  None  CFT:  3  Pedal hair growth:  Present  Varicosities:  none     NEUROLOGIC     Right Foot Neurologic   Normal sensation    Light touch sensation: normal  Vibratory sensation: normal  Hot/Cold sensation: normal     Left Foot Neurologic   Light touch sensation: diminished  Vibratory sensation: diminished  Hot/Cold sensation:  diminished    MUSCULOSKELETAL     Right Foot Musculoskeletal   Tenderness:  none    Arch:  Normal  Hallux valgus: No       Left Foot Musculoskeletal   Tenderness:  peroneal tendon tenderness (ATFL)  Arch:  Pes planus    MUSCLE STRENGTH     Right Foot Muscle Strength   Foot dorsiflexion:  5  Foot plantar flexion:  4+  Foot inversion:  5  Foot eversion:  5     Left Foot Muscle Strength   Foot dorsiflexion:  4-  Foot plantar flexion:  4+  Foot inversion:  4-  Foot eversion:  3    RANGE OF MOTION     Left Foot Range of Motion   Foot and ankle ROM within normal limits    Foot eversion: decreased with pain  Foot inversion: with pain    DERMATOLOGIC      Right Foot Dermatologic   Skin  Right foot skin is intact.      Left Foot Dermatologic   Skin  Left foot skin is intact.      Right foot additional comments: Surgical wounds fully coapted.     RADIOLOGY/NUCLEAR:  No results found.    LABORATORY/CULTURE RESULTS:      PATHOLOGY RESULTS:       ASSESSMENT/PLAN     Diagnoses and all orders for this visit:    1. S/P foot surgery (Primary)    2. Foot drop, left    3. Ankle instability, left    4. DDD (degenerative disc disease), lumbar      Comprehensive lower extremity examination and evaluation was performed.  Discussed findings and treatment plan including risks, benefits, and treatment options with  patient in detail. Patient agreed with treatment plan.  Right lower extremity doing well now approximately 11 months post op. No further follow-ups needed for the RLE.     Discussed potential options for LLE including flatfoot reconstruction with AT transfer or hindfoot fusion.  This will be an ongoing discussion with no immediate plans for intervention.   An After Visit Summary was printed and given to the patient at discharge, including (if requested) any available informative/educational handouts regarding diagnosis, treatment, or medications. All questions were answered to patient/family satisfaction. Should symptoms fail to improve or worsen they agree to call or return to clinic or to go to the Emergency Department. Discussed the importance of following up with any needed screening tests/labs/specialist appointments and any requested follow-up recommended by me today. Importance of maintaining follow-up discussed and patient accepts that missed appointments can delay diagnosis and potentially lead to worsening of conditions.  Return if symptoms worsen or fail to improve., or sooner if acute issues arise.        This document has been electronically signed by Jerry Adrian DPM on August 29, 2023 13:39 CDT

## 2023-08-28 ENCOUNTER — TELEPHONE (OUTPATIENT)
Dept: PODIATRY | Facility: CLINIC | Age: 38
End: 2023-08-28
Payer: MEDICARE

## 2023-08-29 ENCOUNTER — OFFICE VISIT (OUTPATIENT)
Dept: PODIATRY | Facility: CLINIC | Age: 38
End: 2023-08-29
Payer: MEDICARE

## 2023-08-29 VITALS
OXYGEN SATURATION: 99 % | HEIGHT: 64 IN | SYSTOLIC BLOOD PRESSURE: 124 MMHG | HEART RATE: 91 BPM | WEIGHT: 234 LBS | DIASTOLIC BLOOD PRESSURE: 80 MMHG | BODY MASS INDEX: 39.95 KG/M2

## 2023-08-29 DIAGNOSIS — M25.372 ANKLE INSTABILITY, LEFT: ICD-10-CM

## 2023-08-29 DIAGNOSIS — Z98.890 S/P FOOT SURGERY: Primary | ICD-10-CM

## 2023-08-29 DIAGNOSIS — M51.36 DDD (DEGENERATIVE DISC DISEASE), LUMBAR: ICD-10-CM

## 2023-08-29 DIAGNOSIS — M21.372 FOOT DROP, LEFT: ICD-10-CM

## 2023-08-29 RX ORDER — BACLOFEN 10 MG/1
TABLET ORAL
COMMUNITY
Start: 2023-06-23

## 2023-09-13 ENCOUNTER — OFFICE VISIT (OUTPATIENT)
Dept: OBSTETRICS AND GYNECOLOGY | Facility: CLINIC | Age: 38
End: 2023-09-13
Payer: MEDICARE

## 2023-09-13 VITALS
BODY MASS INDEX: 35.85 KG/M2 | HEIGHT: 64 IN | DIASTOLIC BLOOD PRESSURE: 82 MMHG | WEIGHT: 210 LBS | SYSTOLIC BLOOD PRESSURE: 128 MMHG

## 2023-09-13 DIAGNOSIS — E66.9 OBESITY (BMI 30-39.9): ICD-10-CM

## 2023-09-13 DIAGNOSIS — Z01.419 WELL WOMAN EXAM WITH ROUTINE GYNECOLOGICAL EXAM: Primary | ICD-10-CM

## 2023-09-13 DIAGNOSIS — R68.82 DECREASED LIBIDO: ICD-10-CM

## 2023-09-13 DIAGNOSIS — Z12.4 SCREENING FOR CERVICAL CANCER: ICD-10-CM

## 2023-09-13 PROCEDURE — 87624 HPV HI-RISK TYP POOLED RSLT: CPT | Performed by: ADVANCED PRACTICE MIDWIFE

## 2023-09-13 PROCEDURE — G0123 SCREEN CERV/VAG THIN LAYER: HCPCS | Performed by: ADVANCED PRACTICE MIDWIFE

## 2023-09-13 RX ORDER — ACETAMINOPHEN 500 MG
1000 TABLET ORAL 3 TIMES DAILY
COMMUNITY

## 2023-09-13 RX ORDER — NYSTATIN 100000 [USP'U]/G
POWDER TOPICAL
COMMUNITY
Start: 2023-08-04

## 2023-09-13 RX ORDER — DICLOFENAC SODIUM 75 MG/1
TABLET, DELAYED RELEASE ORAL
COMMUNITY
Start: 2023-09-12

## 2023-09-13 NOTE — PROGRESS NOTES
"Subjective     Nicole AGUILAR is a 38 y.o. female    History of Present Illness  Patient arrived for a gyne appointment for an annual well woman examination. She has the Mirena IUD in place as a long-term reversible method of contraception and as a measure for regulation of menses. Reports the IUD was placed 5 years ago. She tries to remain active but does not follow an exercise or diet program. She denies gyne related concerns. She has a primary care provider for general medical concerns, annual labs, and mammogram orders at this time.       /82 (BP Location: Left arm, Patient Position: Sitting)   Ht 162.6 cm (64\")   Wt 95.3 kg (210 lb)   BMI 36.05 kg/m²     Outpatient Encounter Medications as of 9/13/2023   Medication Sig Dispense Refill    acetaminophen (TYLENOL) 500 MG tablet Take 2 tablets by mouth 3 (Three) Times a Day.      ARIPiprazole (ABILIFY) 5 MG tablet Take 1 tablet by mouth Daily.      baclofen (LIORESAL) 10 MG tablet Take 1 tablet by mouth 2 (Two) Times a Day.      diclofenac (VOLTAREN) 75 MG EC tablet       docusate sodium (COLACE) 100 MG capsule Take 1 capsule by mouth Every Night.      gabapentin (NEURONTIN) 600 MG tablet Take 1 tablet by mouth 3 (Three) Times a Day.      Naltrexone HCl, Pain, 4.5 MG capsule 6 mg.      nystatin 910227 UNIT/GM powder       sertraline (ZOLOFT) 100 MG tablet Take 2 tablets by mouth Daily.      vitamin D (ERGOCALCIFEROL) 1.25 MG (61477 UT) capsule capsule Take 1 capsule by mouth Every 7 (Seven) Days.      Vyvanse 60 MG capsule       [DISCONTINUED] buPROPion XL (WELLBUTRIN XL) 150 MG 24 hr tablet       [DISCONTINUED] Victoza 18 MG/3ML solution pen-injector injection       [DISCONTINUED] diclofenac-miSOPROStol (ARTHROTEC 75) 75-0.2 MG EC tablet Take 1 tablet by mouth 2 (Two) Times a Day.       No facility-administered encounter medications on file as of 9/13/2023.       Surgical History  Past Surgical History:   Procedure Laterality Date    ACHILLES TENDON " SURGERY Right 09/21/2022    Procedure: Gastrocnemius vs achilles tendon lengthening, Schmidt osteotomy, cotton osteotomy, Emil bunionectomy, possible tibial bone graft harvesting, and possible posterior tibial tendon advancement and repair- RIGHT;  Surgeon: Jerry Adrian DPM;  Location: [facility] OR;  Service: Podiatry;  Laterality: Right;    APPENDECTOMY      BUNIONECTOMY Right 09/21/2022    Procedure: Emil bunionectomy;  Surgeon: Jerry Adrian DPM;  Location:  [facility] OR;  Service: Podiatry;  Laterality: Right;    CERVICAL BIOPSY  W/ LOOP ELECTRODE EXCISION      COLONOSCOPY  07/14/2016    Hemorrhoids otherwise normal exam repeat in 10 years    ENDOSCOPY  07/14/2016    LA Grade A reflux esophagitis    LUMBAR DISCECTOMY Left 10/16/2020    Procedure: LEFT L5-S1 MICRODISCECTOMY;  Surgeon: RUBIA Min MD;  Location:  [facility] OR;  Service: Orthopedic Spine;  Laterality: Left;    LUMBAR FUSION      RECESSION GASTROCNEMIUS Right 09/21/2022    Procedure: Gastrocnemius;  Surgeon: Jerry Adrian DPM;  Location:  [facility] OR;  Service: Podiatry;  Laterality: Right;    WISDOM TOOTH EXTRACTION  1999       Family History  Family History   Problem Relation Age of Onset    COPD Father     Heart disease Father         Late developing    Diabetes Father         Late developing    Heart failure Father     Atrial fibrillation Father     Hypertension Father     No Known Problems Mother     Cancer Paternal Grandfather     Breast cancer Maternal Grandmother     Colon cancer Neg Hx     Colon polyps Neg Hx        The following portions of the patient's history were reviewed and updated as appropriate: allergies, current medications, past family history, past medical history, past social history, past surgical history, and problem list.    Review of Systems   Constitutional:  Negative for chills, fatigue and fever.   HENT: Negative.     Eyes: Negative.    Respiratory:  Negative for cough, chest tightness and shortness of  breath.    Cardiovascular:  Negative for chest pain, palpitations and leg swelling.   Gastrointestinal:  Negative for abdominal pain, constipation, diarrhea, nausea, vomiting and GERD.   Endocrine: Positive for heat intolerance. Negative for cold intolerance, polydipsia, polyphagia and polyuria.   Genitourinary:  Positive for decreased libido and dyspareunia. Negative for amenorrhea, breast discharge, breast lump, breast pain, decreased urine volume, difficulty urinating, dysuria, flank pain, frequency, genital sores, hematuria, menstrual problem, pelvic pain, pelvic pressure, urgency, urinary incontinence, vaginal bleeding, vaginal discharge and vaginal pain.   Musculoskeletal:  Negative for gait problem.   Skin:  Negative for pallor, rash and skin lesions.   Allergic/Immunologic: Negative for environmental allergies, food allergies and immunocompromised state.   Neurological:  Negative for dizziness, light-headedness and headache.   Hematological:  Negative for adenopathy. Does not bruise/bleed easily.   Psychiatric/Behavioral:  Negative for dysphoric mood, self-injury, suicidal ideas and depressed mood. The patient is not nervous/anxious.      Objective   Physical Exam  Vitals and nursing note reviewed. Exam conducted with a chaperone present.   Constitutional:       General: She is not in acute distress.     Appearance: Normal appearance. She is well-developed and well-groomed. She is obese. She is not ill-appearing or diaphoretic.   HENT:      Head: Normocephalic and atraumatic.      Right Ear: External ear normal.      Left Ear: External ear normal.   Eyes:      General: Lids are normal. No scleral icterus.        Right eye: No discharge.         Left eye: No discharge.      Extraocular Movements: Extraocular movements intact.      Conjunctiva/sclera: Conjunctivae normal.   Neck:      Thyroid: No thyroid mass, thyromegaly or thyroid tenderness.      Trachea: Trachea and phonation normal. No tracheal deviation.    Cardiovascular:      Rate and Rhythm: Normal rate and regular rhythm.      Pulses: Normal pulses.      Heart sounds: Normal heart sounds. No murmur heard.  Pulmonary:      Effort: Pulmonary effort is normal. No accessory muscle usage or respiratory distress.      Breath sounds: Normal breath sounds and air entry. No wheezing.   Chest:      Chest wall: No mass, lacerations, deformity, swelling or tenderness.   Breasts:     Breasts are symmetrical.      Right: No swelling, bleeding, inverted nipple, mass, nipple discharge, skin change or tenderness.      Left: No swelling, bleeding, inverted nipple, mass, nipple discharge, skin change or tenderness.   Abdominal:      General: Bowel sounds are normal. There is no distension.      Palpations: Abdomen is soft. There is no mass.      Tenderness: There is no abdominal tenderness. There is no right CVA tenderness, left CVA tenderness, guarding or rebound.      Hernia: No hernia is present. There is no hernia in the left inguinal area or right inguinal area.   Genitourinary:     General: Normal vulva.      Exam position: Supine.      Pubic Area: No rash or pubic lice.       Labia:         Right: No rash, tenderness, lesion or injury.         Left: No rash, tenderness, lesion or injury.       Urethra: No prolapse, urethral pain, urethral swelling or urethral lesion.      Vagina: No vaginal discharge, erythema, tenderness, bleeding or lesions.      Cervix: No cervical motion tenderness, discharge, friability, lesion, erythema or cervical bleeding.      Uterus: Normal.       Adnexa:         Right: No mass, tenderness or fullness.          Left: No mass, tenderness or fullness.        Rectum: Normal.      Comments: BSU normal. Perineum normal.   Musculoskeletal:         General: No tenderness. Normal range of motion.      Cervical back: Normal range of motion and neck supple. No edema, signs of trauma, rigidity or tenderness. No pain with movement. Normal range of motion.       Right lower leg: No edema.      Left lower leg: No edema.   Lymphadenopathy:      Head:      Right side of head: No submental, submandibular, tonsillar, preauricular, posterior auricular or occipital adenopathy.      Left side of head: No submental, submandibular, tonsillar, preauricular, posterior auricular or occipital adenopathy.      Cervical: No cervical adenopathy.      Upper Body:      Right upper body: No supraclavicular, axillary or pectoral adenopathy.      Left upper body: No supraclavicular, axillary or pectoral adenopathy.      Lower Body: No right inguinal adenopathy. No left inguinal adenopathy.   Skin:     General: Skin is warm and dry.      Capillary Refill: Capillary refill takes less than 2 seconds.      Coloration: Skin is not ashen, cyanotic, jaundiced, mottled, pale or sallow.      Findings: No bruising, erythema, lesion or rash.   Neurological:      General: No focal deficit present.      Mental Status: She is alert and oriented to person, place, and time.      Motor: No abnormal muscle tone.      Gait: Gait normal.   Psychiatric:         Attention and Perception: Attention and perception normal.         Mood and Affect: Mood and affect normal.         Speech: Speech normal.         Behavior: Behavior normal. Behavior is cooperative.         Thought Content: Thought content normal.         Cognition and Memory: Cognition and memory normal.         Judgment: Judgment normal.       Chart reviewed for screenings  Last Pap Smear: No pap smear on chart. Will complete today.    Last Mammogram: 06/02/2023 negative mammogram. Family history of breast cancer in maternal grandmother.  Last Colonoscopy: 07/14/2016 Normal. Recommended repeat in 10 years.  No family history of colon cancer noted.  Last Bone Density Scan: Plan for screening to begin 5 years after the onset of menopause.     Assessment & Plan   Diagnoses and all orders for this visit:    1. Well woman exam with routine gynecological exam  (Primary)  - Gyne exam today. Discussed healthy lifestyle measures and general well-being. Discussed skin self assessments and practice prevention measures, such as wearing sunscreen when outdoors. Encouraged regular exercise and healthy diet. Preventing Vitamin D Deficiency education included in the AVS.  - Breast exam today. Counseled and encouraged self breast exam/self breast awareness, patient is aware how to do self breast exam and the importance of the same. Breast self-awareness education included in the AVS. Discussed screenings with mammography and next mammogram due 06/03/2024.  - Discussed measures of support for mental health, including engaging within a healthy support system, daily exercise, rest as needed, journaling, aromatherapy, massage, music, and mindfulness measures. Mindfulness-Based Stress Reduction education included in the AVS.   - Return to office annually for a well woman exam and as needed for concerns.    2. Screening for cervical cancer  - Pelvic exam today and pap smear obtained within ASCCP guidelines. Declines need for STI testing.  -     Liquid-based Pap Smear, Screening  -     HPV DNA Probe, Direct - ThinPrep Vial, Cervix, Endocervix    3. Decreased libido  - Discussed measures of support, including the use of coconut oil for vaginal dryness, hormone therapy, and medications. Patient to consider options and notify provider if she would desire treatment. Flibanserin Tablets and Bremelanotide Injection education included in the AVS.     4. Obesity (BMI 30-39.9)  - BMI today 36.05 kg/m².  Discussed obesity as a disease treated with healthy lifestyle measures or modifications. Discussed support through the [facility], which focuses on the whole being. Support to include mental/behavioral health, nutrition education, and support groups. BMI for Adults education included in the AVS. Referral for the Weight Management Program offered.    Class 2 Severe Obesity (BMI  >=35 and <=39.9). Obesity-related health conditions include the following:  anxiety, depression, DDD . Obesity is improving with lifestyle changes. BMI is above average, BMI management plan encouraged. We discussed portion control, increasing exercise, and consulting a Bariatric surgeon.        This note has been signed electronically.    Natalie Braden DNP, APRN, CNM, RNC-OB  9/13/2023

## 2023-09-15 LAB
GEN CATEG CVX/VAG CYTO-IMP: NORMAL
HPV I/H RISK 4 DNA CVX QL PROBE+SIG AMP: NOT DETECTED
LAB AP CASE REPORT: NORMAL
LAB AP GYN ADDITIONAL INFORMATION: NORMAL
LAB AP GYN OTHER FINDINGS: NORMAL
Lab: NORMAL
PATH INTERP SPEC-IMP: NORMAL
STAT OF ADQ CVX/VAG CYTO-IMP: NORMAL

## 2023-11-15 ENCOUNTER — TELEPHONE (OUTPATIENT)
Dept: GASTROENTEROLOGY | Facility: CLINIC | Age: 38
End: 2023-11-15

## 2023-11-15 NOTE — TELEPHONE ENCOUNTER
Caller: Nicole AGUILAR    Relationship to patient: Self    Best call back number: [phone]    Patient is needing: PATIENT HAS RECEIVED ANOTHER LETTER ABOUT SCHEDULING RECALL SCOPE. AT THIS TIME SHE DOES NOT WISH TO SCHEDULE THIS PROCEDURE AND WILL CALL IF/WHEN THAT CHANGES.

## 2024-08-05 ENCOUNTER — TELEPHONE (OUTPATIENT)
Dept: OBSTETRICS AND GYNECOLOGY | Age: 39
End: 2024-08-05
Payer: MEDICARE

## 2024-08-05 NOTE — TELEPHONE ENCOUNTER
Pt called stating that her IUD fell out and is wanting to to get an oral birth control until she schedules and IUD appointment to sign a new consent. Last annual 9-13-23. Please advise

## 2024-08-06 NOTE — TELEPHONE ENCOUNTER
Pt returned call to the office and was informed she cannot do ocp as she is a smoker and over 35.  I informed her that Dr. Herr recommends doing condoms and vaginal spermicide suppositories until a new IUD can be ordered, pt voiced understanding.

## 2024-08-06 NOTE — TELEPHONE ENCOUNTER
The patient cannot have oral contraceptive pills, as she is a smoker over the age of 33.  She will need to use some other form of contraception; I recommend a combination of condoms and vaginal spermicide suppositories, until we can get her a new IUD.  Please let her know.

## 2024-08-16 ENCOUNTER — OFFICE VISIT (OUTPATIENT)
Dept: OBSTETRICS AND GYNECOLOGY | Age: 39
End: 2024-08-16
Payer: MEDICARE

## 2024-08-16 ENCOUNTER — TELEPHONE (OUTPATIENT)
Dept: OBSTETRICS AND GYNECOLOGY | Age: 39
End: 2024-08-16

## 2024-08-16 VITALS
HEIGHT: 64 IN | BODY MASS INDEX: 27.66 KG/M2 | SYSTOLIC BLOOD PRESSURE: 122 MMHG | DIASTOLIC BLOOD PRESSURE: 78 MMHG | WEIGHT: 162 LBS

## 2024-08-16 DIAGNOSIS — Z30.09 COUNSELING FOR BIRTH CONTROL REGARDING INTRAUTERINE DEVICE (IUD): Primary | ICD-10-CM

## 2024-08-16 DIAGNOSIS — F17.200 NICOTINE DEPENDENCE, UNCOMPLICATED, UNSPECIFIED NICOTINE PRODUCT TYPE: ICD-10-CM

## 2024-08-16 PROCEDURE — 99214 OFFICE O/P EST MOD 30 MIN: CPT | Performed by: NURSE PRACTITIONER

## 2024-08-16 PROCEDURE — 1160F RVW MEDS BY RX/DR IN RCRD: CPT | Performed by: NURSE PRACTITIONER

## 2024-08-16 PROCEDURE — 1159F MED LIST DOCD IN RCRD: CPT | Performed by: NURSE PRACTITIONER

## 2024-08-16 RX ORDER — GABAPENTIN 800 MG/1
TABLET ORAL
COMMUNITY
Start: 2024-07-31

## 2024-08-16 RX ORDER — ACETAMINOPHEN AND CODEINE PHOSPHATE 120; 12 MG/5ML; MG/5ML
1 SOLUTION ORAL DAILY
Qty: 28 TABLET | Refills: 1 | Status: SHIPPED | OUTPATIENT
Start: 2024-08-16 | End: 2025-08-16

## 2024-08-16 RX ORDER — LISDEXAMFETAMINE DIMESYLATE 70 MG/1
CAPSULE ORAL
COMMUNITY
Start: 2024-07-02

## 2024-08-16 NOTE — TELEPHONE ENCOUNTER
Meant to tell pt in office that I want an ultrasound before we do IUD placement to ensure other is completely out. Can be a different day or same day as IUD insertion.

## 2024-08-16 NOTE — PROGRESS NOTES
Chief Complaint   Patient presents with    Contraception     Patient here due to IUD falling out August 2nd, patient is wanting a new one ordered today.       History:  Nicole AGUILAR is a 39 y.o. female who presents today for follow-up for evaluation of the above:  Pt comes in today for IUD consultation. Pt had IUD and felt strings close to vaginal opening so she removed        ROS:  Review of Systems   Constitutional:  Negative for activity change and unexpected weight loss.   Cardiovascular:  Negative for chest pain.   Gastrointestinal:  Negative for blood in stool, constipation and diarrhea.   Endocrine: Negative for cold intolerance and heat intolerance.   Genitourinary:  Negative for dyspareunia, pelvic pain and vaginal discharge.   Musculoskeletal:  Negative for arthralgias, back pain, neck pain and neck stiffness.   Skin:  Negative for rash.   Neurological:  Negative for dizziness and headache.   Psychiatric/Behavioral:  Negative for sleep disturbance. The patient is not nervous/anxious.        Ms. AGUILAR  reports that she has been smoking cigarettes. She started smoking about 25 years ago. She has a 5.5 pack-year smoking history. She has been exposed to tobacco smoke. She has never used smokeless tobacco. She reports current alcohol use of about 7.0 standard drinks of alcohol per week. She reports current drug use. Drug: Marijuana.      Current Outpatient Medications:     acetaminophen (TYLENOL) 500 MG tablet, Take 2 tablets by mouth 3 (Three) Times a Day., Disp: , Rfl:     ARIPiprazole (ABILIFY) 5 MG tablet, Take 1 tablet by mouth Daily., Disp: , Rfl:     baclofen (LIORESAL) 10 MG tablet, Take 1 tablet by mouth 2 (Two) Times a Day., Disp: , Rfl:     diclofenac (VOLTAREN) 75 MG EC tablet, , Disp: , Rfl:     gabapentin (NEURONTIN) 800 MG tablet, , Disp: , Rfl:     Naltrexone HCl, Pain, 4.5 MG capsule, 6 mg., Disp: , Rfl:     sertraline (ZOLOFT) 100 MG tablet, Take 2 tablets by mouth Daily., Disp: , Rfl:      "vitamin D (ERGOCALCIFEROL) 1.25 MG (74828 UT) capsule capsule, Take 1 capsule by mouth Every 7 (Seven) Days., Disp: , Rfl:     Vyvanse 70 MG capsule, , Disp: , Rfl:     Levonorgestrel (MIRENA) 20 MCG/DAY intrauterine device IUD, To be inserted one time by prescriber. Route intrauterine., Disp: 1 each, Rfl: 0    norethindrone (MICRONOR) 0.35 MG tablet, Take 1 tablet by mouth Daily., Disp: 28 tablet, Rfl: 1      OBJECTIVE:  /78 (BP Location: Left arm, Patient Position: Sitting, Cuff Size: Adult)   Ht 162.6 cm (64\")   Wt 73.5 kg (162 lb)   LMP 08/14/2024   BMI 27.81 kg/m²    Physical Exam  Vitals and nursing note reviewed.   Constitutional:       Appearance: She is well-developed.   HENT:      Head: Normocephalic and atraumatic.   Eyes:      General:         Right eye: No discharge.         Left eye: No discharge.      Conjunctiva/sclera: Conjunctivae normal.   Neck:      Thyroid: No thyromegaly.   Cardiovascular:      Rate and Rhythm: Normal rate and regular rhythm.      Heart sounds: Normal heart sounds. No murmur heard.  Pulmonary:      Effort: Pulmonary effort is normal.      Breath sounds: Normal breath sounds.   Musculoskeletal:      Cervical back: Normal range of motion and neck supple.   Skin:     General: Skin is warm and dry.   Neurological:      Mental Status: She is alert and oriented to person, place, and time.   Psychiatric:         Mood and Affect: Mood normal.         Behavior: Behavior normal.         Thought Content: Thought content normal.         Judgment: Judgment normal.         Assessment/Plan    Diagnoses and all orders for this visit:    1. Counseling for birth control regarding intrauterine device (IUD) (Primary)  -     Levonorgestrel (MIRENA) 20 MCG/DAY intrauterine device IUD; To be inserted one time by prescriber. Route intrauterine.  Dispense: 1 each; Refill: 0  -     norethindrone (MICRONOR) 0.35 MG tablet; Take 1 tablet by mouth Daily.  Dispense: 28 tablet; Refill: 1    2. " Nicotine dependence, uncomplicated, unspecified nicotine product type    Pt comes in today requesting IUD. Pt had had one but strings she felt close to vaginal opening so she pulled it out. Did well on it previously. Pt is requesting micronor while she waits for device. Does understand potential of decreased effectiveness.        An After Visit Summary was printed and given to the patient at discharge.  Return for IUD insertion. Sooner if problems arise.          COLBY Duncan  Electronically Signed

## 2024-08-27 ENCOUNTER — OFFICE VISIT (OUTPATIENT)
Dept: OBSTETRICS AND GYNECOLOGY | Age: 39
End: 2024-08-27
Payer: MEDICARE

## 2024-08-27 VITALS
BODY MASS INDEX: 27.66 KG/M2 | DIASTOLIC BLOOD PRESSURE: 76 MMHG | WEIGHT: 162 LBS | SYSTOLIC BLOOD PRESSURE: 124 MMHG | RESPIRATION RATE: 18 BRPM | HEIGHT: 64 IN

## 2024-08-27 DIAGNOSIS — Z30.9 ENCOUNTER FOR CONTRACEPTIVE MANAGEMENT, UNSPECIFIED TYPE: ICD-10-CM

## 2024-08-27 DIAGNOSIS — Z30.430 ENCOUNTER FOR IUD INSERTION: Primary | ICD-10-CM

## 2024-08-27 LAB
B-HCG UR QL: NEGATIVE
EXPIRATION DATE: NORMAL
INTERNAL NEGATIVE CONTROL: NORMAL
INTERNAL POSITIVE CONTROL: NORMAL
Lab: NORMAL

## 2024-08-27 RX ORDER — LORAZEPAM 0.5 MG/1
TABLET ORAL
COMMUNITY
Start: 2024-08-22

## 2024-08-27 NOTE — PROGRESS NOTES
Subjective   Nicole AGUILAR is a 39 y.o. female.     History of Present Illness  The patient presents to [facility] office today for Mirena IUD insertion.   Contraception  This is a new problem. The current episode started today. The problem occurs daily. The problem has been waxing and waning. Pertinent negatives include no abdominal pain, anorexia, arthralgias, change in bowel habit, chest pain, chills, congestion, coughing, diaphoresis, fatigue, fever, headaches, joint swelling, myalgias, nausea, neck pain, numbness, rash, sore throat, swollen glands, urinary symptoms, vertigo, visual change, vomiting or weakness. Nothing aggravates the symptoms. She has tried nothing for the symptoms. The treatment provided no relief.            Review of Systems   Constitutional: Negative.  Negative for chills, diaphoresis, fatigue and fever.   HENT: Negative.  Negative for congestion, sore throat and swollen glands.    Eyes: Negative.    Respiratory: Negative.  Negative for cough.    Cardiovascular: Negative.  Negative for chest pain.   Gastrointestinal: Negative.  Negative for abdominal pain, anorexia, change in bowel habit, nausea and vomiting.   Endocrine: Negative.    Genitourinary: Negative.  Negative for menstrual problem, pelvic pain, vaginal bleeding, vaginal discharge and vaginal pain.   Musculoskeletal: Negative.  Negative for arthralgias, joint swelling, myalgias and neck pain.   Skin: Negative.  Negative for rash.   Allergic/Immunologic: Negative.    Neurological: Negative.  Negative for vertigo, weakness and numbness.   Hematological: Negative.    Psychiatric/Behavioral: Negative.         Objective   Physical Exam  Vitals and nursing note reviewed. Exam conducted with a chaperone present.   Constitutional:       Appearance: She is well-developed.   HENT:      Head: Normocephalic and atraumatic.   Abdominal:      General: There is no distension.      Palpations: Abdomen is soft.      Tenderness: There is no  abdominal tenderness.   Genitourinary:     General: Normal vulva.      Exam position: Lithotomy position.      Labia:         Right: No rash, tenderness, lesion or injury.         Left: No rash, tenderness, lesion or injury.       Vagina: Normal. No vaginal discharge, erythema or tenderness.      Cervix: No cervical motion tenderness, discharge or friability.      Uterus: Not deviated, not enlarged and not tender.       Adnexa:         Right: No mass, tenderness or fullness.          Left: No mass, tenderness or fullness.     Skin:     General: Skin is warm and dry.   Neurological:      Mental Status: She is alert and oriented to person, place, and time.   Psychiatric:         Behavior: Behavior normal.         Thought Content: Thought content normal.         Judgment: Judgment normal.       Assessment & Plan   Diagnoses and all orders for this visit:    1. Encounter for IUD insertion (Primary)  Comments:  The patient presents to the office today for Mirena IUD insertion. UPT negative. Ultrasound confirms previous IUD is absent. After care and ER precautions discussed. Will follow up in 4 weeks with ultrasound and office visit.   Orders:  -     POC Pregnancy, Urine  -     Chlamydia trachomatis, Neisseria gonorrhoeae, Trichomonas vaginalis, PCR - Swab, Vagina    2. Encounter for contraceptive management, unspecified type  Comments:  Patient is concerned for IUD effectiveness as previous IUD was self-expulsed. She has a complex medical/surgical history that would make pregnancy result in complications for her. An IUD has been inserted today but she desires tubal ligation in the future as she does not desire pregnancy. Consent form signed in the office today for Sterilization with Tubal Ligation. She will follow up in 4 weeks for an ultrasound, office visit with MD for consultation for this.     Nicole AGUILAR is a 39 y.o. female  is here for IUD insertion.  Last menstrual period was 8/14/2024.  Her last Pap smear was  "2023 and normal.  She has no history of cervical dysplasia.    /76   Resp 18   Ht 162.6 cm (64\")   Wt 73.5 kg (162 lb)   LMP 2024   BMI 27.81 kg/m²      A urine pregnancy test in the office today is negative.    We have discussed the IUD, common side effects, and potential adverse events like uterine perforation, infection, or expulsion.  She has signed the consent form after answering her questions. A verbal timeout was completed with myself and the patient prior to the beginning of the procedure to verify correct patient, , and procedure to be completed. All information was verified correctly.     MIRENA IUD lot number [id number] was placed today.  The cervix was visualized and a sample was obtained for gonorrhea and chlamydia testing. The cervix was then cleansed with BETADINE swabs.  The anterior lip was grasped with a single-tooth tenaculum.  The uterus sounded to 7 cm.  The IUD was placed at the uterine fundus using sterile technique in a standard fashion.  The applicator was removed and the strings trimmed to 3 cm length.  The tenaculum site was made hemostatic with silver nitrate sticks. She tolerated the procedure well.    Assessment: IUD placement.    Nicole AGUILAR will return in one month for an IUD check.  She is given instructions to call if she has any fevers, heavy bleeding, severe pain, or nausea.        COLBY Caldwell   "

## 2024-08-29 LAB
C TRACH RRNA SPEC QL NAA+PROBE: NEGATIVE
N GONORRHOEA RRNA SPEC QL NAA+PROBE: NEGATIVE
T VAGINALIS RRNA SPEC QL NAA+PROBE: NEGATIVE

## 2024-09-19 ENCOUNTER — OFFICE VISIT (OUTPATIENT)
Age: 39
End: 2024-09-19
Payer: MEDICARE

## 2024-09-19 VITALS
BODY MASS INDEX: 29.19 KG/M2 | WEIGHT: 171 LBS | DIASTOLIC BLOOD PRESSURE: 90 MMHG | SYSTOLIC BLOOD PRESSURE: 128 MMHG | HEIGHT: 64 IN

## 2024-09-19 DIAGNOSIS — N76.0 BV (BACTERIAL VAGINOSIS): Primary | ICD-10-CM

## 2024-09-19 DIAGNOSIS — B96.89 BV (BACTERIAL VAGINOSIS): Primary | ICD-10-CM

## 2024-09-19 DIAGNOSIS — Z12.31 ENCOUNTER FOR SCREENING MAMMOGRAM FOR BREAST CANCER: ICD-10-CM

## 2024-09-19 DIAGNOSIS — Z97.5 IUD (INTRAUTERINE DEVICE) IN PLACE: ICD-10-CM

## 2024-09-21 LAB
A VAGINAE DNA VAG QL NAA+PROBE: NORMAL SCORE
BVAB2 DNA VAG QL NAA+PROBE: NORMAL SCORE
C ALBICANS DNA VAG QL NAA+PROBE: NEGATIVE
C GLABRATA DNA VAG QL NAA+PROBE: NEGATIVE
MEGA1 DNA VAG QL NAA+PROBE: NORMAL SCORE
T VAGINALIS DNA VAG QL NAA+PROBE: NEGATIVE

## 2024-09-24 ENCOUNTER — OFFICE VISIT (OUTPATIENT)
Age: 39
End: 2024-09-24
Payer: MEDICARE

## 2024-09-24 VITALS
WEIGHT: 169.8 LBS | SYSTOLIC BLOOD PRESSURE: 108 MMHG | HEIGHT: 64 IN | BODY MASS INDEX: 28.99 KG/M2 | DIASTOLIC BLOOD PRESSURE: 78 MMHG

## 2024-09-24 DIAGNOSIS — N92.0 MENORRHAGIA WITH REGULAR CYCLE: Primary | ICD-10-CM

## 2024-09-24 DIAGNOSIS — N94.6 DYSMENORRHEA: ICD-10-CM

## 2024-09-24 RX ORDER — SODIUM CHLORIDE 9 MG/ML
40 INJECTION, SOLUTION INTRAVENOUS AS NEEDED
OUTPATIENT
Start: 2024-09-24

## 2024-09-24 RX ORDER — SODIUM CHLORIDE 0.9 % (FLUSH) 0.9 %
1-10 SYRINGE (ML) INJECTION AS NEEDED
OUTPATIENT
Start: 2024-09-24

## 2024-09-24 RX ORDER — SODIUM CHLORIDE 9 MG/ML
100 INJECTION, SOLUTION INTRAVENOUS CONTINUOUS
OUTPATIENT
Start: 2024-09-24

## 2024-09-24 RX ORDER — SODIUM CHLORIDE 0.9 % (FLUSH) 0.9 %
10 SYRINGE (ML) INJECTION EVERY 12 HOURS SCHEDULED
OUTPATIENT
Start: 2024-09-24

## 2024-09-25 ENCOUNTER — TELEPHONE (OUTPATIENT)
Age: 39
End: 2024-09-25
Payer: MEDICARE

## 2024-10-13 NOTE — PROGRESS NOTES
[facility]  Nicole AGUILAR  : 1985  MRN: [id number]  CSN: [id number]    History and Physical    Subjective   Nicole AGUILAR is a 39 y.o. year old  who presents for consultation about surgery due to continues heavy, irregular menses with severe pain despite her second IUD. Pt with prior IUD which resulted in expulsion. Pt is worried the IUD will fail.  IUD is in place today on pelvic US however pt reports severe dysmenorrhea and heavy menses despite the IUD.  Pt and I discussed options including LSC tubal ligation with endometrial ablation as well as hysterectomy. Pt would like to proceed with definitive tx with TLH/bilateral salpingectomy on .  Pt was initially wanting LSC bilateral salpingectomy, however she is now  desiring definitive tx.    Past Medical History:   Diagnosis Date    Abnormal Pap smear of cervix     ADHD (attention deficit hyperactivity disorder)     pt states as a child    Anxiety and depression     Bunion     Callus     DDD (degenerative disc disease), lumbar     Difficulty walking     Fallen arches     Foot drop, left foot     Hammer toe     Herniation of intervertebral disc between L5 and S1     History of back surgery     a lift and fusion    Irritable bowel syndrome     Lumbosacral radiculopathy 10/16/2020    Migraine 2000    Plantar fasciitis     PMS (premenstrual syndrome)     Sciatic nerve pain, left     Spinal headache     When drain was pulled after spinal fusion    Varicella      Past Surgical History:   Procedure Laterality Date    ACHILLES TENDON SURGERY Right 2022    Procedure: Gastrocnemius vs achilles tendon lengthening, Schmidt osteotomy, cotton osteotomy, Emil bunionectomy, possible tibial bone graft harvesting, and possible posterior tibial tendon advancement and repair- RIGHT;  Surgeon: Jerry Adrian DPM;  Location: [facility];  Service: Podiatry;  Laterality: Right;    APPENDECTOMY      BUNIONECTOMY Right 2022    Procedure: Emil  bunionectomy;  Surgeon: Jerry Adrian DPM;  Location:  [facility];  Service: Podiatry;  Laterality: Right;    CERVICAL BIOPSY  W/ LOOP ELECTRODE EXCISION      COLONOSCOPY  2016    Hemorrhoids otherwise normal exam repeat in 10 years    ENDOSCOPY  2016    LA Grade A reflux esophagitis    LUMBAR DISCECTOMY Left 10/16/2020    Procedure: LEFT L5-S1 MICRODISCECTOMY;  Surgeon: RUBIA Min MD;  Location:  [facility];  Service: Orthopedic Spine;  Laterality: Left;    LUMBAR FUSION      RECESSION GASTROCNEMIUS Right 2022    Procedure: Gastrocnemius;  Surgeon: Jerry Adrian DPM;  Location:  [facility];  Service: Podiatry;  Laterality: Right;    WISDOM TOOTH EXTRACTION       Social History    Tobacco Use      Smoking status: Every Day        Packs/day: 0.00        Years: 0.3 packs/day for 22.0 years (5.5 ttl pk-yrs)        Types: Cigarettes        Start date: 10/1/1998        Last attempt to quit: 10/1/2020        Years since quittin.0        Passive exposure: Past      Smokeless tobacco: Never      Tobacco comments: On and off use, 1-4 cigarettes daily      Current Outpatient Medications:     acetaminophen (TYLENOL) 500 MG tablet, Take 2 tablets by mouth 3 (Three) Times a Day., Disp: , Rfl:     ARIPiprazole (ABILIFY) 5 MG tablet, Take 1 tablet by mouth Daily., Disp: , Rfl:     baclofen (LIORESAL) 10 MG tablet, Take 1 tablet by mouth 2 (Two) Times a Day., Disp: , Rfl:     diclofenac (VOLTAREN) 75 MG EC tablet, , Disp: , Rfl:     gabapentin (NEURONTIN) 800 MG tablet, , Disp: , Rfl:     Naltrexone HCl, Pain, 4.5 MG capsule, 6 mg., Disp: , Rfl:     sertraline (ZOLOFT) 100 MG tablet, Take 2 tablets by mouth Daily., Disp: , Rfl:     vitamin D (ERGOCALCIFEROL) 1.25 MG (53454 UT) capsule capsule, Take 1 capsule by mouth Every 7 (Seven) Days., Disp: , Rfl:     Vyvanse 70 MG capsule, , Disp: , Rfl:     Levonorgestrel (MIRENA) 20 MCG/DAY intrauterine device IUD, To be inserted one time by  "prescriber. Route intrauterine., Disp: 1 each, Rfl: 0    Allergies   Allergen Reactions    Doxycycline Anaphylaxis     ALL CYCLINES     Tetracyclines & Related Anaphylaxis    Latex Rash       Family History   Problem Relation Age of Onset    COPD Father     Heart disease Father         Late developing    Diabetes Father         Late developing    Heart failure Father     Atrial fibrillation Father     Hypertension Father     No Known Problems Mother     Cancer Paternal Grandfather     Breast cancer Maternal Grandmother     Colon cancer Neg Hx     Colon polyps Neg Hx      Review of Systems   Constitutional:  Negative for activity change, appetite change and fatigue.   Respiratory:  Negative for cough, chest tightness, shortness of breath and wheezing.    Cardiovascular:  Negative for chest pain and palpitations.   Gastrointestinal:  Negative for constipation, diarrhea, nausea and vomiting.   Genitourinary:  Positive for dyspareunia, menstrual problem, pelvic pain, vaginal bleeding and vaginal pain. Negative for dysuria and vaginal discharge.         Objective   /78   Ht 162.6 cm (64.02\")   Wt 77 kg (169 lb 12.8 oz)   LMP 08/20/2024 (Approximate)   BMI 29.13 kg/m²     Physical Exam   Physical Exam  Constitutional:       Appearance: Normal appearance.   Cardiovascular:      Rate and Rhythm: Normal rate and regular rhythm.      Pulses: Normal pulses.      Heart sounds: Normal heart sounds.   Pulmonary:      Effort: Pulmonary effort is normal.      Breath sounds: Normal breath sounds.   Abdominal:      General: Abdomen is flat. Bowel sounds are normal.      Palpations: Abdomen is soft.   Musculoskeletal:      Cervical back: Normal range of motion and neck supple.   Neurological:      Mental Status: She is alert.   Psychiatric:         Mood and Affect: Mood normal.         Behavior: Behavior normal.         Labs  Lab Results   Component Value Date     07/21/2023    HGB 13.9 07/21/2023    HCT 42.4 " 07/21/2023    WBC 9.61 07/21/2023     07/21/2023    K 3.1 (L) 07/21/2023     07/21/2023    CO2 22.0 07/21/2023    BUN 8 07/21/2023    CREATININE 0.53 (L) 07/21/2023    GLUCOSE 117 (H) 07/21/2023    ALBUMIN 4.7 07/21/2023    CALCIUM 9.7 07/21/2023    AST 14 07/21/2023    ALT 12 07/21/2023    BILITOT 0.6 07/21/2023        Assessment & Plan    Diagnoses and all orders for this visit:    1. Menorrhagia with regular cycle (Primary)  -     Basic Metabolic Panel; Future  -     sodium chloride 0.9 % flush 10 mL  -     sodium chloride 0.9 % flush 1-10 mL  -     sodium chloride 0.9 % infusion 40 mL  -     sodium chloride 0.9 % infusion  -     ceFAZolin (ANCEF) 2,000 mg in sodium chloride 0.9 % 100 mL IVPB  -     Case Request; Standing  -     CBC (No Diff); Future  -     Case Request  -     CBC & Differential; Future  -     Basic Metabolic Panel; Future  -     sodium chloride 0.9 % flush 10 mL  -     sodium chloride 0.9 % flush 1-10 mL  -     sodium chloride 0.9 % infusion 40 mL  -     sodium chloride 0.9 % infusion  -     ceFAZolin (ANCEF) 2,000 mg in sodium chloride 0.9 % 100 mL IVPB  -     Case Request; Standing  -     Case Request  Menorrhagia with severe dysmenorrhea despite her second IUD.  Pt would like to proceed with definitive tx with TLH/bilateral salpingectomy on 11/14.  Discussed procedure including risks and possible complications including infection, bleeding, damage to surrounding organs. Pt will RTC for preop.  2. Dysmenorrhea    Other orders  -     Follow Anesthesia Guidelines / Protocol; Future  -     Follow Anesthesia Guidelines / Protocol; Standing  -     Verify / Perform Chlorhexidine Skin Prep; Standing  -     Obtain Informed Consent; Future  -     Provide NPO Instructions to Patient; Future  -     Chlorhexidine Skin Prep; Future  -     Type & Screen; Standing  -     Insert Peripheral IV; Standing  -     Saline Lock & Maintain IV Access; Standing  -     Place Sequential Compression Device;  Standing  -     Maintain Sequential Compression Device; Standing  -     Follow Anesthesia Guidelines / Protocol; Future  -     Follow Anesthesia Guidelines / Protocol; Standing  -     Verify / Perform Chlorhexidine Skin Prep; Standing  -     Obtain Informed Consent; Future  -     Provide NPO Instructions to Patient; Future  -     Chlorhexidine Skin Prep; Future  -     Type & Screen; Standing  -     Insert Peripheral IV; Standing  -     Saline Lock & Maintain IV Access; Standing  -     Place Sequential Compression Device; Standing  -     Maintain Sequential Compression Device; Standing        Karime Richey MD  10/13/2024  10:25 CDT

## 2024-10-15 ENCOUNTER — TELEPHONE (OUTPATIENT)
Dept: OBSTETRICS AND GYNECOLOGY | Age: 39
End: 2024-10-15
Payer: MEDICARE

## 2024-10-15 NOTE — TELEPHONE ENCOUNTER
Pt called to cancel her upcoming surgery schedule on 11/14/24.  Pt states she will reschedule another time does not want to reschedule now.

## 2025-04-29 ENCOUNTER — APPOINTMENT (OUTPATIENT)
Dept: GENERAL RADIOLOGY | Facility: HOSPITAL | Age: 40
End: 2025-04-29
Payer: MEDICARE

## 2025-04-29 ENCOUNTER — HOSPITAL ENCOUNTER (EMERGENCY)
Facility: HOSPITAL | Age: 40
Discharge: HOME | End: 2025-04-29
Payer: MEDICARE

## 2025-04-29 VITALS
HEART RATE: 116 BPM | TEMPERATURE: 98.3 F | OXYGEN SATURATION: 100 % | RESPIRATION RATE: 16 BRPM | SYSTOLIC BLOOD PRESSURE: 123 MMHG | DIASTOLIC BLOOD PRESSURE: 86 MMHG | WEIGHT: 160 LBS | HEIGHT: 64 IN | BODY MASS INDEX: 27.31 KG/M2
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
## 2025-04-29 DIAGNOSIS — S42.292A CLOSED FRACTURE OF HEAD OF LEFT HUMERUS, INITIAL ENCOUNTER: Primary | ICD-10-CM

## 2025-04-29 PROCEDURE — 99283 EMERGENCY DEPT VISIT LOW MDM: CPT

## 2025-04-29 PROCEDURE — 73030 X-RAY EXAM OF SHOULDER: CPT

## 2025-04-29 PROCEDURE — 96372 THER/PROPH/DIAG INJ SC/IM: CPT

## 2025-04-29 PROCEDURE — 73060 X-RAY EXAM OF HUMERUS: CPT

## 2025-04-29 PROCEDURE — 73070 X-RAY EXAM OF ELBOW: CPT

## 2025-04-29 PROCEDURE — 25010000002 HYDROMORPHONE 1 MG/ML SOLUTION: Performed by: FAMILY MEDICINE

## 2025-04-29 PROCEDURE — 63710000001 ONDANSETRON ODT 4 MG TABLET DISPERSIBLE: Performed by: FAMILY MEDICINE

## 2025-04-29 RX ORDER — ONDANSETRON 4 MG/1
4 TABLET, ORALLY DISINTEGRATING ORAL ONCE
Status: COMPLETED | OUTPATIENT
Start: 2025-04-29 | End: 2025-04-29

## 2025-04-29 RX ORDER — OXYCODONE AND ACETAMINOPHEN 7.5; 325 MG/1; MG/1
1 TABLET ORAL ONCE
Refills: 0 | Status: COMPLETED | OUTPATIENT
Start: 2025-04-29 | End: 2025-04-29

## 2025-04-29 RX ORDER — OXYCODONE AND ACETAMINOPHEN 5; 325 MG/1; MG/1
1 TABLET ORAL EVERY 8 HOURS PRN
Qty: 12 TABLET | Refills: 0 | Status: SHIPPED | OUTPATIENT
Start: 2025-04-29

## 2025-04-29 RX ADMIN — HYDROMORPHONE HYDROCHLORIDE 1 MG: 1 INJECTION, SOLUTION INTRAMUSCULAR; INTRAVENOUS; SUBCUTANEOUS at 20:09

## 2025-04-29 RX ADMIN — ONDANSETRON 4 MG: 4 TABLET, ORALLY DISINTEGRATING ORAL at 20:09

## 2025-04-29 RX ADMIN — OXYCODONE HYDROCHLORIDE AND ACETAMINOPHEN 1 TABLET: 7.5; 325 TABLET ORAL at 22:31
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
## 2025-04-30 NOTE — DISCHARGE INSTRUCTIONS
It was very nice to meet you, Nicole. Thank you for allowing us to take care of you today at [facility].    Sent in some pain medication to the pharmacy, you will need to follow up with PCP, and ortho this week.  Come back to the ER if you decide that you want further workup done from your injuries.  Ice your left shoulder. Keep It in the sling for comfort.    Please understand that an ER evaluation is just the start of your evaluation. We will do what we can, but we are often unable to fully figure out what is causing your symptoms from one evaluation. Thus, our primary goal is to determine whether you need to be evaluated in the hospital or if it is safe for you to go home and see other doctors such as a primary care physician or a specialist on an outpatient basis.     Like we discussed, it is VERY IMPORTANT that you follow up with your primary care doctor (call them to set up an appointment) within the next few days or as soon as possible so that you can be re-evaluated for improvement in your symptoms or for any other questions.     Please return to the emergency room within 12-48 hours if you experience fever, chills, chest pain or shortness of breath, pain with inspiration/expiration, pain that travels to your arms, neck or back, nausea, vomiting, severe headache, tearing pain in your chest, dizziness, feel as though you are about to pass out, have any worsening symptoms, or any other concerns.

## 2025-05-01 ENCOUNTER — TELEPHONE (OUTPATIENT)
Age: 40
End: 2025-05-01
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
## 2025-05-05 ENCOUNTER — OFFICE VISIT (OUTPATIENT)
Age: 40
End: 2025-05-05
Payer: MEDICARE

## 2025-05-05 VITALS — HEIGHT: 64 IN | BODY MASS INDEX: 26.46 KG/M2 | WEIGHT: 155 LBS

## 2025-05-05 DIAGNOSIS — M79.602 LEFT ARM PAIN: Primary | ICD-10-CM

## 2025-05-05 DIAGNOSIS — S42.292A CLOSED 3-PART FRACTURE OF PROXIMAL HUMERUS, LEFT, INITIAL ENCOUNTER: ICD-10-CM

## 2025-05-05 DIAGNOSIS — S42.292A CLOSED 3-PART FRACTURE OF PROXIMAL HUMERUS, LEFT, INITIAL ENCOUNTER: Primary | ICD-10-CM

## 2025-05-05 PROCEDURE — G8419 CALC BMI OUT NRM PARAM NOF/U: HCPCS | Performed by: PHYSICIAN ASSISTANT

## 2025-05-05 PROCEDURE — 99204 OFFICE O/P NEW MOD 45 MIN: CPT | Performed by: PHYSICIAN ASSISTANT

## 2025-05-05 PROCEDURE — 4004F PT TOBACCO SCREEN RCVD TLK: CPT | Performed by: PHYSICIAN ASSISTANT

## 2025-05-05 PROCEDURE — G8427 DOCREV CUR MEDS BY ELIG CLIN: HCPCS | Performed by: PHYSICIAN ASSISTANT

## 2025-05-05 RX ORDER — DICLOFENAC SODIUM AND MISOPROSTOL 75; 200 MG/1; UG/1
1 TABLET, DELAYED RELEASE ORAL 2 TIMES DAILY
COMMUNITY

## 2025-05-05 RX ORDER — BACLOFEN 10 MG/1
TABLET ORAL
COMMUNITY
Start: 2025-04-29

## 2025-05-05 RX ORDER — LISDEXAMFETAMINE DIMESYLATE 70 MG/1
CAPSULE ORAL
COMMUNITY
Start: 2024-07-02

## 2025-05-05 RX ORDER — FLUTICASONE PROPIONATE 50 MCG
SPRAY, SUSPENSION (ML) NASAL
COMMUNITY
Start: 2025-03-03

## 2025-05-05 RX ORDER — ACETAMINOPHEN 500 MG
1000 TABLET ORAL 3 TIMES DAILY
COMMUNITY

## 2025-05-05 RX ORDER — OXYCODONE AND ACETAMINOPHEN 5; 325 MG/1; MG/1
1 TABLET ORAL EVERY 8 HOURS PRN
COMMUNITY
Start: 2025-04-29
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
## 2025-05-06 RX ORDER — OXYCODONE AND ACETAMINOPHEN 10; 325 MG/1; MG/1
1 TABLET ORAL EVERY 6 HOURS PRN
Qty: 28 TABLET | Refills: 0 | Status: SHIPPED | OUTPATIENT
Start: 2025-05-06 | End: 2025-05-13

## 2025-06-19 ENCOUNTER — TRANSCRIBE ORDERS (OUTPATIENT)
Dept: PHYSICAL THERAPY | Facility: HOSPITAL | Age: 40
End: 2025-06-19
Payer: MEDICARE

## 2025-06-19 DIAGNOSIS — S42.209A UNSPECIFIED FRACTURE OF UPPER END OF UNSPECIFIED HUMERUS, INITIAL ENCOUNTER FOR CLOSED FRACTURE: Primary | ICD-10-CM

## 2025-07-02 ENCOUNTER — HOSPITAL ENCOUNTER (OUTPATIENT)
Dept: PHYSICAL THERAPY | Facility: HOSPITAL | Age: 40
Setting detail: THERAPIES SERIES
Discharge: HOME | End: 2025-07-02
Payer: MEDICARE

## 2025-07-02 DIAGNOSIS — G89.29 CHRONIC LEFT SHOULDER PAIN: Primary | ICD-10-CM

## 2025-07-02 DIAGNOSIS — S42.292G HUMERAL HEAD FRACTURE, LEFT, WITH DELAYED HEALING, SUBSEQUENT ENCOUNTER: ICD-10-CM

## 2025-07-02 DIAGNOSIS — M25.512 CHRONIC LEFT SHOULDER PAIN: Primary | ICD-10-CM

## 2025-07-02 DIAGNOSIS — S46.012S TRAUMATIC TEAR OF LEFT ROTATOR CUFF, UNSPECIFIED TEAR EXTENT, SEQUELA: ICD-10-CM

## 2025-07-02 PROCEDURE — 97140 MANUAL THERAPY 1/> REGIONS: CPT

## 2025-07-02 PROCEDURE — 97535 SELF CARE MNGMENT TRAINING: CPT

## 2025-07-02 PROCEDURE — 97162 PT EVAL MOD COMPLEX 30 MIN: CPT
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
## 2025-07-10 ENCOUNTER — APPOINTMENT (OUTPATIENT)
Dept: PHYSICAL THERAPY | Facility: HOSPITAL | Age: 40
End: 2025-07-10
Payer: MEDICARE

## 2025-07-11 ENCOUNTER — HOSPITAL ENCOUNTER (OUTPATIENT)
Dept: PHYSICAL THERAPY | Facility: HOSPITAL | Age: 40
Setting detail: THERAPIES SERIES
Discharge: HOME | End: 2025-07-11
Payer: MEDICARE

## 2025-07-11 DIAGNOSIS — M25.512 CHRONIC LEFT SHOULDER PAIN: Primary | ICD-10-CM

## 2025-07-11 DIAGNOSIS — S46.012S TRAUMATIC TEAR OF LEFT ROTATOR CUFF, UNSPECIFIED TEAR EXTENT, SEQUELA: ICD-10-CM

## 2025-07-11 DIAGNOSIS — G89.29 CHRONIC LEFT SHOULDER PAIN: Primary | ICD-10-CM

## 2025-07-11 DIAGNOSIS — S42.292G HUMERAL HEAD FRACTURE, LEFT, WITH DELAYED HEALING, SUBSEQUENT ENCOUNTER: ICD-10-CM

## 2025-07-11 PROCEDURE — 97110 THERAPEUTIC EXERCISES: CPT

## 2025-07-11 PROCEDURE — 97032 APPL MODALITY 1+ESTIM EA 15: CPT

## 2025-07-11 NOTE — THERAPY TREATMENT NOTE
Physical Therapy Treatment Note  [facility]  A [address] [place], [place] [address]    Patient: Nicole AGUILAR                                                 Visit Date: 2025  :     1985    Referring practitioner:    Jonathan Dyer II, MD  Date of Initial Visit:          Type: THERAPY  Episode: L Shoulder pain following fx, RTC tear  Number of visits this episode: 2    Visit Diagnoses:    ICD-10-CM ICD-9-CM   1. Chronic left shoulder pain  M25.512 719.41    G89.29 338.29   2. Humeral head fracture, left, with delayed healing, subsequent encounter  S42.292G V54.11   3. Traumatic tear of left rotator cuff, unspecified tear extent, sequela  S46.012S 905.7     E989     SUBJECTIVE     Subjective:She reports the tape helped with the pain       PAIN: 5/10    PT G-Codes  Outcome Measure Options: Quick DASH  Quick DASH Score: 86.36    OBJECTIVE     Objective     Modalities Comments   Cold laser/Estim Combo treatment using CIM setting  to L GHJ       Tx to Date: 2   Minutes 10     Therapeutic Exercises    25670 Units Comments   Obtained an initial Quick Dash     Gentle slow rhythmic PROM L shoulder flexion, abduction, abduction in scaption                     Timed Minutes 28        Therapy Education/Self Care 85176   Education offered today    Medhugoe Code    Ongoing HEP   Gentle elbow stretching gravity assisted    Timed Minutes        Total Timed Treatment:     38   mins  Total Time of Visit:             38   mins         ASSESSMENT/PLAN     GOALS  Goals                                                    Progress Note due by 25                                                                Recert due by 25   STG by: 4 weeks Comments Date Status   L elbow extension to at least -5 deg         L elbow flexion at least 145 deg      0.   Obtain dx imaging to determine extent of soft tissue/muscular involvement in injury                              LTG by: 8 weeks         Able to tolerate at least 30 minutes out of sling without subluxation of L GHJ          Able to use LUE to assist in homemaking tasks such as washing dishes and doing laundry without pain >3/10          Able to use LUE in self care ADLs without increased pain >3/10          Able to weightbear through LUE for improved participation in sexual and recreational activities          Improve QuickDASH score by at least 15 pts   86.36  7/11  Ongoing     Anticipated CPT codes: Therapeutic Exercise 30001, Manual Therapy 04971, Therapeutic Activity 54894, Neuromuscular ReEducation 29445, Self Care/Home Management 13085, Estim Attended 62469, and Estim Unattended 41879      Assessment/Plan     ASSESSMENT:   We had to progress the PROM very slowly today and she had multiple micro spasms throughout the session.     PLAN:   Continue to promote increased ROM.    SIGNATURE: Juan Ramon Dalton PTA, [place] License #: [id number]  Electronically Signed on 7/11/2025        [address] [place] [address] [place] [address]  [phone]

## 2025-07-17 ENCOUNTER — HOSPITAL ENCOUNTER (OUTPATIENT)
Dept: PHYSICAL THERAPY | Facility: HOSPITAL | Age: 40
Setting detail: THERAPIES SERIES
Discharge: HOME | End: 2025-07-17
Payer: MEDICARE

## 2025-07-17 DIAGNOSIS — S42.292G HUMERAL HEAD FRACTURE, LEFT, WITH DELAYED HEALING, SUBSEQUENT ENCOUNTER: ICD-10-CM

## 2025-07-17 DIAGNOSIS — M25.512 CHRONIC LEFT SHOULDER PAIN: Primary | ICD-10-CM

## 2025-07-17 DIAGNOSIS — G89.29 CHRONIC LEFT SHOULDER PAIN: Primary | ICD-10-CM

## 2025-07-17 DIAGNOSIS — S46.012S TRAUMATIC TEAR OF LEFT ROTATOR CUFF, UNSPECIFIED TEAR EXTENT, SEQUELA: ICD-10-CM

## 2025-07-17 PROCEDURE — 97140 MANUAL THERAPY 1/> REGIONS: CPT

## 2025-07-17 NOTE — THERAPY TREATMENT NOTE
Physical Therapy Treatment Note  [facility]  A department [address], [place] [address]    Patient: Nicole AGUILAR                                                 Visit Date: 2025  :     1985    Referring practitioner:    Jonathan Dyer II, MD  Date of Initial Visit:          Type: THERAPY  Episode: L Shoulder pain following fx, RTC tear  Number of visits this episode: 3    Visit Diagnoses:    ICD-10-CM ICD-9-CM   1. Chronic left shoulder pain  M25.512 719.41    G89.29 338.29   2. Humeral head fracture, left, with delayed healing, subsequent encounter  S42.292G V54.11   3. Traumatic tear of left rotator cuff, unspecified tear extent, sequela  S46.012S 905.7     E989     SUBJECTIVE     Subjective: Notes she is making it, notes she has fallen since she broke her arm.       PAIN: 5/10         OBJECTIVE     Objective       Manual Therapy     08081  Comments   PROM L elbow ext/flex Decreased ext w/ forearm in neutral, increased supinated    PROM sh flex/abd w/ elbow @  deg  Increased pain        Attempted gentle STM  Very tender and intensifies pain       Timed Minutes 44            Therapy Education/Self Care 89543   Education offered today    Medbride Code    Ongoing HEP   Gentle elbow stretching gravity assisted    Timed Minutes        Total Timed Treatment:     44   mins  Total Time of Visit:             44   mins         ASSESSMENT/PLAN     GOALS  Goals                                                    Progress Note due by 25                                                                Recert due by 25   STG by: 4 weeks Comments Date Status   L elbow extension to at least -5 deg         L elbow flexion at least 145 deg      0.   Obtain dx imaging to determine extent of soft tissue/muscular involvement in injury                             LTG by: 8 weeks         Able to tolerate at least 30 minutes out of sling without  subluxation of L GHJ          Able to use LUE to assist in homemaking tasks such as washing dishes and doing laundry without pain >3/10          Able to use LUE in self care ADLs without increased pain >3/10          Able to weightbear through LUE for improved participation in sexual and recreational activities          Improve QuickDASH score by at least 15 pts   86.36  7/11  Ongoing     Anticipated CPT codes: Therapeutic Exercise 55865, Manual Therapy 75101, Therapeutic Activity 10716, Neuromuscular ReEducation 19602, Self Care/Home Management 36109, Estim Attended 75884, and Estim Unattended 43058      Assessment/Plan     ASSESSMENT:   Pt exhibits cont pain in the L shoulder with decreased overall ROM. Pt able to obtain near 0 deg L elbow ext w/ pain while forearm in full supination, but decreased while in neutral. Pt also able to perform full elbow flex while supinated, but unable while in neutral. Pt still very guarded and MRI is necessary to see extend of injury to surrounding musculature.     PLAN:   Continue to promote increased ROM.    SIGNATURE: Elier Oliver PTA, [place] License #: [id number]  Electronically Signed on 7/17/2025        [address] [place] [address] [place] [address]  [phone]

## 2025-07-24 ENCOUNTER — HOSPITAL ENCOUNTER (OUTPATIENT)
Dept: PHYSICAL THERAPY | Facility: HOSPITAL | Age: 40
Setting detail: THERAPIES SERIES
End: 2025-07-24
Payer: MEDICARE

## 2025-07-24 DIAGNOSIS — S46.012S TRAUMATIC TEAR OF LEFT ROTATOR CUFF, UNSPECIFIED TEAR EXTENT, SEQUELA: ICD-10-CM

## 2025-07-24 DIAGNOSIS — S42.292G HUMERAL HEAD FRACTURE, LEFT, WITH DELAYED HEALING, SUBSEQUENT ENCOUNTER: ICD-10-CM

## 2025-07-24 DIAGNOSIS — G89.29 CHRONIC LEFT SHOULDER PAIN: Primary | ICD-10-CM

## 2025-07-24 DIAGNOSIS — M25.512 CHRONIC LEFT SHOULDER PAIN: Primary | ICD-10-CM

## 2025-07-25 ENCOUNTER — HOSPITAL ENCOUNTER (OUTPATIENT)
Dept: PHYSICAL THERAPY | Facility: HOSPITAL | Age: 40
Setting detail: THERAPIES SERIES
Discharge: HOME | End: 2025-07-25
Payer: MEDICARE

## 2025-07-25 DIAGNOSIS — M25.512 CHRONIC LEFT SHOULDER PAIN: Primary | ICD-10-CM

## 2025-07-25 DIAGNOSIS — S42.292G HUMERAL HEAD FRACTURE, LEFT, WITH DELAYED HEALING, SUBSEQUENT ENCOUNTER: ICD-10-CM

## 2025-07-25 DIAGNOSIS — S46.012S TRAUMATIC TEAR OF LEFT ROTATOR CUFF, UNSPECIFIED TEAR EXTENT, SEQUELA: ICD-10-CM

## 2025-07-25 DIAGNOSIS — G89.29 CHRONIC LEFT SHOULDER PAIN: Primary | ICD-10-CM

## 2025-07-25 PROCEDURE — 97140 MANUAL THERAPY 1/> REGIONS: CPT

## 2025-07-25 PROCEDURE — 97535 SELF CARE MNGMENT TRAINING: CPT

## 2025-07-25 NOTE — THERAPY TREATMENT NOTE
Physical Therapy Treatment Note  [facility]  A department [address], [place] [address]    Patient: Nicole AGUILAR                                                 Visit Date: 2025  :     1985    Referring practitioner:    Jonathan Dyer II, MD  Date of Initial Visit:          Type: THERAPY  Episode: L Shoulder pain following fx, RTC tear  Number of visits this episode: 4    Visit Diagnoses:    ICD-10-CM ICD-9-CM   1. Chronic left shoulder pain  M25.512 719.41    G89.29 338.29   2. Humeral head fracture, left, with delayed healing, subsequent encounter  S42.292G V54.11   3. Traumatic tear of left rotator cuff, unspecified tear extent, sequela  S46.012S 905.7     E989     SUBJECTIVE     Subjective: States she is feeling rough today, noting her bicep is very tenden to the touch and her R arm is starting to hurt as well.        PAIN: 6/10     OBJECTIVE     Objective     Manual Therapy     91491  Comments   Gentle forearm pronation/supination    Wrist flex/EXT stretches    Gentle elbow PROM    Attempted gentle STM to L UT/LS/post RC/bicep/forearm globally Very tender around bicep/anterior shoulder       Timed Minutes 22          Therapy Education/Self Care 82463   Education offered today Talked about what could happen if the bone does not heal correctly and how the anatomy could be affected, talked about what the doctor told her he wanted to do, asked about modalities she could do at home, looked over her old x-rays   Medbride Code    Ongoing HEP   Gentle elbow stretching gravity assisted    Timed Minutes 38       Total Timed Treatment:     60   mins  Total Time of Visit:             60   mins         ASSESSMENT/PLAN     GOALS  Goals                                                    Progress Note due by 25                                                                Recert due by 25   STG by: 4 weeks Comments Date Status   L elbow  extension to at least -5 deg  -8 deg w/ pain 7/25 ongoing   L elbow flexion at least 145 deg         Obtain dx imaging to determine extent of soft tissue/muscular involvement in injury                             LTG by: 8 weeks         Able to tolerate at least 30 minutes out of sling without subluxation of L GHJ          Able to use LUE to assist in homemaking tasks such as washing dishes and doing laundry without pain >3/10          Able to use LUE in self care ADLs without increased pain >3/10          Able to weightbear through LUE for improved participation in sexual and recreational activities          Improve QuickDASH score by at least 15 pts   86.36  7/11  Ongoing     Anticipated CPT codes: Therapeutic Exercise 21958, Manual Therapy 98660, Therapeutic Activity 55263, Neuromuscular ReEducation 77513, Self Care/Home Management 43866, Estim Attended 71596, and Estim Unattended 48077      Assessment/Plan     ASSESSMENT:   Pt reported cont increased L shoulder pain, noting her bicep has been very tender the past few days and it hurts to stretch it out. She also noted that her RUE is starting to hurt since having to take over all of the daily activities she has to do. She presented with cont tightness in her LUE/shoulder/Cx regions, along with some TP activity in her UT and bicep. When relaxing her LUE, there was one instance where I felt her humeral head grind against her glenoid fossa which caused significant pain.     PLAN:   Continue to promote increased ROM.    SIGNATURE: Marlon Lee PTA, [place] License #: [id number]  Electronically Signed on 7/25/2025        [address] [place] [address]. [address]  [phone]

## 2025-07-31 ENCOUNTER — APPOINTMENT (OUTPATIENT)
Dept: PHYSICAL THERAPY | Facility: HOSPITAL | Age: 40
End: 2025-07-31
Payer: MEDICARE

## 2025-08-07 ENCOUNTER — HOSPITAL ENCOUNTER (OUTPATIENT)
Dept: PHYSICAL THERAPY | Facility: HOSPITAL | Age: 40
Setting detail: THERAPIES SERIES
Discharge: HOME | End: 2025-08-07
Payer: MEDICARE

## 2025-08-07 DIAGNOSIS — M25.512 CHRONIC LEFT SHOULDER PAIN: Primary | ICD-10-CM

## 2025-08-07 DIAGNOSIS — G89.29 CHRONIC LEFT SHOULDER PAIN: Primary | ICD-10-CM

## 2025-08-07 DIAGNOSIS — S46.012S TRAUMATIC TEAR OF LEFT ROTATOR CUFF, UNSPECIFIED TEAR EXTENT, SEQUELA: ICD-10-CM

## 2025-08-07 DIAGNOSIS — S42.292G HUMERAL HEAD FRACTURE, LEFT, WITH DELAYED HEALING, SUBSEQUENT ENCOUNTER: ICD-10-CM

## 2025-08-07 PROCEDURE — 97535 SELF CARE MNGMENT TRAINING: CPT | Performed by: PHYSICAL THERAPIST

## 2025-08-07 PROCEDURE — 97530 THERAPEUTIC ACTIVITIES: CPT | Performed by: PHYSICAL THERAPIST

## 2025-08-14 ENCOUNTER — HOSPITAL ENCOUNTER (OUTPATIENT)
Dept: PHYSICAL THERAPY | Facility: HOSPITAL | Age: 40
Setting detail: THERAPIES SERIES
Discharge: HOME | End: 2025-08-14
Payer: MEDICARE

## 2025-08-14 DIAGNOSIS — G89.29 CHRONIC LEFT SHOULDER PAIN: Primary | ICD-10-CM

## 2025-08-14 DIAGNOSIS — S46.012S TRAUMATIC TEAR OF LEFT ROTATOR CUFF, UNSPECIFIED TEAR EXTENT, SEQUELA: ICD-10-CM

## 2025-08-14 DIAGNOSIS — M25.512 CHRONIC LEFT SHOULDER PAIN: Primary | ICD-10-CM

## 2025-08-14 DIAGNOSIS — S42.292G HUMERAL HEAD FRACTURE, LEFT, WITH DELAYED HEALING, SUBSEQUENT ENCOUNTER: ICD-10-CM

## 2025-08-14 PROCEDURE — 97535 SELF CARE MNGMENT TRAINING: CPT

## (undated) DEVICE — Device

## (undated) DEVICE — ANTIBACTERIAL UNDYED BRAIDED (POLYGLACTIN 910), SYNTHETIC ABSORBABLE SUTURE: Brand: COATED VICRYL

## (undated) DEVICE — GOWN,PREVENTION PLUS,L,ST,24/CS: Brand: MEDLINE

## (undated) DEVICE — OLIVE WIRE, SMOOTH, 1.4MM
Type: IMPLANTABLE DEVICE | Site: FOOT | Status: NON-FUNCTIONAL
Brand: BABY GORILLA/GORILLA PLATING SYSTEM
Removed: 2022-09-21

## (undated) DEVICE — SUTURE VCRL SZ 3-0 L18IN ABSRB UD L26MM SH 1/2 CIR J864D

## (undated) DEVICE — BONE MARROW HARVEST NEEDLE: Brand: BABY GORILLA/GORILLA PLATING SYSTEM

## (undated) DEVICE — CATH IV ANGIO FEP 12G 3IN LTBLU 10PK

## (undated) DEVICE — PK SPINE POST 30

## (undated) DEVICE — DRSNG WND GZ CURAD OIL EMULSION 3X3IN STRL

## (undated) DEVICE — INSTRUMENT 8670005 350 MM GUIDEWRE SHARP

## (undated) DEVICE — K-WIRE, SINGLE ENDED TROCAR TIP, SMOOTH, 1.2 X 150MM
Type: IMPLANTABLE DEVICE | Site: FOOT | Status: NON-FUNCTIONAL
Brand: MONSTER SCREW SYSTEM
Removed: 2022-09-21

## (undated) DEVICE — SYR LUERLOK 20CC BX/50

## (undated) DEVICE — TRAP FLD MINIVAC MEGADYNE 100ML

## (undated) DEVICE — DISPOSABLE TOURNIQUET CUFF SINGLE BLADDER, SINGLE PORT AND QUICK CONNECT CONNECTOR: Brand: COLOR CUFF

## (undated) DEVICE — E-Z CLEAN, NON-STICK, PTFE COATED, ELECTROSURGICAL BLADE ELECTRODE, BAYONET, MODIFIED EXTENDED INSULATION, 6.5 INCH (16.5 CM): Brand: MEGADYNE

## (undated) DEVICE — GLV SURG BIOGEL LTX PF 6 1/2

## (undated) DEVICE — MASTISOL ADHESIVE LIQ 2/3ML

## (undated) DEVICE — PK TURNOVER RM ADV

## (undated) DEVICE — SPNG GZ WOVN 4X4IN 12PLY 10/BX STRL

## (undated) DEVICE — SUTURE VCRL SZ 2-0 L27IN ABSRB VLT L26MM UR-6 5/8 CIR J602H

## (undated) DEVICE — TOWEL,OR,DSP,ST,BLUE,DLX,4/PK,20PK/CS: Brand: MEDLINE

## (undated) DEVICE — MICRO KOVER: Brand: UNBRANDED

## (undated) DEVICE — GLV SURG GRN DERMASSURE LF PF 7.5

## (undated) DEVICE — YANKAUER SUCTION INSTRUMENT WITHOUT CONTROL VENT, OPEN TIP, CLEAR: Brand: YANKAUER

## (undated) DEVICE — DRESSING TRNSPAR W5XL4.5IN FLM SHT SEMIPERMEABLE WIND

## (undated) DEVICE — PAD UNDERCAST WYTEX 6IN 4YD LF STRL

## (undated) DEVICE — E-Z CLEAN, NON-STICK, PTFE COATED, ELECTROSURGICAL BLADE ELECTRODE, MODIFIED EXTENDED INSULATION, 2.5 INCH (6.35 CM): Brand: MEGADYNE

## (undated) DEVICE — C-ARMOR C-ARM EQUIPMENT COVERS CLEAR STERILE UNIVERSAL FIT 12 PER CASE: Brand: C-ARMOR

## (undated) DEVICE — 3.0MM PRECISION NEURO (MATCH HEAD)

## (undated) DEVICE — BANDAGE,GAUZE,BULKEE II,4.5"X4.1YD,STRL: Brand: MEDLINE

## (undated) DEVICE — NEEDLE SPNL 22GA L3.5IN BLK HUB S STL REG WALL FIT STYL W/

## (undated) DEVICE — DRILL, 2.0 X 110MM, SOLID, MEASURING, AO: Brand: BABY GORILLA®/GORILLA® PLATING SYSTEM

## (undated) DEVICE — PK EXTRM 30

## (undated) DEVICE — 4-PORT MANIFOLD: Brand: NEPTUNE 2

## (undated) DEVICE — DRSNG SURESITE WNDW 4X4.5

## (undated) DEVICE — UNDERGLOVE SURG SZ 8 FNGR THK0.21MIL GRN LTX BEAD CUF

## (undated) DEVICE — TIBURON LAPAROTOMY DRAPE: Brand: CONVERTORS

## (undated) DEVICE — ELECTRD BLD EZ CLN MOD XLNG 2.75IN

## (undated) DEVICE — SYRINGE, LOCKING, 10CC, STERILE: Brand: BABY GORILLA/GORILLA PLATING SYSTEM

## (undated) DEVICE — BAPTIST TURNOVER KIT: Brand: MEDLINE INDUSTRIES, INC.

## (undated) DEVICE — DRAPE,UTILITY,TAPE,15X26,STERILE: Brand: MEDLINE

## (undated) DEVICE — MORSELIZING BONE GRAFT HARVESTER, AO, 10MM: Brand: BABY GORILLA®/GORILLA® PLATING SYSTEM

## (undated) DEVICE — GLV SURG SENSICARE W/ALOE PF LF 7.5 STRL

## (undated) DEVICE — PAD UNDERCAST WYTEX 4IN 4YD LF STRL

## (undated) DEVICE — NEURO CDS

## (undated) DEVICE — CONN FLX BREATHE CIRCT

## (undated) DEVICE — GLV SURG DERMASSURE GRN LF PF 8.0

## (undated) DEVICE — NDL HYPO PRECISIONGLIDE REG 25G 1 1/2

## (undated) DEVICE — PRECISION THIN (9.0 X 0.38 X 25.0MM)

## (undated) DEVICE — KIT POS PT CARE KT W/ GENTLE TCH WILSON +

## (undated) DEVICE — 3M™ STERI-STRIP™ REINFORCED ADHESIVE SKIN CLOSURES, R1547, 1/2 IN X 4 IN (12 MM X 100 MM), 6 STRIPS/ENVELOPE: Brand: 3M™ STERI-STRIP™

## (undated) DEVICE — TOOL T12MH25L LGD 12CM 2.5MM MATCH LG: Brand: MIDAS REX®

## (undated) DEVICE — BNDG ELAS W/CLIP 6IN 10YD LF STRL

## (undated) DEVICE — BONE FENESTRATION PERFORATOR: Brand: BABY GORILLA®/GORILLA® PLATING SYSTEM

## (undated) DEVICE — 4.0MM EGG BUR

## (undated) DEVICE — SPK10277 JACKSON/PRO-AXIS KIT: Brand: SPK10277 JACKSON/PRO-AXIS KIT

## (undated) DEVICE — KT DRP MINIVIEW STRL

## (undated) DEVICE — APPL CHLORAPREP HI/LITE 26ML ORNG

## (undated) DEVICE — GLV SURG SENSICARE PI ORTHO SZ7.5 LF STRL

## (undated) DEVICE — BNDG ELAS CO-FLEX SLF ADHR 4IN5YD LF STRL

## (undated) DEVICE — GLV SURG BIOGEL LTX PF 7 1/2

## (undated) DEVICE — BAG BND W36XL36IN TRNSPAR POLY GEN PURP W E BND CLSR TIDI

## (undated) DEVICE — PAD,ABDOMINAL,8"X10",ST,LF: Brand: MEDLINE

## (undated) DEVICE — DRSNG WND GZ CURAD OIL EMULSION 3X8IN STRL PK/3

## (undated) DEVICE — INTENDED FOR TISSUE SEPARATION, AND OTHER PROCEDURES THAT REQUIRE A SHARP SURGICAL BLADE TO PUNCTURE OR CUT.: Brand: BARD-PARKER ® STAINLESS STEEL BLADES

## (undated) DEVICE — HDRST INTUB GENTLETOUCH SLOT 7IN RT